# Patient Record
Sex: FEMALE | Race: WHITE | NOT HISPANIC OR LATINO | Employment: OTHER | URBAN - METROPOLITAN AREA
[De-identification: names, ages, dates, MRNs, and addresses within clinical notes are randomized per-mention and may not be internally consistent; named-entity substitution may affect disease eponyms.]

---

## 2021-02-13 DIAGNOSIS — Z23 ENCOUNTER FOR IMMUNIZATION: ICD-10-CM

## 2021-08-05 RX ORDER — ASCORBIC ACID 500 MG
500 TABLET ORAL
COMMUNITY

## 2021-08-05 RX ORDER — LANOLIN ALCOHOL/MO/W.PET/CERES
2 CREAM (GRAM) TOPICAL
COMMUNITY

## 2021-08-05 RX ORDER — METHYLDOPA/HYDROCHLOROTHIAZIDE 250MG-25MG
TABLET ORAL
COMMUNITY

## 2021-08-05 RX ORDER — OMEGA-3S/DHA/EPA/FISH OIL/D3 300MG-1000
400 CAPSULE ORAL
COMMUNITY

## 2021-08-06 ENCOUNTER — OFFICE VISIT (OUTPATIENT)
Dept: FAMILY MEDICINE CLINIC | Facility: CLINIC | Age: 66
End: 2021-08-06
Payer: COMMERCIAL

## 2021-08-06 VITALS
HEART RATE: 83 BPM | DIASTOLIC BLOOD PRESSURE: 82 MMHG | OXYGEN SATURATION: 98 % | RESPIRATION RATE: 16 BRPM | SYSTOLIC BLOOD PRESSURE: 136 MMHG | HEIGHT: 60 IN | WEIGHT: 148.4 LBS | BODY MASS INDEX: 29.13 KG/M2

## 2021-08-06 DIAGNOSIS — M25.511 CHRONIC RIGHT SHOULDER PAIN: ICD-10-CM

## 2021-08-06 DIAGNOSIS — L30.9 ECZEMA, UNSPECIFIED TYPE: ICD-10-CM

## 2021-08-06 DIAGNOSIS — M15.9 PRIMARY OSTEOARTHRITIS INVOLVING MULTIPLE JOINTS: ICD-10-CM

## 2021-08-06 DIAGNOSIS — I10 ESSENTIAL HYPERTENSION: Primary | ICD-10-CM

## 2021-08-06 DIAGNOSIS — E78.2 MIXED HYPERLIPIDEMIA: ICD-10-CM

## 2021-08-06 DIAGNOSIS — G89.29 CHRONIC RIGHT SHOULDER PAIN: ICD-10-CM

## 2021-08-06 DIAGNOSIS — Z79.1 NSAID LONG-TERM USE: ICD-10-CM

## 2021-08-06 DIAGNOSIS — Z12.31 VISIT FOR SCREENING MAMMOGRAM: ICD-10-CM

## 2021-08-06 PROBLEM — M15.0 PRIMARY OSTEOARTHRITIS INVOLVING MULTIPLE JOINTS: Status: ACTIVE | Noted: 2021-08-06

## 2021-08-06 PROCEDURE — 1036F TOBACCO NON-USER: CPT | Performed by: FAMILY MEDICINE

## 2021-08-06 PROCEDURE — 3288F FALL RISK ASSESSMENT DOCD: CPT | Performed by: FAMILY MEDICINE

## 2021-08-06 PROCEDURE — 1160F RVW MEDS BY RX/DR IN RCRD: CPT | Performed by: FAMILY MEDICINE

## 2021-08-06 PROCEDURE — 3008F BODY MASS INDEX DOCD: CPT | Performed by: FAMILY MEDICINE

## 2021-08-06 PROCEDURE — 1101F PT FALLS ASSESS-DOCD LE1/YR: CPT | Performed by: FAMILY MEDICINE

## 2021-08-06 PROCEDURE — 99204 OFFICE O/P NEW MOD 45 MIN: CPT | Performed by: FAMILY MEDICINE

## 2021-08-06 PROCEDURE — 3725F SCREEN DEPRESSION PERFORMED: CPT | Performed by: FAMILY MEDICINE

## 2021-08-06 RX ORDER — VALSARTAN 160 MG/1
160 TABLET ORAL DAILY
COMMUNITY
Start: 2021-07-23 | End: 2021-08-06 | Stop reason: SDUPTHER

## 2021-08-06 RX ORDER — CLOBETASOL PROPIONATE 0.5 MG/G
OINTMENT TOPICAL 2 TIMES DAILY
Qty: 45 G | Refills: 0 | Status: SHIPPED | OUTPATIENT
Start: 2021-08-06

## 2021-08-06 RX ORDER — MELOXICAM 15 MG/1
15 TABLET ORAL DAILY
COMMUNITY
Start: 2021-07-01 | End: 2022-01-14 | Stop reason: SDUPTHER

## 2021-08-06 RX ORDER — VALSARTAN 160 MG/1
160 TABLET ORAL DAILY
Qty: 90 TABLET | Refills: 0 | Status: SHIPPED | OUTPATIENT
Start: 2021-08-06 | End: 2021-10-19 | Stop reason: SDUPTHER

## 2021-08-06 RX ORDER — ATORVASTATIN CALCIUM 10 MG/1
10 TABLET, FILM COATED ORAL DAILY
Qty: 90 TABLET | Refills: 0 | Status: SHIPPED | OUTPATIENT
Start: 2021-08-06 | End: 2021-10-19 | Stop reason: SDUPTHER

## 2021-08-06 RX ORDER — HYDROCHLOROTHIAZIDE 25 MG/1
25 TABLET ORAL DAILY
COMMUNITY
Start: 2021-07-01 | End: 2021-10-19 | Stop reason: SDUPTHER

## 2021-08-06 RX ORDER — ATORVASTATIN CALCIUM 10 MG/1
10 TABLET, FILM COATED ORAL DAILY
COMMUNITY
Start: 2021-07-14 | End: 2021-08-06 | Stop reason: SDUPTHER

## 2021-08-06 NOTE — ASSESSMENT & PLAN NOTE
Patient's blood pressure is at goal   Continue hydrochlorothiazide 25 milligram, valsartan 160 milligram

## 2021-08-06 NOTE — PROGRESS NOTES
Subjective:      Patient ID: Maurice Mckeon is a 74 y o  female  HPI  Patient is here to establish care , moved to area  Patient has history of hypertension  Blood pressure is at goal   She is currently on hydrochlorothiazide 25, valsartan 160 milligram   Takes atorvastatin 10 mg for hyperlipidemia  Due for metabolic labs  Patient is also reporting joint pain in multiple joints  Recently has worsening right shoulder pain  Has been on meloxicam for few years now  Patient takes the medication once daily, usually you it helps with her joint pain  Is reporting skin changes on tips of left thumb, index finger  She has been using OTC hydrocortisone, which helps mildly  Denies exposure to any new allergens  Past Medical History:   Diagnosis Date    Allergic     Hive reaction to Macrodantin,  - reaction to shrimp    Arthritis ? take Meloxicam, Glucosamine & Chondroitin    Hypertension ? take hydrochlorothiazide, Valsartan & Atorvastatin    Urinary tract infection 1975    Occasional bladder infections throughout adulthood       Family History   Problem Relation Age of Onset    Depression Mother         Bi-polar    Cancer Mother         Non-small cell lung cancer (non-smoker)    Bipolar disorder Mother     Dementia Maternal Grandmother     Dementia Maternal Uncle     Hypertension Father     Heart disease Father     Hearing loss Father     Hypertension Brother     Heart disease Maternal Grandfather     Diabetes Maternal Grandfather     Completed Suicide  Maternal Uncle        Past Surgical History:   Procedure Laterality Date     SECTION  1980    EYE SURGERY  2020    cataract surgery R eye    HYSTERECTOMY  2000        reports that she has never smoked  She has never used smokeless tobacco  She reports current alcohol use of about 1 0 standard drinks of alcohol per week  She reports that she does not use drugs        Current Outpatient Medications:    ascorbic acid (VITAMIN C) 500 MG tablet, Take 500 mg by mouth, Disp: , Rfl:     atorvastatin (LIPITOR) 10 mg tablet, Take 1 tablet (10 mg total) by mouth daily, Disp: 90 tablet, Rfl: 0    cholecalciferol (VITAMIN D3) 400 units tablet, Take 400 Units by mouth, Disp: , Rfl:     Echinacea 125 MG CAPS, Take by mouth, Disp: , Rfl:     glucosamine-chondroitin 500-400 MG tablet, Take 2 tablets by mouth , Disp: , Rfl:     hydrochlorothiazide (HYDRODIURIL) 25 mg tablet, Take 25 mg by mouth daily , Disp: , Rfl:     meloxicam (MOBIC) 15 mg tablet, Take 15 mg by mouth daily , Disp: , Rfl:     Multiple Vitamins-Minerals (MULTIVITAMIN ADULT EXTRA C PO), Take 1 tablet by mouth, Disp: , Rfl:     valsartan (DIOVAN) 160 mg tablet, Take 1 tablet (160 mg total) by mouth daily, Disp: 90 tablet, Rfl: 0    The following portions of the patient's history were reviewed and updated as appropriate: allergies, current medications, past family history, past medical history, past social history, past surgical history and problem list     Review of Systems   Constitutional: Negative  Eyes: Negative  Respiratory: Negative  Negative for shortness of breath  Cardiovascular: Negative  Negative for chest pain and palpitations  Gastrointestinal: Negative  Endocrine: Negative  Genitourinary: Negative  Musculoskeletal: Negative  Negative for myalgias  Neurological: Negative  Negative for headaches  Psychiatric/Behavioral: Negative  Objective:    /82   Pulse 83   Resp 16   Ht 4' 11 65" (1 515 m)   Wt 67 3 kg (148 lb 6 4 oz)   SpO2 98%   BMI 29 33 kg/m²      Physical Exam  Constitutional:       Appearance: She is well-developed  Eyes:      Pupils: Pupils are equal, round, and reactive to light  Cardiovascular:      Rate and Rhythm: Normal rate and regular rhythm  Heart sounds: Normal heart sounds     Pulmonary:      Effort: Pulmonary effort is normal       Breath sounds: Normal breath sounds  Abdominal:      General: Bowel sounds are normal       Palpations: Abdomen is soft  Musculoskeletal:         General: Normal range of motion  Cervical back: Normal range of motion  Neurological:      Mental Status: She is alert and oriented to person, place, and time  Psychiatric:         Behavior: Behavior normal          Judgment: Judgment normal            No results found for this or any previous visit (from the past 1008 hour(s))  Assessment/Plan:    No problem-specific Assessment & Plan notes found for this encounter  Problem List Items Addressed This Visit        Cardiovascular and Mediastinum    Essential hypertension - Primary     Patient's blood pressure is at goal   Continue hydrochlorothiazide 25 milligram, valsartan 160 milligram         Relevant Medications    hydrochlorothiazide (HYDRODIURIL) 25 mg tablet    valsartan (DIOVAN) 160 mg tablet    Other Relevant Orders    Comprehensive metabolic panel    Microalbumin / creatinine urine ratio       Musculoskeletal and Integument    Primary osteoarthritis involving multiple joints    Eczema     Trial of topical steroid  Fup if symptoms persist or worsen         Relevant Medications    clobetasol (TEMOVATE) 0 05 % ointment       Other    Mixed hyperlipidemia     Continue atorvastatin 10 milligram   Suggested metabolic labs/ fup   Relevant Medications    atorvastatin (LIPITOR) 10 mg tablet    Other Relevant Orders    Comprehensive metabolic panel    Lipid panel    Visit for screening mammogram    Relevant Orders    Mammo screening bilateral w 3d & cad    Chronic right shoulder pain     Currently on meloxicam  Will fup with Orthopedics  BMI Counseling: Body mass index is 29 33 kg/m²  The BMI is above normal  Nutrition recommendations include reducing portion sizes, decreasing overall calorie intake, 3-5 servings of fruits/vegetables daily, reducing fast food intake and consuming healthier snacks  Exercise recommendations include strength training exercises  I have spent 45 minutes with Patient  today in which greater than 50% of this time was spent in counseling/coordination of care regarding Diagnostic results, Prognosis, Risks and benefits of tx options, Intructions for management, Patient and family education and Importance of tx compliance

## 2021-08-09 ENCOUNTER — TELEPHONE (OUTPATIENT)
Dept: OBGYN CLINIC | Facility: OTHER | Age: 66
End: 2021-08-09

## 2021-08-09 NOTE — TELEPHONE ENCOUNTER
Patient is being referred to Dr Amanda Waldrop for right shoulder pain  LMOM to call back and schedule appointment

## 2021-08-17 ENCOUNTER — APPOINTMENT (OUTPATIENT)
Dept: LAB | Facility: CLINIC | Age: 66
End: 2021-08-17
Payer: COMMERCIAL

## 2021-08-17 DIAGNOSIS — I10 ESSENTIAL HYPERTENSION: ICD-10-CM

## 2021-08-17 DIAGNOSIS — E78.2 MIXED HYPERLIPIDEMIA: ICD-10-CM

## 2021-08-17 LAB
ALBUMIN SERPL BCP-MCNC: 3.6 G/DL (ref 3.5–5)
ALP SERPL-CCNC: 70 U/L (ref 46–116)
ALT SERPL W P-5'-P-CCNC: 27 U/L (ref 12–78)
ANION GAP SERPL CALCULATED.3IONS-SCNC: 4 MMOL/L (ref 4–13)
AST SERPL W P-5'-P-CCNC: 20 U/L (ref 5–45)
BILIRUB SERPL-MCNC: 0.54 MG/DL (ref 0.2–1)
BUN SERPL-MCNC: 23 MG/DL (ref 5–25)
CALCIUM SERPL-MCNC: 9.2 MG/DL (ref 8.3–10.1)
CHLORIDE SERPL-SCNC: 102 MMOL/L (ref 100–108)
CHOLEST SERPL-MCNC: 213 MG/DL (ref 50–200)
CO2 SERPL-SCNC: 28 MMOL/L (ref 21–32)
CREAT SERPL-MCNC: 0.64 MG/DL (ref 0.6–1.3)
CREAT UR-MCNC: <13 MG/DL
GFR SERPL CREATININE-BSD FRML MDRD: 92 ML/MIN/1.73SQ M
GLUCOSE P FAST SERPL-MCNC: 87 MG/DL (ref 65–99)
HDLC SERPL-MCNC: 64 MG/DL
LDLC SERPL CALC-MCNC: 126 MG/DL (ref 0–100)
MICROALBUMIN UR-MCNC: <5 MG/L (ref 0–20)
NONHDLC SERPL-MCNC: 149 MG/DL
POTASSIUM SERPL-SCNC: 3.9 MMOL/L (ref 3.5–5.3)
PROT SERPL-MCNC: 7.5 G/DL (ref 6.4–8.2)
SODIUM SERPL-SCNC: 134 MMOL/L (ref 136–145)
TRIGL SERPL-MCNC: 117 MG/DL

## 2021-08-17 PROCEDURE — 82570 ASSAY OF URINE CREATININE: CPT | Performed by: FAMILY MEDICINE

## 2021-08-17 PROCEDURE — 82043 UR ALBUMIN QUANTITATIVE: CPT | Performed by: FAMILY MEDICINE

## 2021-08-17 PROCEDURE — 80053 COMPREHEN METABOLIC PANEL: CPT

## 2021-08-17 PROCEDURE — 80061 LIPID PANEL: CPT

## 2021-08-17 PROCEDURE — 36415 COLL VENOUS BLD VENIPUNCTURE: CPT

## 2021-09-02 ENCOUNTER — HOSPITAL ENCOUNTER (OUTPATIENT)
Dept: RADIOLOGY | Age: 66
Discharge: HOME | End: 2021-09-02
Payer: COMMERCIAL

## 2021-09-02 VITALS — BODY MASS INDEX: 29.84 KG/M2 | HEIGHT: 59 IN | WEIGHT: 148 LBS

## 2021-09-02 DIAGNOSIS — Z12.31 VISIT FOR SCREENING MAMMOGRAM: ICD-10-CM

## 2021-09-02 PROCEDURE — 77067 SCR MAMMO BI INCL CAD: CPT

## 2021-09-02 PROCEDURE — 77063 BREAST TOMOSYNTHESIS BI: CPT

## 2021-09-09 ENCOUNTER — APPOINTMENT (OUTPATIENT)
Dept: RADIOLOGY | Facility: AMBULARY SURGERY CENTER | Age: 66
End: 2021-09-09
Attending: ORTHOPAEDIC SURGERY
Payer: COMMERCIAL

## 2021-09-09 ENCOUNTER — OFFICE VISIT (OUTPATIENT)
Dept: OBGYN CLINIC | Facility: CLINIC | Age: 66
End: 2021-09-09
Payer: COMMERCIAL

## 2021-09-09 VITALS
SYSTOLIC BLOOD PRESSURE: 163 MMHG | WEIGHT: 148.6 LBS | HEIGHT: 60 IN | HEART RATE: 69 BPM | DIASTOLIC BLOOD PRESSURE: 92 MMHG | BODY MASS INDEX: 29.17 KG/M2

## 2021-09-09 DIAGNOSIS — M25.511 RIGHT SHOULDER PAIN, UNSPECIFIED CHRONICITY: Primary | ICD-10-CM

## 2021-09-09 DIAGNOSIS — M75.21 BICEPS TENDINITIS OF RIGHT SHOULDER: ICD-10-CM

## 2021-09-09 DIAGNOSIS — M25.511 RIGHT SHOULDER PAIN, UNSPECIFIED CHRONICITY: ICD-10-CM

## 2021-09-09 PROCEDURE — 73030 X-RAY EXAM OF SHOULDER: CPT

## 2021-09-09 PROCEDURE — 3008F BODY MASS INDEX DOCD: CPT | Performed by: ORTHOPAEDIC SURGERY

## 2021-09-09 PROCEDURE — 1036F TOBACCO NON-USER: CPT | Performed by: ORTHOPAEDIC SURGERY

## 2021-09-09 PROCEDURE — 1160F RVW MEDS BY RX/DR IN RCRD: CPT | Performed by: ORTHOPAEDIC SURGERY

## 2021-09-09 PROCEDURE — 99203 OFFICE O/P NEW LOW 30 MIN: CPT | Performed by: ORTHOPAEDIC SURGERY

## 2021-09-09 RX ORDER — LIDOCAINE 50 MG/G
OINTMENT TOPICAL AS NEEDED
Qty: 35.44 G | Refills: 0 | Status: SHIPPED | OUTPATIENT
Start: 2021-09-09 | End: 2021-10-19

## 2021-09-09 NOTE — PROGRESS NOTES
Assessment:       1  Right shoulder pain, unspecified chronicity    2  Biceps tendinitis of right shoulder          Plan:         I explained my current clinical findings and reviewed radiological findings with Karthik Christianson today  At this time I will refer her for a course of physical therapy to help improve the right shoulder range of motion  She may have some intermittent anterior subluxation but her current symptoms are more consistent with glenohumeral arthritis and long head of the biceps tendinitis  I will also prescribe her topical lidocaine ointment for symptomatic relief as needed  Will see her back in about 2 months time for clinical re-evaluation  If symptoms do persist we could consider doing a right glenohumeral cortisone and/or long head of the biceps tendon sheath cortisone injection  Subjective:     Patient ID: Alfonso Cruz is a 74 y o  female  Chief Complaint:  Right shoulder pain    HPI  Karthik Christianson   Is a 55-year-old  Right-hand-dominant lady presents today for evaluation of right shoulder pain  patient reports sustaining a possible right shoulder dislocation when she was [age]years of age for which she did not undergo any surgical intervention  Subsequently she has noticed infrequent and intermittent popping sensation of the right shoulder for the last several decades  She had not had these symptoms for several years until more recently about 1 5 months ago when she felt a popping type sensation of the right shoulder while reaching out from the right upper extremity  Subsequently, she had repeat sensation of similar nature about 2 days subsequent to the initial episode  Describes this as a sharp " twinge" associated with a popping sensation  She denies any pain at rest   Does have difficulty and some sense of apprehension when she tries to do overhead activity or right upper extremity extension  Pain intensity is only mild and intermittent  Denies any associated neck pain  Denies any history of recent trauma to the right shoulder  Social History     Occupational History    Not on file   Tobacco Use    Smoking status: Never Smoker    Smokeless tobacco: Never Used   Substance and Sexual Activity    Alcohol use: Yes     Alcohol/week: 1 0 standard drinks     Types: 1 Glasses of wine per week     Comment: Very limited  Less than 1 per week   Drug use: Never    Sexual activity: Not Currently     Partners: Male     Birth control/protection: Post-menopausal, None      Review of Systems   Constitutional: Negative  HENT: Negative  Eyes: Negative  Respiratory: Negative  Cardiovascular: Negative  Gastrointestinal: Negative  Endocrine: Negative  Genitourinary: Negative  Skin: Negative  Allergic/Immunologic: Negative  Neurological: Negative  Hematological: Negative  Psychiatric/Behavioral: Negative  Objective:     Left Shoulder Exam     Tenderness   Left shoulder tenderness location: Tender to palpation over the long head of biceps tendo  Range of Motion   Active abduction: 100   Passive abduction: 110   Left shoulder external rotation: External rotation in adduction is 70° and in abduction is 70°   Forward flexion: 150   Internal rotation 0 degrees: Lumbar   Internal rotation 90 degrees:  40 abnormal     Muscle Strength   Abduction: 5/5   Internal rotation: 5/5   External rotation: 5/5   Supraspinatus: 5/5   Subscapularis: 5/5   Biceps: 4/5     Tests   Villasenor test: negative  Cross arm: negative  Impingement: negative  Drop arm: negative    Other   Erythema: absent  Sensation: normal  Pulse: present     Comments:    Positive Union's  Positive speed's  Negative Yergason's  Has some mild sense of apprehension anteriorly  Negative acromioclavicular Jobes compression test         Physical Exam  Vitals and nursing note reviewed  Constitutional:       Appearance: She is well-developed  HENT:      Head: Normocephalic and atraumatic  Eyes:      Conjunctiva/sclera: Conjunctivae normal       Pupils: Pupils are equal, round, and reactive to light  Cardiovascular:      Rate and Rhythm: Normal rate and regular rhythm  Pulmonary:      Effort: Pulmonary effort is normal  No respiratory distress  Skin:     General: Skin is warm and dry  Findings: No erythema  Neurological:      Mental Status: She is alert and oriented to person, place, and time  Cranial Nerves: No cranial nerve deficit  Psychiatric:         Behavior: Behavior normal          Thought Content: Thought content normal          Judgment: Judgment normal            I have personally reviewed pertinent films in PACS and my interpretation is Plain radiograph of the right shoulder done today does not reveal any acute osseous injury  Mild glenohumeral arthritic changes noted  There is also some flattening of the superolateral humeral head indicative of potential old trauma  Marga Sinks

## 2021-09-16 ENCOUNTER — EVALUATION (OUTPATIENT)
Dept: PHYSICAL THERAPY | Facility: CLINIC | Age: 66
End: 2021-09-16
Payer: COMMERCIAL

## 2021-09-16 DIAGNOSIS — M75.21 BICEPS TENDINITIS OF RIGHT SHOULDER: ICD-10-CM

## 2021-09-16 DIAGNOSIS — M25.511 RIGHT SHOULDER PAIN, UNSPECIFIED CHRONICITY: ICD-10-CM

## 2021-09-16 PROCEDURE — 97530 THERAPEUTIC ACTIVITIES: CPT | Performed by: PHYSICAL THERAPIST

## 2021-09-16 PROCEDURE — 97162 PT EVAL MOD COMPLEX 30 MIN: CPT | Performed by: PHYSICAL THERAPIST

## 2021-09-16 NOTE — PROGRESS NOTES
PT Evaluation     Today's date: 2021  Patient name: Fritz Morin  : 1955  MRN: [id number]  Referring provider: Monica Waggoner MD  Dx:   Encounter Diagnosis     ICD-10-CM    1  Right shoulder pain, unspecified chronicity  M25 511 Ambulatory referral to Physical Therapy   2  Biceps tendinitis of right shoulder  M75 21 Ambulatory referral to Physical Therapy                  Assessment  Assessment details: Fritz Morin is a 74 y o  female presents with signs and symptoms consistent with:   Right shoulder pain, unspecified chronicity  Biceps tendinitis of right shoulder    Haresh Pastrana has the above listed impairments and will benefit from skilled PT to address deficits to return to prior level of function  Etiologic factors include Long history of shoulder dislocations  Prognosis is good given HEP compliance and attendance to physical therapy 2x a week  Positive prognostic indicators include motivation to return to PLOF, and strengthen shoulder  Negative prognostic indicators include history of dislocations/subluxations  Please contact me if you have any questions or recommendations  Thank you for the referral and the opportunity to share in [facility] care  Patient verbalized understanding of POC, HEP, and return demonstrated HEP  Impairments: abnormal coordination, abnormal muscle firing, abnormal muscle tone, abnormal or restricted ROM, abnormal movement, impaired physical strength, lacks appropriate home exercise program, pain with function, scapular dyskinesis and weight-bearing intolerance  Barriers to therapy: [id number] Societal implications    Understanding of Dx/Px/POC: good   Prognosis: good    Goals  Impairment Goals 4-6 weeks  - Decrease pain to 3/10  - Improve shoulder AROM to equal to the unaffected upper extremity  - Increase shoulder strength to 4+/5 throughout  - Increase scapular strength to 4/5 throughout    Functional Goals 6-8 weeks  - Return to Prior Level of Function  - Increase Functional Status Measure (FOTO) to: 61  - Patient will be independent with HEP  - Patient will be able to reach overhead without increased pain/compensation/difficulty  - Patient will be able to reach behind back without increased pain/compensation/difficulty   - Patient will be able to walk with minimal discomfort          Plan  Patient would benefit from: skilled PT  Referral necessary: No  Planned modality interventions: cryotherapy, electrical stimulation/Russian stimulation, H-Wave, TENS, thermotherapy: hydrocollator packs and unattended electrical stimulation  Planned therapy interventions: abdominal trunk stabilization, activity modification, ADL retraining, balance/weight bearing training, breathing training, body mechanics training, coordination, flexibility, functional ROM exercises, graded exercise, home exercise program, work reintegration, therapeutic training, therapeutic exercise, therapeutic activities, stretching, strengthening, self care, postural training, patient education, neuromuscular re-education, muscle pump exercises, motor coordination training, Gutiérrez taping, manual therapy, joint mobilization and IADL retraining  Frequency: 2x week  Duration in visits: 16  Duration in weeks: 8  Treatment plan discussed with: patient, PTA and referring physician        Subjective Evaluation    Pain  Current pain ratin  At best pain ratin  At worst pain ratin  Location: right shoulder pain    Patient Goals  Patient goal: she wants to strengthen the shoulder to help keep it in place  WORK: Patient is a retired   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  HOME LIFE: Patient reports that she lives with her   They help care for her MIL and DREW who live a few mins down the road  Patient reports that she does all the cooking, lighter part of the cleaning- does the heavy cleaning  She does the laundry, making the bed  Loading the   Just moved into a new home- unpacking a lot of boxes  HOBBIES/EXERCISE: She used to do the silver sneakers at the [facility], when that stopped, she started walking  She reports that she is looking to add some strengthening for her upper extremities  PAIN LOCATION/DESCRIPTORS:  She notes that the right shoulder will feel like its starting to slip, but sometimes will feel like it is "coming out"  Painful when she moves  Pain is intermittent, varies, deep  Feels like she needs to put it back into position  AGGRAVATING FACTORS:  Walking for a long time with arms down to her side  Wiping counter in a circular motion  Putting something on a higher shelf  She reports that she is fearful and scared to mess it up again  Sometimes something as simple as pulling pants up, is painful  Reaching out to the side to grasp something  After she has an incident where it feels like she is going to slip out, it will be sore for a while  EASES: Putting it back into place  Keeping the arm close to her body  Resting it  Hasn't really tried anything else  LATENCY:  Almost immediately after she "puts it back in"  Will be sore, but the pain is abolished  DAY PATTERN:  No day pattern  IMAGING:  X-ray: some arthritis  PLOF:  Patient reports that this shoulder has bothered her for about 50 years  HISTORY OF CURRENT INJURY:  Patient reports that 50 years ago, she was water skiing, and the shoulder dislocated  She reports over the years, she shoulder seemed to "slip out"  She could get it back in by raising it into flexion  She reports that when it slipped out in the past, it would go away within a few days  She reports that a few months ago, in June, she was walking outside at night, a bug got on her neck, and she shook her body, and her shoulder popped out  She reports that it hadn't happened in about 10 years, so she was a little taken aback    She reports that two nights after she reached across her body to turn the light off, and it came out again  She reports that the second incident took longer to get her shoulder back in       Objective     Postural Observations  Seated posture: fair  Standing posture: fair    Additional Postural Observation Details  Patient has forward flexed posture  Rounded scapula  Tenderness     Right Shoulder  Tenderness in the biceps tendon (proximal) and bicipital groove  Neurological Testing     Additional Neurological Details  Not necessary to test       Active Range of Motion   Left Shoulder   Flexion: 140 degrees   Extension: 65 degrees   Abduction: 115 degrees   External rotation BTH: T4   Internal rotation BTB: T7     Right Shoulder   Flexion: 132 degrees with pain  Extension: 58 degrees   Abduction: 110 degrees with pain  External rotation BTH: T4 with pain  Internal rotation BTB: T8 with pain    Strength/Myotome Testing     Right Shoulder     Planes of Motion   Flexion: 4- (pain)   Abduction: 4- (pain)   External rotation at 0°: 4- (pain)   Internal rotation at 0°: 4     Tests     Right Shoulder   Positive anterior load and shift, anterior slide, apprehension, empty can, full can, Hawkin's, posterior load and shift, sulcus sign, relocation, anterior slide and bicep load   Negative drop arm, external rotation lag sign, lift-off and painful arc                 Diagnosis: Right shoulder instability   Precautions: history of shoulder dislocations/subluxations   Goals: improve shoulder/scapular strength   Manual Therapy 9/16/21       RS flexion                                        Exercise Diary         Therapeutic Exercise        Wall slides Flexion: 10x +abd: 10x      Sleeper stretch        Cane ER stretch        Supine progressed shoulder flexion                                Neuromuscular Re-education        scap squeeze        No monies        Prone scap squeeze        TB rows/ext        TB IR/ER Therapeutic Activities        Pt education HEP, POC, all questions/concerns addressed                  Modalities

## 2021-09-23 ENCOUNTER — OFFICE VISIT (OUTPATIENT)
Dept: PHYSICAL THERAPY | Facility: CLINIC | Age: 66
End: 2021-09-23
Payer: COMMERCIAL

## 2021-09-23 DIAGNOSIS — M25.511 RIGHT SHOULDER PAIN, UNSPECIFIED CHRONICITY: Primary | ICD-10-CM

## 2021-09-23 DIAGNOSIS — M75.21 BICEPS TENDINITIS OF RIGHT SHOULDER: ICD-10-CM

## 2021-09-23 PROCEDURE — 97112 NEUROMUSCULAR REEDUCATION: CPT

## 2021-09-23 PROCEDURE — 97530 THERAPEUTIC ACTIVITIES: CPT

## 2021-09-23 PROCEDURE — 97110 THERAPEUTIC EXERCISES: CPT

## 2021-09-23 NOTE — PROGRESS NOTES
Daily Note     Today's date: 2021  Patient name: Meseret Reyna  : 1955  MRN: [id number]  Referring provider: Glenn Ybarra MD  Dx:   Encounter Diagnosis     ICD-10-CM    1  Right shoulder pain, unspecified chronicity  M25 511    2  Biceps tendinitis of right shoulder  M75 21                   Subjective: Pt states that today her shoulder feels ok, but with certain movements she feels it more  Objective: See treatment diary below      Assessment: Tolerated treatment well  Pt tentative with stretching but able to perform gentle stretch in small range with no issues  Muscle fatigue and weakness noted  Pt reports slight "pulling feeling" in bicep with ER but no pain or lingering sx's after  Discussed HEP with printout given  Pt reports understanding  She will benefit from continued therapy  Plan: Continue per plan of care     Diagnosis: Right shoulder instability   Precautions: history of shoulder dislocations/subluxations   Goals: improve shoulder/scapular strength   Manual Therapy 21      RS flexion  TJH, PTA                                      Exercise Diary         Therapeutic Exercise        Wall slides Flexion: 10x +abd: 10x ea      Sleeper stretch  3x30 sec      Cane ER stretch  10x10 sec      Supine progressed shoulder flexion  10x                              Neuromuscular Re-education        scap squeeze  15x      No monies  YTB: 15x      Prone scap squeeze  NV      TB rows/ext  RTB: 15x ea      TB IR/ER  RTB: 10x ea                              Therapeutic Activities        Pt education HEP, POC, all questions/concerns addressed    HEP reviewed with printout given              Modalities        ice  10 mins

## 2021-09-28 ENCOUNTER — OFFICE VISIT (OUTPATIENT)
Dept: PHYSICAL THERAPY | Facility: CLINIC | Age: 66
End: 2021-09-28
Payer: COMMERCIAL

## 2021-09-28 DIAGNOSIS — M75.21 BICEPS TENDINITIS OF RIGHT SHOULDER: ICD-10-CM

## 2021-09-28 DIAGNOSIS — M25.511 RIGHT SHOULDER PAIN, UNSPECIFIED CHRONICITY: Primary | ICD-10-CM

## 2021-09-28 PROCEDURE — 97110 THERAPEUTIC EXERCISES: CPT

## 2021-09-28 PROCEDURE — 97112 NEUROMUSCULAR REEDUCATION: CPT

## 2021-09-28 PROCEDURE — 97530 THERAPEUTIC ACTIVITIES: CPT

## 2021-09-28 NOTE — PROGRESS NOTES
Daily Note     Today's date: 2021  Patient name: Donna Reid  : 1955  MRN: [id number]  Referring provider: Soni Diggs MD  Dx:   Encounter Diagnosis     ICD-10-CM    1  Right shoulder pain, unspecified chronicity  M25 511    2  Biceps tendinitis of right shoulder  M75 21                   Subjective: Pt reports that her shoulder has felt ok, but she did just reach over to the passenger side carseat and felt her shoulder almost start to slip out  Objective: See treatment diary below      Assessment: Tolerated treatment well  Progressed pt with shoulder and scapular strengthening as able with no c/o increased pain  Slight discomfort is noted with tband ER  HEP reviewed and new exercises given with printout  Pt reports understanding  Will continue to progress as able  Plan: Continue per plan of care     Diagnosis: Right shoulder instability   Precautions: history of shoulder dislocations/subluxations   Goals: improve shoulder/scapular strength   Manual Therapy 21     RS flexion  TJH, PTA TJH, PTA                                     Exercise Diary         Therapeutic Exercise        Wall slides Flexion: 10x +abd: 10x ea 15x ea     Sleeper stretch  3x30 sec      Cane ER stretch  10x10 sec 10x10 sec     Supine progressed shoulder flexion  10x 15x                             Neuromuscular Re-education        scap squeeze  15x 20x     No monies  YTB: 15x YTB: 20x     Prone scap squeeze  NV 15x with extensive cues to avoid UT compensation     TB rows/ext  RTB: 15x ea RTB: 20x ea     TB IR/ER  RTB: 10x ea RTB: 15x     Alt flex/scap/abd   10x     Serratus punch   1# 15x             Therapeutic Activities        Pt education HEP, POC, all questions/concerns addressed    HEP reviewed with printout given HEP reviewed with printout given             Modalities        ice  10 mins

## 2021-09-30 ENCOUNTER — OFFICE VISIT (OUTPATIENT)
Dept: PHYSICAL THERAPY | Facility: CLINIC | Age: 66
End: 2021-09-30
Payer: COMMERCIAL

## 2021-09-30 DIAGNOSIS — M75.21 BICEPS TENDINITIS OF RIGHT SHOULDER: ICD-10-CM

## 2021-09-30 DIAGNOSIS — M25.511 RIGHT SHOULDER PAIN, UNSPECIFIED CHRONICITY: Primary | ICD-10-CM

## 2021-09-30 PROCEDURE — 97530 THERAPEUTIC ACTIVITIES: CPT

## 2021-09-30 PROCEDURE — 97110 THERAPEUTIC EXERCISES: CPT

## 2021-09-30 PROCEDURE — 97112 NEUROMUSCULAR REEDUCATION: CPT

## 2021-10-05 ENCOUNTER — OFFICE VISIT (OUTPATIENT)
Dept: PHYSICAL THERAPY | Facility: CLINIC | Age: 66
End: 2021-10-05
Payer: COMMERCIAL

## 2021-10-05 DIAGNOSIS — M75.21 BICEPS TENDINITIS OF RIGHT SHOULDER: ICD-10-CM

## 2021-10-05 DIAGNOSIS — M25.511 RIGHT SHOULDER PAIN, UNSPECIFIED CHRONICITY: Primary | ICD-10-CM

## 2021-10-05 PROCEDURE — 97112 NEUROMUSCULAR REEDUCATION: CPT | Performed by: PHYSICAL THERAPIST

## 2021-10-05 PROCEDURE — 97110 THERAPEUTIC EXERCISES: CPT | Performed by: PHYSICAL THERAPIST

## 2021-10-07 ENCOUNTER — OFFICE VISIT (OUTPATIENT)
Dept: PHYSICAL THERAPY | Facility: CLINIC | Age: 66
End: 2021-10-07
Payer: COMMERCIAL

## 2021-10-07 DIAGNOSIS — M75.21 BICEPS TENDINITIS OF RIGHT SHOULDER: ICD-10-CM

## 2021-10-07 DIAGNOSIS — M25.511 RIGHT SHOULDER PAIN, UNSPECIFIED CHRONICITY: Primary | ICD-10-CM

## 2021-10-07 PROCEDURE — 97112 NEUROMUSCULAR REEDUCATION: CPT

## 2021-10-07 PROCEDURE — 97530 THERAPEUTIC ACTIVITIES: CPT

## 2021-10-07 PROCEDURE — 97110 THERAPEUTIC EXERCISES: CPT

## 2021-10-11 ENCOUNTER — RA CDI HCC (OUTPATIENT)
Dept: OTHER | Facility: HOSPITAL | Age: 66
End: 2021-10-11

## 2021-10-12 ENCOUNTER — OFFICE VISIT (OUTPATIENT)
Dept: PHYSICAL THERAPY | Facility: CLINIC | Age: 66
End: 2021-10-12
Payer: COMMERCIAL

## 2021-10-12 DIAGNOSIS — M75.21 BICEPS TENDINITIS OF RIGHT SHOULDER: ICD-10-CM

## 2021-10-12 DIAGNOSIS — M25.511 RIGHT SHOULDER PAIN, UNSPECIFIED CHRONICITY: Primary | ICD-10-CM

## 2021-10-12 PROCEDURE — 97110 THERAPEUTIC EXERCISES: CPT

## 2021-10-12 PROCEDURE — 97112 NEUROMUSCULAR REEDUCATION: CPT

## 2021-10-14 ENCOUNTER — EVALUATION (OUTPATIENT)
Dept: PHYSICAL THERAPY | Facility: CLINIC | Age: 66
End: 2021-10-14
Payer: COMMERCIAL

## 2021-10-14 DIAGNOSIS — M25.511 RIGHT SHOULDER PAIN, UNSPECIFIED CHRONICITY: Primary | ICD-10-CM

## 2021-10-14 DIAGNOSIS — M75.21 BICEPS TENDINITIS OF RIGHT SHOULDER: ICD-10-CM

## 2021-10-14 PROCEDURE — 97140 MANUAL THERAPY 1/> REGIONS: CPT | Performed by: PHYSICAL THERAPIST

## 2021-10-14 PROCEDURE — 97530 THERAPEUTIC ACTIVITIES: CPT | Performed by: PHYSICAL THERAPIST

## 2021-10-14 PROCEDURE — 97110 THERAPEUTIC EXERCISES: CPT | Performed by: PHYSICAL THERAPIST

## 2021-10-15 ENCOUNTER — OFFICE VISIT (OUTPATIENT)
Dept: OBGYN CLINIC | Facility: CLINIC | Age: 66
End: 2021-10-15
Payer: COMMERCIAL

## 2021-10-15 VITALS
BODY MASS INDEX: 29.06 KG/M2 | WEIGHT: 148 LBS | SYSTOLIC BLOOD PRESSURE: 147 MMHG | HEIGHT: 60 IN | DIASTOLIC BLOOD PRESSURE: 85 MMHG | HEART RATE: 87 BPM

## 2021-10-15 DIAGNOSIS — M19.072 ARTHRITIS OF LEFT SUBTALAR JOINT: ICD-10-CM

## 2021-10-15 DIAGNOSIS — M76.62 ACHILLES TENDINITIS OF LEFT LOWER EXTREMITY: Primary | ICD-10-CM

## 2021-10-15 PROCEDURE — 3079F DIAST BP 80-89 MM HG: CPT | Performed by: ORTHOPAEDIC SURGERY

## 2021-10-15 PROCEDURE — 3077F SYST BP >= 140 MM HG: CPT | Performed by: ORTHOPAEDIC SURGERY

## 2021-10-15 PROCEDURE — 99213 OFFICE O/P EST LOW 20 MIN: CPT | Performed by: ORTHOPAEDIC SURGERY

## 2021-10-15 RX ORDER — METHYLPREDNISOLONE 4 MG/1
TABLET ORAL
Qty: 1 EACH | Refills: 0 | Status: SHIPPED | OUTPATIENT
Start: 2021-10-15 | End: 2022-01-14

## 2021-10-18 ENCOUNTER — TELEPHONE (OUTPATIENT)
Dept: OBGYN CLINIC | Facility: HOSPITAL | Age: 66
End: 2021-10-18

## 2021-10-19 ENCOUNTER — OFFICE VISIT (OUTPATIENT)
Dept: FAMILY MEDICINE CLINIC | Facility: CLINIC | Age: 66
End: 2021-10-19
Payer: COMMERCIAL

## 2021-10-19 ENCOUNTER — OFFICE VISIT (OUTPATIENT)
Dept: PHYSICAL THERAPY | Facility: CLINIC | Age: 66
End: 2021-10-19
Payer: COMMERCIAL

## 2021-10-19 VITALS
HEART RATE: 96 BPM | OXYGEN SATURATION: 97 % | WEIGHT: 150.6 LBS | DIASTOLIC BLOOD PRESSURE: 80 MMHG | HEIGHT: 60 IN | BODY MASS INDEX: 29.57 KG/M2 | RESPIRATION RATE: 18 BRPM | SYSTOLIC BLOOD PRESSURE: 120 MMHG

## 2021-10-19 DIAGNOSIS — M81.0 OSTEOPOROSIS, UNSPECIFIED OSTEOPOROSIS TYPE, UNSPECIFIED PATHOLOGICAL FRACTURE PRESENCE: ICD-10-CM

## 2021-10-19 DIAGNOSIS — E78.2 MIXED HYPERLIPIDEMIA: ICD-10-CM

## 2021-10-19 DIAGNOSIS — Z23 NEED FOR VACCINATION: ICD-10-CM

## 2021-10-19 DIAGNOSIS — I10 ESSENTIAL HYPERTENSION: Primary | ICD-10-CM

## 2021-10-19 DIAGNOSIS — M25.511 RIGHT SHOULDER PAIN, UNSPECIFIED CHRONICITY: Primary | ICD-10-CM

## 2021-10-19 DIAGNOSIS — M75.21 BICEPS TENDINITIS OF RIGHT SHOULDER: ICD-10-CM

## 2021-10-19 PROCEDURE — 3725F SCREEN DEPRESSION PERFORMED: CPT | Performed by: FAMILY MEDICINE

## 2021-10-19 PROCEDURE — 97530 THERAPEUTIC ACTIVITIES: CPT

## 2021-10-19 PROCEDURE — 99214 OFFICE O/P EST MOD 30 MIN: CPT | Performed by: FAMILY MEDICINE

## 2021-10-19 PROCEDURE — 1125F AMNT PAIN NOTED PAIN PRSNT: CPT | Performed by: FAMILY MEDICINE

## 2021-10-19 PROCEDURE — G0439 PPPS, SUBSEQ VISIT: HCPCS | Performed by: FAMILY MEDICINE

## 2021-10-19 PROCEDURE — 97112 NEUROMUSCULAR REEDUCATION: CPT

## 2021-10-19 PROCEDURE — 97110 THERAPEUTIC EXERCISES: CPT

## 2021-10-19 PROCEDURE — 1160F RVW MEDS BY RX/DR IN RCRD: CPT | Performed by: FAMILY MEDICINE

## 2021-10-19 PROCEDURE — 90715 TDAP VACCINE 7 YRS/> IM: CPT | Performed by: FAMILY MEDICINE

## 2021-10-19 PROCEDURE — G0008 ADMIN INFLUENZA VIRUS VAC: HCPCS | Performed by: FAMILY MEDICINE

## 2021-10-19 PROCEDURE — 1170F FXNL STATUS ASSESSED: CPT | Performed by: FAMILY MEDICINE

## 2021-10-19 PROCEDURE — 90662 IIV NO PRSV INCREASED AG IM: CPT | Performed by: FAMILY MEDICINE

## 2021-10-19 PROCEDURE — 90471 IMMUNIZATION ADMIN: CPT | Performed by: FAMILY MEDICINE

## 2021-10-19 PROCEDURE — 1036F TOBACCO NON-USER: CPT | Performed by: FAMILY MEDICINE

## 2021-10-19 PROCEDURE — 3008F BODY MASS INDEX DOCD: CPT | Performed by: FAMILY MEDICINE

## 2021-10-19 RX ORDER — VALSARTAN 160 MG/1
160 TABLET ORAL DAILY
Qty: 90 TABLET | Refills: 1 | Status: SHIPPED | OUTPATIENT
Start: 2021-10-19 | End: 2022-04-05 | Stop reason: SDUPTHER

## 2021-10-19 RX ORDER — HYDROCHLOROTHIAZIDE 25 MG/1
25 TABLET ORAL DAILY
Qty: 90 TABLET | Refills: 1 | Status: SHIPPED | OUTPATIENT
Start: 2021-10-19 | End: 2022-04-05 | Stop reason: SDUPTHER

## 2021-10-19 RX ORDER — ATORVASTATIN CALCIUM 10 MG/1
10 TABLET, FILM COATED ORAL DAILY
Qty: 90 TABLET | Refills: 1 | Status: SHIPPED | OUTPATIENT
Start: 2021-10-19 | End: 2022-04-05 | Stop reason: SDUPTHER

## 2021-10-21 ENCOUNTER — OFFICE VISIT (OUTPATIENT)
Dept: PHYSICAL THERAPY | Facility: CLINIC | Age: 66
End: 2021-10-21
Payer: COMMERCIAL

## 2021-10-21 DIAGNOSIS — M75.21 BICEPS TENDINITIS OF RIGHT SHOULDER: ICD-10-CM

## 2021-10-21 DIAGNOSIS — M25.511 RIGHT SHOULDER PAIN, UNSPECIFIED CHRONICITY: Primary | ICD-10-CM

## 2021-10-21 PROCEDURE — 97110 THERAPEUTIC EXERCISES: CPT

## 2021-10-21 PROCEDURE — 97112 NEUROMUSCULAR REEDUCATION: CPT

## 2021-10-21 PROCEDURE — 97140 MANUAL THERAPY 1/> REGIONS: CPT

## 2021-10-26 ENCOUNTER — EVALUATION (OUTPATIENT)
Dept: PHYSICAL THERAPY | Facility: CLINIC | Age: 66
End: 2021-10-26
Payer: COMMERCIAL

## 2021-10-26 DIAGNOSIS — M76.62 ACHILLES TENDINITIS OF LEFT LOWER EXTREMITY: ICD-10-CM

## 2021-10-26 PROCEDURE — 97112 NEUROMUSCULAR REEDUCATION: CPT | Performed by: PHYSICAL THERAPIST

## 2021-10-26 PROCEDURE — 97162 PT EVAL MOD COMPLEX 30 MIN: CPT | Performed by: PHYSICAL THERAPIST

## 2021-10-28 ENCOUNTER — OFFICE VISIT (OUTPATIENT)
Dept: PHYSICAL THERAPY | Facility: CLINIC | Age: 66
End: 2021-10-28
Payer: COMMERCIAL

## 2021-10-28 DIAGNOSIS — M25.511 RIGHT SHOULDER PAIN, UNSPECIFIED CHRONICITY: ICD-10-CM

## 2021-10-28 DIAGNOSIS — M76.62 ACHILLES TENDINITIS OF LEFT LOWER EXTREMITY: Primary | ICD-10-CM

## 2021-10-28 DIAGNOSIS — M75.21 BICEPS TENDINITIS OF RIGHT SHOULDER: ICD-10-CM

## 2021-10-28 PROCEDURE — 97110 THERAPEUTIC EXERCISES: CPT

## 2021-10-28 PROCEDURE — 97140 MANUAL THERAPY 1/> REGIONS: CPT

## 2021-10-28 PROCEDURE — 97112 NEUROMUSCULAR REEDUCATION: CPT

## 2021-10-28 PROCEDURE — 97530 THERAPEUTIC ACTIVITIES: CPT | Performed by: PHYSICAL THERAPIST

## 2021-11-02 ENCOUNTER — OFFICE VISIT (OUTPATIENT)
Dept: PHYSICAL THERAPY | Facility: CLINIC | Age: 66
End: 2021-11-02
Payer: COMMERCIAL

## 2021-11-02 ENCOUNTER — IMMUNIZATIONS (OUTPATIENT)
Dept: FAMILY MEDICINE CLINIC | Facility: HOSPITAL | Age: 66
End: 2021-11-02

## 2021-11-02 DIAGNOSIS — M76.62 ACHILLES TENDINITIS OF LEFT LOWER EXTREMITY: Primary | ICD-10-CM

## 2021-11-02 DIAGNOSIS — Z23 ENCOUNTER FOR IMMUNIZATION: Primary | ICD-10-CM

## 2021-11-02 DIAGNOSIS — M25.511 RIGHT SHOULDER PAIN, UNSPECIFIED CHRONICITY: ICD-10-CM

## 2021-11-02 DIAGNOSIS — M75.21 BICEPS TENDINITIS OF RIGHT SHOULDER: ICD-10-CM

## 2021-11-02 PROCEDURE — 97530 THERAPEUTIC ACTIVITIES: CPT

## 2021-11-02 PROCEDURE — 97110 THERAPEUTIC EXERCISES: CPT

## 2021-11-02 PROCEDURE — 97112 NEUROMUSCULAR REEDUCATION: CPT

## 2021-11-04 ENCOUNTER — OFFICE VISIT (OUTPATIENT)
Dept: OBGYN CLINIC | Facility: CLINIC | Age: 66
End: 2021-11-04
Payer: COMMERCIAL

## 2021-11-04 VITALS
BODY MASS INDEX: 29.64 KG/M2 | WEIGHT: 151 LBS | DIASTOLIC BLOOD PRESSURE: 86 MMHG | HEART RATE: 101 BPM | HEIGHT: 60 IN | SYSTOLIC BLOOD PRESSURE: 128 MMHG

## 2021-11-04 DIAGNOSIS — M19.011 ARTHRITIS OF RIGHT GLENOHUMERAL JOINT: ICD-10-CM

## 2021-11-04 DIAGNOSIS — M75.21 BICEPS TENDINITIS OF RIGHT SHOULDER: Primary | ICD-10-CM

## 2021-11-04 PROCEDURE — 1036F TOBACCO NON-USER: CPT | Performed by: ORTHOPAEDIC SURGERY

## 2021-11-04 PROCEDURE — 3074F SYST BP LT 130 MM HG: CPT | Performed by: ORTHOPAEDIC SURGERY

## 2021-11-04 PROCEDURE — 99213 OFFICE O/P EST LOW 20 MIN: CPT | Performed by: ORTHOPAEDIC SURGERY

## 2021-11-04 PROCEDURE — 3008F BODY MASS INDEX DOCD: CPT | Performed by: ORTHOPAEDIC SURGERY

## 2021-11-04 PROCEDURE — 1160F RVW MEDS BY RX/DR IN RCRD: CPT | Performed by: ORTHOPAEDIC SURGERY

## 2021-11-04 PROCEDURE — 3079F DIAST BP 80-89 MM HG: CPT | Performed by: ORTHOPAEDIC SURGERY

## 2021-11-08 ENCOUNTER — APPOINTMENT (OUTPATIENT)
Dept: PHYSICAL THERAPY | Facility: CLINIC | Age: 66
End: 2021-11-08
Payer: COMMERCIAL

## 2021-11-09 ENCOUNTER — TELEMEDICINE (OUTPATIENT)
Dept: PHYSICAL THERAPY | Facility: CLINIC | Age: 66
End: 2021-11-09
Payer: COMMERCIAL

## 2021-11-09 DIAGNOSIS — M75.21 BICEPS TENDINITIS OF RIGHT SHOULDER: ICD-10-CM

## 2021-11-09 DIAGNOSIS — M25.511 RIGHT SHOULDER PAIN, UNSPECIFIED CHRONICITY: ICD-10-CM

## 2021-11-09 DIAGNOSIS — M76.62 ACHILLES TENDINITIS OF LEFT LOWER EXTREMITY: Primary | ICD-10-CM

## 2021-11-09 PROCEDURE — 97530 THERAPEUTIC ACTIVITIES: CPT | Performed by: PHYSICAL THERAPIST

## 2021-11-09 PROCEDURE — 97110 THERAPEUTIC EXERCISES: CPT | Performed by: PHYSICAL THERAPIST

## 2021-11-16 ENCOUNTER — TELEMEDICINE (OUTPATIENT)
Dept: PHYSICAL THERAPY | Facility: CLINIC | Age: 66
End: 2021-11-16
Payer: COMMERCIAL

## 2021-11-16 DIAGNOSIS — M76.62 ACHILLES TENDINITIS OF LEFT LOWER EXTREMITY: Primary | ICD-10-CM

## 2021-11-16 DIAGNOSIS — M75.21 BICEPS TENDINITIS OF RIGHT SHOULDER: ICD-10-CM

## 2021-11-16 DIAGNOSIS — M25.511 RIGHT SHOULDER PAIN, UNSPECIFIED CHRONICITY: ICD-10-CM

## 2021-11-16 PROCEDURE — 97110 THERAPEUTIC EXERCISES: CPT | Performed by: PHYSICAL THERAPIST

## 2021-11-16 PROCEDURE — 97530 THERAPEUTIC ACTIVITIES: CPT | Performed by: PHYSICAL THERAPIST

## 2021-11-16 PROCEDURE — 97112 NEUROMUSCULAR REEDUCATION: CPT | Performed by: PHYSICAL THERAPIST

## 2021-11-18 ENCOUNTER — APPOINTMENT (OUTPATIENT)
Dept: PHYSICAL THERAPY | Facility: CLINIC | Age: 66
End: 2021-11-18
Payer: COMMERCIAL

## 2021-11-23 ENCOUNTER — EVALUATION (OUTPATIENT)
Dept: PHYSICAL THERAPY | Facility: CLINIC | Age: 66
End: 2021-11-23
Payer: COMMERCIAL

## 2021-11-23 DIAGNOSIS — M75.21 BICEPS TENDINITIS OF RIGHT SHOULDER: ICD-10-CM

## 2021-11-23 DIAGNOSIS — M25.511 RIGHT SHOULDER PAIN, UNSPECIFIED CHRONICITY: ICD-10-CM

## 2021-11-23 DIAGNOSIS — M76.62 ACHILLES TENDINITIS OF LEFT LOWER EXTREMITY: Primary | ICD-10-CM

## 2021-11-23 PROCEDURE — 97530 THERAPEUTIC ACTIVITIES: CPT | Performed by: PHYSICAL THERAPIST

## 2021-11-23 PROCEDURE — 97140 MANUAL THERAPY 1/> REGIONS: CPT | Performed by: PHYSICAL THERAPIST

## 2021-11-30 ENCOUNTER — OFFICE VISIT (OUTPATIENT)
Dept: PHYSICAL THERAPY | Facility: CLINIC | Age: 66
End: 2021-11-30
Payer: COMMERCIAL

## 2021-11-30 ENCOUNTER — TELEPHONE (OUTPATIENT)
Dept: ADMINISTRATIVE | Facility: OTHER | Age: 66
End: 2021-11-30

## 2021-11-30 DIAGNOSIS — M76.62 ACHILLES TENDINITIS OF LEFT LOWER EXTREMITY: Primary | ICD-10-CM

## 2021-11-30 PROCEDURE — 97112 NEUROMUSCULAR REEDUCATION: CPT

## 2021-11-30 PROCEDURE — 97110 THERAPEUTIC EXERCISES: CPT

## 2021-12-06 ENCOUNTER — OFFICE VISIT (OUTPATIENT)
Dept: PHYSICAL THERAPY | Facility: CLINIC | Age: 66
End: 2021-12-06
Payer: COMMERCIAL

## 2021-12-06 DIAGNOSIS — M76.62 ACHILLES TENDINITIS OF LEFT LOWER EXTREMITY: Primary | ICD-10-CM

## 2021-12-06 PROCEDURE — 97110 THERAPEUTIC EXERCISES: CPT

## 2021-12-06 PROCEDURE — 97112 NEUROMUSCULAR REEDUCATION: CPT

## 2021-12-09 ENCOUNTER — APPOINTMENT (OUTPATIENT)
Dept: PHYSICAL THERAPY | Facility: CLINIC | Age: 66
End: 2021-12-09
Payer: COMMERCIAL

## 2021-12-14 ENCOUNTER — OFFICE VISIT (OUTPATIENT)
Dept: PHYSICAL THERAPY | Facility: CLINIC | Age: 66
End: 2021-12-14
Payer: COMMERCIAL

## 2021-12-14 DIAGNOSIS — M76.62 ACHILLES TENDINITIS OF LEFT LOWER EXTREMITY: Primary | ICD-10-CM

## 2021-12-14 PROCEDURE — 97112 NEUROMUSCULAR REEDUCATION: CPT

## 2021-12-14 PROCEDURE — 97530 THERAPEUTIC ACTIVITIES: CPT

## 2021-12-14 PROCEDURE — 97110 THERAPEUTIC EXERCISES: CPT

## 2021-12-16 ENCOUNTER — APPOINTMENT (OUTPATIENT)
Dept: PHYSICAL THERAPY | Facility: CLINIC | Age: 66
End: 2021-12-16
Payer: COMMERCIAL

## 2021-12-20 ENCOUNTER — OFFICE VISIT (OUTPATIENT)
Dept: PHYSICAL THERAPY | Facility: CLINIC | Age: 66
End: 2021-12-20
Payer: COMMERCIAL

## 2021-12-20 DIAGNOSIS — M76.62 ACHILLES TENDINITIS OF LEFT LOWER EXTREMITY: Primary | ICD-10-CM

## 2021-12-20 PROCEDURE — 97140 MANUAL THERAPY 1/> REGIONS: CPT

## 2021-12-20 PROCEDURE — 97110 THERAPEUTIC EXERCISES: CPT

## 2021-12-20 PROCEDURE — 97112 NEUROMUSCULAR REEDUCATION: CPT

## 2021-12-21 ENCOUNTER — APPOINTMENT (OUTPATIENT)
Dept: PHYSICAL THERAPY | Facility: CLINIC | Age: 66
End: 2021-12-21
Payer: COMMERCIAL

## 2021-12-23 ENCOUNTER — APPOINTMENT (OUTPATIENT)
Dept: PHYSICAL THERAPY | Facility: CLINIC | Age: 66
End: 2021-12-23
Payer: COMMERCIAL

## 2021-12-27 ENCOUNTER — EVALUATION (OUTPATIENT)
Dept: PHYSICAL THERAPY | Facility: CLINIC | Age: 66
End: 2021-12-27
Payer: COMMERCIAL

## 2021-12-27 DIAGNOSIS — M76.62 ACHILLES TENDINITIS OF LEFT LOWER EXTREMITY: Primary | ICD-10-CM

## 2021-12-27 PROCEDURE — 97530 THERAPEUTIC ACTIVITIES: CPT | Performed by: PHYSICAL THERAPIST

## 2021-12-27 PROCEDURE — 97140 MANUAL THERAPY 1/> REGIONS: CPT | Performed by: PHYSICAL THERAPIST

## 2021-12-30 ENCOUNTER — APPOINTMENT (OUTPATIENT)
Dept: PHYSICAL THERAPY | Facility: CLINIC | Age: 66
End: 2021-12-30
Payer: COMMERCIAL

## 2022-01-14 DIAGNOSIS — M25.511 CHRONIC RIGHT SHOULDER PAIN: Primary | ICD-10-CM

## 2022-01-14 DIAGNOSIS — G89.29 CHRONIC RIGHT SHOULDER PAIN: Primary | ICD-10-CM

## 2022-01-14 RX ORDER — MELOXICAM 15 MG/1
15 TABLET ORAL DAILY
Qty: 30 TABLET | Refills: 0 | Status: SHIPPED | OUTPATIENT
Start: 2022-01-14 | End: 2022-04-05

## 2022-01-18 ENCOUNTER — OFFICE VISIT (OUTPATIENT)
Dept: OBGYN CLINIC | Facility: CLINIC | Age: 67
End: 2022-01-18
Payer: COMMERCIAL

## 2022-01-18 VITALS
HEIGHT: 60 IN | WEIGHT: 150 LBS | BODY MASS INDEX: 29.45 KG/M2 | DIASTOLIC BLOOD PRESSURE: 90 MMHG | HEART RATE: 102 BPM | SYSTOLIC BLOOD PRESSURE: 142 MMHG

## 2022-01-18 DIAGNOSIS — M19.072 ARTHRITIS OF LEFT SUBTALAR JOINT: ICD-10-CM

## 2022-01-18 DIAGNOSIS — M76.62 ACHILLES TENDINITIS OF LEFT LOWER EXTREMITY: Primary | ICD-10-CM

## 2022-01-18 PROCEDURE — 1160F RVW MEDS BY RX/DR IN RCRD: CPT | Performed by: ORTHOPAEDIC SURGERY

## 2022-01-18 PROCEDURE — 1036F TOBACCO NON-USER: CPT | Performed by: ORTHOPAEDIC SURGERY

## 2022-01-18 PROCEDURE — 3077F SYST BP >= 140 MM HG: CPT | Performed by: ORTHOPAEDIC SURGERY

## 2022-01-18 PROCEDURE — 3080F DIAST BP >= 90 MM HG: CPT | Performed by: ORTHOPAEDIC SURGERY

## 2022-01-18 PROCEDURE — 99213 OFFICE O/P EST LOW 20 MIN: CPT | Performed by: ORTHOPAEDIC SURGERY

## 2022-01-18 PROCEDURE — 3008F BODY MASS INDEX DOCD: CPT | Performed by: ORTHOPAEDIC SURGERY

## 2022-01-18 NOTE — PROGRESS NOTES
GUY Jerome  Attending, Orthopaedic Surgery  Foot and [address] [facility] [address] [facility]      ORTHOPAEDIC FOOT AND ANKLE CLINIC VISIT     Assessment:     Encounter Diagnoses   Name Primary?  Achilles tendinitis of left lower extremity Yes    Arthritis of left subtalar joint             Plan:   · The patient verbalized understanding of exam findings and treatment plan  We engaged in the shared decision-making process and treatment options were discussed at length with the patient  Surgical and conservative management discussed today along with risks and benefits  · Left insertional achilles tendonitis and asymptomatic subtalar arthritis   · Successful conservative management of pain with PT, P F  night splints, and heel wedges  · Continue with HEP and heel wedges, P F  night splints as needed   Return if symptoms worsen or fail to improve  History of Present Illness:   Chief Complaint:   Chief Complaint   Patient presents with    Left Ankle - Follow-up     Mery Hammond is a 74 y o  female who is being seen in follow-up for left insertional Achilles tendinitis and subtalar arthritis  When we last saw she we recommended physical therapy, P F  night splint, and heel wedges  Pain has improved  Residual pain is localized at anteriolateral ankle with minimal radiating and described as dull, achy and mild in severity        Pain/symptom timing:  Worse during the day when active  Pain/symptom context:  Worse with activites and work  Pain/symptom modifying factors:  Rest makes better, activities make worse  Pain/symptom associated signs/symptoms: none    Prior treatment   · NSAIDsYes   · Injections Yes and No   · Bracing/Orthotics Yes    · Physical Therapy Yes     Orthopedic Surgical History:   None    Past Medical, Surgical and Social History:  Past Medical History:  has a past medical history of Allergic (2010), Arthritis (2012?), Hypertension (2000?), and Urinary tract infection ()  Problem List: does not have any pertinent problems on file  Past Surgical History:  has a past surgical history that includes  section (1980); Eye surgery (2020); Hysterectomy (2000); Oophorectomy; and Parotidectomy ()  Family History: family history includes Bipolar disorder in her mother; Cancer in her mother; Completed Suicide  in her maternal uncle; Dementia in her maternal grandmother and maternal uncle; Depression in her mother; Diabetes in her maternal grandfather; Hearing loss in her father; Heart disease in her father and maternal grandfather; Hypertension in her brother and father; No Known Problems in her daughter, daughter, and paternal aunt  Social History:  reports that she has never smoked  She has never used smokeless tobacco  She reports current alcohol use of about 1 0 standard drink of alcohol per week  She reports that she does not use drugs  Current Medications: has a current medication list which includes the following prescription(s): ascorbic acid, atorvastatin, cholecalciferol, clobetasol, echinacea, glucosamine-chondroitin, hydrochlorothiazide, meloxicam, multiple vitamins-minerals, and valsartan  Allergies: is allergic to nitrofurantoin, shrimp extract allergy skin test - food allergy, and pneumococcal vaccine       Review of Systems:  General- denies fever/chills  HEENT- denies hearing loss or sore throat  Eyes- denies eye pain or visual disturbances, denies red eyes  Respiratory- denies cough or SOB  Cardio- denies chest pain or palpitations  GI- denies abdominal pain  Endocrine- denies urinary frequency  Urinary- denies pain with urination  Musculoskeletal- Negative except noted above  Skin- denies rashes or wounds  Neurological- denies dizziness or headache  Psychiatric- denies anxiety or difficulty concentrating    Physical Exam:   /90 (BP Location: Right arm, Patient Position: Sitting, Cuff Size: Adult)   Pulse 102   Ht 5' (1 524 m)   Wt 68 kg (150 lb)   BMI 29 29 kg/m²   General/Constitutional: No apparent distress: well-nourished and well developed  Eyes: normal ocular motion  Lymphatic: No appreciable lymphadenopathy  Respiratory: Non-labored breathing  Vascular: No edema, swelling or tenderness, except as noted in detailed exam   Integumentary: No impressive skin lesions present, except as noted in detailed exam   Neuro: No ataxia or tremors noted  Psych: Normal mood and affect, oriented to person, place and time  Appropriate affect  Musculoskeletal: Normal, except as noted in detailed exam and in HPI  Examination    Left    Gait Normal   Musculoskeletal No Tenderness to palpation    Skin Normal      Nails Normal    Range of Motion  15 degrees dorsiflexion, 40 degrees plantarflexion  Subtalar motion: normal    Stability Stable    Muscle Strength 5/5 tibialis anterior  5/5 gastrocnemius-soleus  5/5 posterior tibialis  5/5 peroneal/eversion strength  5/5 EHL  5/5 FHL    Neurologic Normal    Sensation  Intact to light touch throughout sural, saphenous, superficial peroneal, deep peroneal and medial/lateral plantar nerve distributions  Elrama-Kristina 5 07 filament (10g) testing  deferred  Cardiovascular Brisk capillary refill < 2 seconds,intact DP and PT pulses    Special Tests None      Imaging Studies:   No new imaging    James R Lachman, MD  Foot & Ankle Surgery   Department of [address] [place] [address]      I personally performed the service  Larose Fothergill Lachman, MD

## 2022-01-18 NOTE — PATIENT INSTRUCTIONS
Achilles Tendonitis (Tendinitis)  Tendonitis a swelling and soreness of the tendon  The pain in the tendon (cord like structure which attaches muscle to bone) is produced by tiny tears and the inflammation present in that tendon  It commonly occurs at the shoulders, heels, and elbows  It is usually caused by overusing the tendon and joint involved  Achilles tendonitis involves the Achilles tendon  This is the large tendon in the back of the leg just above the foot  It attaches the large muscles of the lower leg to the heel bone (called calcaneus)  This diagnosis (learning what is wrong) is made by examination  X-rays will be generally be normal if only tendonitis is present  However, occasionally with insertional achilles tendinitis, there can be a calcification (enthesopathy/bone spur) noted  HOME CARE INSTRUCTIONS  · Apply ice to the injury for 10 to 20 minutes 3 or 4 times per day  Put the ice in a plastic bag and place a towel between the bag of ice and your skin  · Use Heel lifts as instructed  · Try to avoid use other than gentle range of motion while the tendon is painful  Do not resume use until instructed by your caregiver  Then begin use gradually  Do not increase use to the point of pain  If pain does develop, decrease use and continue the above measures  Gradually increase activities that do not cause discomfort until you gradually achieve normal use  · Only take over-the-counter or prescription medicines for pain, discomfort, or fever as directed by your caregiver  The Medrol dose pack, if prescribed, will only last a few days  It is important to follow the other instructions in order to see improvements that last   · Specific achilles stretches and rehab are usually required for tendinitis that does not improve with acute treatment   Typically these are done under the care of a physical therapist     [address] Maximilian Wagner IF:  · Your pain and swelling increase or pain is uncontrolled with medications  · You develop new, unexplained problems (symptoms) or an increase of the symptoms that brought you to your caregiver  · You develop an inability to move your toes or foot, develop warmth and swelling in your foot, or begin running an unexplained temperature  MAKE SURE YOU:   · Understand these instructions  · Will watch your condition  · Will get help right away if you are not doing well or get worse  Rehab  Achilles tendonitis is disorder of the Achilles tendon  The Achiles tendon connects the large calf muscles (Gastrocnemius and Soleus) to the heel bone (calcaneus)  This tendon is sometimes called the heel cord  It is important for pushing-off and standing on your toes and is important for walking, running, or jumping  Tendonitis often caused by overuse and repetitive microtrauma  SYMPTOMS  · Pain, tenderness, swelling, warmth, and redness may occur over the Achilles tendon even at rest    · Pain with pushing off, or flexing or extending the ankle  · Pain that is worsened after or during activity  CAUSES  · Over use sometimes seen with rapid increase in exercise programs or in sports requiring running and jumping  · Poor physical conditioning (strength and flexibility/endurance)  · Running sports, especially training running down hills  · Inadequate warm-up before practice or play or failure to stretch before participation  · Injury to the tendon  PREVENTION   · Warm up and stretch before practice or competition  · Allow time for adequate rest and recovery between practices and competition  · Keep up conditioning  · Keep up ankle and leg flexibility  · Improve or keep muscle strength and endurance  · Improve cardiovascular fitness  · Use proper technique  · Use of proper equipment (shoes, skates, etc)   · To help prevent recurrence, taping, protective strapping, or an adhesive bandage may be recommended for several weeks after healing is complete  PROGNOSIS  · Recovery may take weeks to several months to heal    · Longer recovery is expected if symptoms have been prolonged   · Recovery is usually quicker if the inflammation is due to a direct blow as compared with overuse or sudden strain  COMPLICATIONS   · Healing time will be prolonged if the condition is not correctly treated  The injury must be given plenty of time to heal    · Symptoms can reoccur if activity is resumed too soon  · Untreated, tendinitis may increase the risk of tendon rupture requiring additional time for recovery and possibly surgery  TREATMENT   · The first treatment consists of, rest, anti-inflammatory medication and ice to relieve the pain  · Stretching and strengthening exercises after resolution of pain, will likely help reduce the risk of recurrence  Referral to a physical therapist or  for further evaluation and treatment may be helpful  · A walking boot or cast may be recommended to rest the Achilles tendon  This can help break the cycle of inflammation and microtrauma  · Arch supports (orthotics ) may be prescribed or recommended by your caregiver as an adjunct to therapy and rest    · Surgery to remove the inflamed tendon lining or degenerated tendon tissue is rarely necessary and has shown less than predictable results  MEDICATION   · Nonsteroidal anti-inflammatory medications, such as aspirin and ibuprofen, may be used for pain and inflammation relief  Do not take within 7 days before surgery  Take these as directed by your caregiver  Contact your caregiver immediately if any bleeding, stomach upset, or signs of allergic reaction occur  Other minor pain relievers, such as acetaminophen, may also be used  · Pain relievers may be prescribed as necessary by your caregiver  Do not take prescription pain medication for longer than 4 to 7 days  Use only as directed and only as much as you need     · Cortisone injections are rarely if ever indicated  Cortisone injections may weaken tendons and predispose to rupture  It is better to give the condition more time to heal than to use them  HEAT AND COLD:   · Cold is used to relieve pain and reduce inflammation for acute and chronic Achilles tendinitis  Cold should be applied for 10 to 15 minutes every 2 to 3 hours for inflammation and pain and immediately after any activity that aggravates your symptoms  Use ice packs or an ice massage  · Heat may be used before performing stretching and strengthening activities prescribed by your caregiver  Use a heat pack or a warm soak  SEEK MEDICAL CARE IF:   · Symptoms get worse or do not improve in 2 weeks despite treatment  · New, unexplained symptoms develop  Drugs used in treatment may produce side effects  EXERCISES  RANGE OF MOTION AND STRETCHING EXERCISES - Achilles Tendinitis   These exercises may help you when beginning to rehabilitate your injury  Your symptoms may resolve with or without further involvement from your physician, physical therapist or   While completing these exercises, remember:   · Restoring tissue flexibility helps normal motion to return to the joints  This allows healthier, less painful movement and activity  · An effective stretch should be held for at least 30 seconds  · A stretch should never be painful  You should only feel a gentle lengthening or release in the stretched tissue  STRETCH - Gastroc, Standing   · Place hands on wall  · Extend leg, keeping the front knee somewhat bent  · Slightly point your toes inward on your back foot  · Keeping your heel on the floor and your knee straight, shift your weight toward the wall, not allowing your back to arch  · You should feel a gentle stretch in the calf  Hold this position for 10 seconds  STRETCH - Soleus, Standing   · Place hands on wall  · Extend leg, keeping the other knee somewhat bent     · Slightly point your toes inward on your back foot  · Keep your heel on the floor, bend your back knee, and slightly shift your weight over the back leg so that you feel a gentle stretch deep in your back calf  · Hold this position for 10 seconds     STRETCH - Gastrocsoleus, Standing   Note: This exercise can place a lot of stress on your foot and ankle  Please complete this exercise only if specifically instructed by your caregiver  · Place the ball of your foot on a step, keeping your other foot firmly on the same step  · Hold on to the wall or a rail for balance  · Slowly lift your other foot, allowing your body weight to press your heel down over the edge of the step  · You should feel a stretch in your calf  · Hold this position for 10 seconds  · Repeat this exercise with a slight bend in your knee  STRENGTHENING EXERCISES - Achilles Tendinitis  These exercises may help you when beginning to rehabilitate your injury  They may resolve your symptoms with or without further involvement from your physician, physical therapist or   While completing these exercises, remember:   · Muscles can gain both the endurance and the strength needed for everyday activities through controlled exercises  · Complete these exercises as instructed by your physician, physical therapist or   Progress the resistance and repetitions only as guided  · You may experience muscle soreness or fatigue, but the pain or discomfort you are trying to eliminate should never worsen during these exercises  If this pain does worsen, stop and make certain you are following the directions exactly  If the pain is still present after adjustments, discontinue the exercise until you can discuss the trouble with your clinician  STRENGTH - Plantar-flexors   · Sit with your affected leg extended  Holding onto both ends of a rubber exercise band/tubing, loop it around the ball of your foot  Keep a slight tension in the band  · Slowly push your toes away from you, pointing them downward  · Hold this position for 10 seconds  Return slowly, controlling the tension in the band/tubing  STRENGTH - Plantar-flexors   · Stand with your feet shoulder width apart  Steady yourself with a wall or table using as little support as needed  · Keeping your weight evenly spread over the width of your feet, rise up on your toes  STRENGTH - Plantar-flexors, Eccentric   Note: This exercise can place a lot of stress on your foot and ankle  Please complete this exercise only if specifically instructed by your caregiver  · Place the balls of your feet on a step  With your hands, use only enough support from a wall or rail to keep your balance  · Keep your knees straight and rise up on your toes  · Slowly shift your weight entirely to your toes and  your opposite foot  Gently and with controlled movement, lower your weight through your foot so that your heel drops below the level of the step  You will feel a slight stretch in the back of your calf at the end position  · Use the healthy leg to help rise up onto the balls of both feet, then lower weight only on the leg again  Build up to 15 repetitions  Then progress to 3 consecutive sets of 15 repetitions  *   · After completing the above exercise, complete the same exercise with a slight knee bend (about 30 degrees)  Again, build up to 15 repetitions  Then progress to 3 consecutive sets of 15 repetitions  *   *When you easily complete 3 sets of 15, your physician, physical therapist or  may advise you to add resistance by wearing a backpack filled with additional weight  STRENGTH - Plantar Flexors, Seated   · Sit on a chair that allows your feet to rest flat on the ground  If necessary, sit at the edge of the chair  · Keeping your toes firmly on the ground, lift your heel as far as you can without increasing any discomfort in your ankle         WEAR SUPPORTIVE SNEAKERS UNTIL THIS IMPROVES  Javed Cruz, [employer] are good brands but I recommend going to a dedicate shoe store (not [employer] or [employer]) will provide you access to shoe experts that will appropriately fit your shoes  At these types of stores, they have experts that can fit you for shoes appropriate for your foot problem  [facility]  [address] [facility], [address] [facility], [address] [facility]  [address] [facility], [address] [place] [address]     [facility]  [address] [facility] [address], [facility], [address] [facility], [address] [place] [address]

## 2022-03-22 ENCOUNTER — RA CDI HCC (OUTPATIENT)
Dept: OTHER | Facility: HOSPITAL | Age: 67
End: 2022-03-22

## 2022-03-22 NOTE — PROGRESS NOTES
Jennie [facility]  coding opportunities       Chart reviewed, no opportunity found:   [address]        Patients Insurance     Medicare Insurance: [employer]

## 2022-03-25 ENCOUNTER — APPOINTMENT (OUTPATIENT)
Dept: LAB | Facility: CLINIC | Age: 67
End: 2022-03-25
Payer: COMMERCIAL

## 2022-03-25 DIAGNOSIS — I10 ESSENTIAL HYPERTENSION: ICD-10-CM

## 2022-03-25 DIAGNOSIS — E78.2 MIXED HYPERLIPIDEMIA: ICD-10-CM

## 2022-03-25 LAB
ALBUMIN SERPL BCP-MCNC: 4.2 G/DL (ref 3.5–5)
ALP SERPL-CCNC: 71 U/L (ref 46–116)
ALT SERPL W P-5'-P-CCNC: 33 U/L (ref 12–78)
ANION GAP SERPL CALCULATED.3IONS-SCNC: 5 MMOL/L (ref 4–13)
AST SERPL W P-5'-P-CCNC: 17 U/L (ref 5–45)
BILIRUB SERPL-MCNC: 0.79 MG/DL (ref 0.2–1)
BUN SERPL-MCNC: 24 MG/DL (ref 5–25)
CALCIUM SERPL-MCNC: 9.8 MG/DL (ref 8.3–10.1)
CHLORIDE SERPL-SCNC: 105 MMOL/L (ref 100–108)
CHOLEST SERPL-MCNC: 191 MG/DL
CO2 SERPL-SCNC: 28 MMOL/L (ref 21–32)
CREAT SERPL-MCNC: 0.84 MG/DL (ref 0.6–1.3)
GFR SERPL CREATININE-BSD FRML MDRD: 71 ML/MIN/1.73SQ M
GLUCOSE P FAST SERPL-MCNC: 106 MG/DL (ref 65–99)
HDLC SERPL-MCNC: 56 MG/DL
LDLC SERPL CALC-MCNC: 106 MG/DL (ref 0–100)
NONHDLC SERPL-MCNC: 135 MG/DL
POTASSIUM SERPL-SCNC: 3.9 MMOL/L (ref 3.5–5.3)
PROT SERPL-MCNC: 7.7 G/DL (ref 6.4–8.2)
SODIUM SERPL-SCNC: 138 MMOL/L (ref 136–145)
TRIGL SERPL-MCNC: 144 MG/DL

## 2022-03-25 PROCEDURE — 80061 LIPID PANEL: CPT

## 2022-03-25 PROCEDURE — 36415 COLL VENOUS BLD VENIPUNCTURE: CPT

## 2022-03-25 PROCEDURE — 80053 COMPREHEN METABOLIC PANEL: CPT

## 2022-04-05 ENCOUNTER — OFFICE VISIT (OUTPATIENT)
Dept: FAMILY MEDICINE CLINIC | Facility: CLINIC | Age: 67
End: 2022-04-05
Payer: COMMERCIAL

## 2022-04-05 VITALS
RESPIRATION RATE: 16 BRPM | OXYGEN SATURATION: 98 % | HEIGHT: 60 IN | HEART RATE: 71 BPM | WEIGHT: 154 LBS | BODY MASS INDEX: 30.23 KG/M2 | SYSTOLIC BLOOD PRESSURE: 120 MMHG | DIASTOLIC BLOOD PRESSURE: 84 MMHG

## 2022-04-05 DIAGNOSIS — G89.29 CHRONIC RIGHT SHOULDER PAIN: ICD-10-CM

## 2022-04-05 DIAGNOSIS — E78.2 MIXED HYPERLIPIDEMIA: Primary | ICD-10-CM

## 2022-04-05 DIAGNOSIS — M25.511 CHRONIC RIGHT SHOULDER PAIN: ICD-10-CM

## 2022-04-05 DIAGNOSIS — R73.01 ELEVATED FASTING BLOOD SUGAR: ICD-10-CM

## 2022-04-05 DIAGNOSIS — M15.9 PRIMARY OSTEOARTHRITIS INVOLVING MULTIPLE JOINTS: ICD-10-CM

## 2022-04-05 DIAGNOSIS — I10 ESSENTIAL HYPERTENSION: ICD-10-CM

## 2022-04-05 PROCEDURE — 1160F RVW MEDS BY RX/DR IN RCRD: CPT | Performed by: FAMILY MEDICINE

## 2022-04-05 PROCEDURE — 3074F SYST BP LT 130 MM HG: CPT | Performed by: FAMILY MEDICINE

## 2022-04-05 PROCEDURE — 3079F DIAST BP 80-89 MM HG: CPT | Performed by: FAMILY MEDICINE

## 2022-04-05 PROCEDURE — 3008F BODY MASS INDEX DOCD: CPT | Performed by: FAMILY MEDICINE

## 2022-04-05 PROCEDURE — 1036F TOBACCO NON-USER: CPT | Performed by: FAMILY MEDICINE

## 2022-04-05 PROCEDURE — 99214 OFFICE O/P EST MOD 30 MIN: CPT | Performed by: FAMILY MEDICINE

## 2022-04-05 RX ORDER — ATORVASTATIN CALCIUM 10 MG/1
10 TABLET, FILM COATED ORAL DAILY
Qty: 90 TABLET | Refills: 1 | Status: SHIPPED | OUTPATIENT
Start: 2022-04-05

## 2022-04-05 RX ORDER — HYDROCHLOROTHIAZIDE 25 MG/1
25 TABLET ORAL DAILY
Qty: 90 TABLET | Refills: 1 | Status: SHIPPED | OUTPATIENT
Start: 2022-04-05

## 2022-04-05 RX ORDER — VALSARTAN 160 MG/1
160 TABLET ORAL DAILY
Qty: 90 TABLET | Refills: 1 | Status: SHIPPED | OUTPATIENT
Start: 2022-04-05

## 2022-04-05 RX ORDER — MELOXICAM 7.5 MG/1
7.5 TABLET ORAL DAILY
Qty: 90 TABLET | Refills: 0 | Status: SHIPPED | OUTPATIENT
Start: 2022-04-05 | End: 2022-07-07

## 2022-04-05 NOTE — ASSESSMENT & PLAN NOTE
SYMPTOMS WELL CONTROLLED ON MELOXICAM   PATIENT REQUESTING TO REDUCE THE DOSE TO 7 5 MILLIGRAM, INTERMITTENTLY AS NEEDED

## 2022-04-05 NOTE — PROGRESS NOTES
Subjective:      Patient ID: Audrey Glass is a 74 y o  female  Hypertension  This is a chronic problem  The current episode started more than 1 year ago  The problem is controlled  Pertinent negatives include no chest pain, headaches, palpitations or shortness of breath  Risk factors for coronary artery disease include dyslipidemia and post-menopausal state  Past treatments include angiotensin blockers and diuretics  The current treatment provides significant improvement  There are no compliance problems  Hyperlipidemia  This is a chronic problem  The current episode started more than 1 year ago  The problem is controlled  Recent lipid tests were reviewed and are normal  Pertinent negatives include no chest pain, myalgias or shortness of breath  Current antihyperlipidemic treatment includes statins  The current treatment provides significant improvement of lipids  Patient noted to have borderline fasting blood sugar of 103  No symptoms of polyuria or polydipsia  Also has generalized osteoarthritis, currently symptoms well controlled on NSAIDs which was started by her orthopedic specialist   Patient is currently on 15 milligrams dose of meloxicam, which she takes infrequently to help with any flare ups  requesting to reduce the dose to 7 5 milligram       Past Medical History:   Diagnosis Date    Allergic 2010    Hive reaction to Macrodantin, 2014 - reaction to shrimp    Arthritis 2012? take Meloxicam, Glucosamine & Chondroitin    Hypertension 2000?     take hydrochlorothiazide, Valsartan & Atorvastatin    Urinary tract infection 1975    Occasional bladder infections throughout adulthood       Family History   Problem Relation Age of Onset    Depression Mother         Bi-polar    Cancer Mother         Non-small cell lung cancer (non-smoker)    Bipolar disorder Mother     Dementia Maternal Grandmother     Dementia Maternal Uncle     Hypertension Father     Heart disease Father     Hearing loss Father     Hypertension Brother     Heart disease Maternal Grandfather     Diabetes Maternal Grandfather     Completed Suicide  Maternal Uncle     No Known Problems Daughter     No Known Problems Daughter     No Known Problems Paternal Aunt        Past Surgical History:   Procedure Laterality Date     SECTION  1980    EYE SURGERY  2020    cataract surgery R eye    HYSTERECTOMY  2000    OOPHORECTOMY      PAROTIDECTOMY          reports that she has never smoked  She has never used smokeless tobacco  She reports current alcohol use of about 1 0 standard drink of alcohol per week  She reports that she does not use drugs  Current Outpatient Medications:     ascorbic acid (VITAMIN C) 500 MG tablet, Take 500 mg by mouth, Disp: , Rfl:     atorvastatin (LIPITOR) 10 mg tablet, Take 1 tablet (10 mg total) by mouth daily, Disp: 90 tablet, Rfl: 1    cholecalciferol (VITAMIN D3) 400 units tablet, Take 400 Units by mouth, Disp: , Rfl:     clobetasol (TEMOVATE) 0 05 % ointment, Apply topically 2 (two) times a day, Disp: 45 g, Rfl: 0    Echinacea 125 MG CAPS, Take by mouth, Disp: , Rfl:     glucosamine-chondroitin 500-400 MG tablet, Take 2 tablets by mouth , Disp: , Rfl:     hydrochlorothiazide (HYDRODIURIL) 25 mg tablet, Take 1 tablet (25 mg total) by mouth daily, Disp: 90 tablet, Rfl: 1    Multiple Vitamins-Minerals (MULTIVITAMIN ADULT EXTRA C PO), Take 1 tablet by mouth, Disp: , Rfl:     valsartan (DIOVAN) 160 mg tablet, Take 1 tablet (160 mg total) by mouth daily, Disp: 90 tablet, Rfl: 1    meloxicam (Mobic) 7 5 mg tablet, Take 1 tablet (7 5 mg total) by mouth daily, Disp: 90 tablet, Rfl: 0    The following portions of the patient's history were reviewed and updated as appropriate: allergies, current medications, past family history, past medical history, past social history, past surgical history and problem list     Review of Systems   Constitutional: Negative      Respiratory: Negative  Negative for shortness of breath  Cardiovascular: Negative  Negative for chest pain and palpitations  Musculoskeletal: Negative for myalgias  Neurological: Negative  Negative for headaches  Psychiatric/Behavioral: Negative  Objective:    /84 (BP Location: Left arm, Patient Position: Sitting, Cuff Size: Standard)   Pulse 71   Resp 16   Ht 5' (1 524 m)   Wt 69 9 kg (154 lb)   SpO2 98%   BMI 30 08 kg/m²      Physical Exam  Constitutional:       Appearance: She is well-developed  Cardiovascular:      Rate and Rhythm: Normal rate and regular rhythm  Heart sounds: Normal heart sounds  Pulmonary:      Effort: Pulmonary effort is normal       Breath sounds: Normal breath sounds  Abdominal:      General: Bowel sounds are normal       Palpations: Abdomen is soft  Neurological:      Mental Status: She is alert and oriented to person, place, and time     Psychiatric:         Behavior: Behavior normal          Judgment: Judgment normal            Recent Results (from the past 1008 hour(s))   Comprehensive metabolic panel    Collection Time: 03/25/22  9:01 AM   Result Value Ref Range    Sodium 138 136 - 145 mmol/L    Potassium 3 9 3 5 - 5 3 mmol/L    Chloride 105 100 - 108 mmol/L    CO2 28 21 - 32 mmol/L    ANION GAP 5 4 - 13 mmol/L    BUN 24 5 - 25 mg/dL    Creatinine 0 84 0 60 - 1 30 mg/dL    Glucose, Fasting 106 (H) 65 - 99 mg/dL    Calcium 9 8 8 3 - 10 1 mg/dL    AST 17 5 - 45 U/L    ALT 33 12 - 78 U/L    Alkaline Phosphatase 71 46 - 116 U/L    Total Protein 7 7 6 4 - 8 2 g/dL    Albumin 4 2 3 5 - 5 0 g/dL    Total Bilirubin 0 79 0 20 - 1 00 mg/dL    eGFR 71 ml/min/1 73sq m   Lipid panel    Collection Time: 03/25/22  9:01 AM   Result Value Ref Range    Cholesterol 191 See Comment mg/dL    Triglycerides 144 See Comment mg/dL    HDL, Direct 56 >=50 mg/dL    LDL Calculated 106 (H) 0 - 100 mg/dL    Non-HDL-Chol (CHOL-HDL) 135 mg/dl       Assessment/Plan:         Problem List Items Addressed This Visit        Cardiovascular and Mediastinum    Essential hypertension     Patient's blood pressure is at goal   Continue valsartan 160, hydrochlorothiazide 25 mg  Relevant Medications    hydrochlorothiazide (HYDRODIURIL) 25 mg tablet    valsartan (DIOVAN) 160 mg tablet       Musculoskeletal and Integument    Primary osteoarthritis involving multiple joints     SYMPTOMS WELL CONTROLLED ON MELOXICAM   PATIENT REQUESTING TO REDUCE THE DOSE TO 7 5 MILLIGRAM, INTERMITTENTLY AS NEEDED  Relevant Medications    meloxicam (Mobic) 7 5 mg tablet       Other    Mixed hyperlipidemia - Primary     LDL is at goal   Continue atorvastatin 10 milligram   Metabolic labs/follow-up at 6 month  Relevant Medications    atorvastatin (LIPITOR) 10 mg tablet    Other Relevant Orders    Lipid panel    Lipid panel    Right shoulder pain    Relevant Medications    meloxicam (Mobic) 7 5 mg tablet    Elevated fasting blood sugar     Patient advised low carb diet  Check A1c to rule out diabetes  Relevant Orders    Comprehensive metabolic panel    Hemoglobin A1C    Comprehensive metabolic panel    Hemoglobin A1C        BMI Counseling: Body mass index is 30 08 kg/m²  The BMI is above normal  Nutrition recommendations include decreasing overall calorie intake

## 2022-07-07 DIAGNOSIS — M25.511 CHRONIC RIGHT SHOULDER PAIN: ICD-10-CM

## 2022-07-07 DIAGNOSIS — M15.9 PRIMARY OSTEOARTHRITIS INVOLVING MULTIPLE JOINTS: ICD-10-CM

## 2022-07-07 DIAGNOSIS — G89.29 CHRONIC RIGHT SHOULDER PAIN: ICD-10-CM

## 2022-07-07 RX ORDER — MELOXICAM 7.5 MG/1
TABLET ORAL
Qty: 90 TABLET | Refills: 0 | Status: SHIPPED | OUTPATIENT
Start: 2022-07-07 | End: 2022-10-10 | Stop reason: SDUPTHER

## 2022-07-18 ENCOUNTER — HOSPITAL ENCOUNTER (OUTPATIENT)
Dept: RADIOLOGY | Age: 67
Discharge: HOME | End: 2022-07-18
Payer: COMMERCIAL

## 2022-07-18 DIAGNOSIS — M81.0 OSTEOPOROSIS, UNSPECIFIED OSTEOPOROSIS TYPE, UNSPECIFIED PATHOLOGICAL FRACTURE PRESENCE: ICD-10-CM

## 2022-07-18 PROCEDURE — 77080 DXA BONE DENSITY AXIAL: CPT

## 2022-07-26 ENCOUNTER — TELEPHONE (OUTPATIENT)
Dept: FAMILY MEDICINE CLINIC | Facility: CLINIC | Age: 67
End: 2022-07-26

## 2022-07-26 NOTE — TELEPHONE ENCOUNTER
----- Message from Maico Thomas MD sent at 7/25/2022  6:30 PM EDT -----  Please call the patient regarding her abnormal result   Has osteopenia, should start Calcium, vit d 1000 iu daily

## 2022-09-19 ENCOUNTER — VBI (OUTPATIENT)
Dept: ADMINISTRATIVE | Facility: OTHER | Age: 67
End: 2022-09-19

## 2022-10-02 ENCOUNTER — RA CDI HCC (OUTPATIENT)
Dept: OTHER | Facility: HOSPITAL | Age: 67
End: 2022-10-02

## 2022-10-02 NOTE — PROGRESS NOTES
Jennie [facility] 75  coding opportunities       Chart reviewed, no opportunity found:   [address]        Patients Insurance     Medicare Insurance: [employer]

## 2022-10-05 ENCOUNTER — APPOINTMENT (OUTPATIENT)
Dept: LAB | Facility: CLINIC | Age: 67
End: 2022-10-05
Payer: COMMERCIAL

## 2022-10-05 DIAGNOSIS — R73.01 ELEVATED FASTING BLOOD SUGAR: ICD-10-CM

## 2022-10-05 DIAGNOSIS — E78.2 MIXED HYPERLIPIDEMIA: ICD-10-CM

## 2022-10-05 LAB
ALBUMIN SERPL BCP-MCNC: 3.6 G/DL (ref 3.5–5)
ALP SERPL-CCNC: 70 U/L (ref 46–116)
ALT SERPL W P-5'-P-CCNC: 33 U/L (ref 12–78)
ANION GAP SERPL CALCULATED.3IONS-SCNC: 6 MMOL/L (ref 4–13)
AST SERPL W P-5'-P-CCNC: 22 U/L (ref 5–45)
BILIRUB SERPL-MCNC: 0.67 MG/DL (ref 0.2–1)
BUN SERPL-MCNC: 14 MG/DL (ref 5–25)
CALCIUM SERPL-MCNC: 9.7 MG/DL (ref 8.3–10.1)
CHLORIDE SERPL-SCNC: 106 MMOL/L (ref 96–108)
CHOLEST SERPL-MCNC: 179 MG/DL
CO2 SERPL-SCNC: 27 MMOL/L (ref 21–32)
CREAT SERPL-MCNC: 0.82 MG/DL (ref 0.6–1.3)
EST. AVERAGE GLUCOSE BLD GHB EST-MCNC: 126 MG/DL
GFR SERPL CREATININE-BSD FRML MDRD: 73 ML/MIN/1.73SQ M
GLUCOSE P FAST SERPL-MCNC: 94 MG/DL (ref 65–99)
HBA1C MFR BLD: 6 %
HDLC SERPL-MCNC: 51 MG/DL
LDLC SERPL CALC-MCNC: 98 MG/DL (ref 0–100)
NONHDLC SERPL-MCNC: 128 MG/DL
POTASSIUM SERPL-SCNC: 4.2 MMOL/L (ref 3.5–5.3)
PROT SERPL-MCNC: 7.7 G/DL (ref 6.4–8.4)
SODIUM SERPL-SCNC: 139 MMOL/L (ref 135–147)
TRIGL SERPL-MCNC: 149 MG/DL

## 2022-10-10 ENCOUNTER — OFFICE VISIT (OUTPATIENT)
Dept: FAMILY MEDICINE CLINIC | Facility: CLINIC | Age: 67
End: 2022-10-10
Payer: COMMERCIAL

## 2022-10-10 VITALS
SYSTOLIC BLOOD PRESSURE: 130 MMHG | DIASTOLIC BLOOD PRESSURE: 80 MMHG | BODY MASS INDEX: 29.07 KG/M2 | HEART RATE: 88 BPM | OXYGEN SATURATION: 99 % | HEIGHT: 61 IN | WEIGHT: 154 LBS

## 2022-10-10 DIAGNOSIS — Z12.31 VISIT FOR SCREENING MAMMOGRAM: ICD-10-CM

## 2022-10-10 DIAGNOSIS — M81.0 OSTEOPOROSIS, UNSPECIFIED OSTEOPOROSIS TYPE, UNSPECIFIED PATHOLOGICAL FRACTURE PRESENCE: ICD-10-CM

## 2022-10-10 DIAGNOSIS — M25.511 CHRONIC RIGHT SHOULDER PAIN: ICD-10-CM

## 2022-10-10 DIAGNOSIS — R73.03 PREDIABETES: ICD-10-CM

## 2022-10-10 DIAGNOSIS — M15.9 PRIMARY OSTEOARTHRITIS INVOLVING MULTIPLE JOINTS: ICD-10-CM

## 2022-10-10 DIAGNOSIS — Z23 NEED FOR VACCINATION: ICD-10-CM

## 2022-10-10 DIAGNOSIS — E78.2 MIXED HYPERLIPIDEMIA: ICD-10-CM

## 2022-10-10 DIAGNOSIS — I10 ESSENTIAL HYPERTENSION: Primary | ICD-10-CM

## 2022-10-10 DIAGNOSIS — G89.29 CHRONIC RIGHT SHOULDER PAIN: ICD-10-CM

## 2022-10-10 DIAGNOSIS — M79.672 LEFT FOOT PAIN: ICD-10-CM

## 2022-10-10 DIAGNOSIS — Z00.00 MEDICARE ANNUAL WELLNESS VISIT, SUBSEQUENT: ICD-10-CM

## 2022-10-10 PROCEDURE — G0008 ADMIN INFLUENZA VIRUS VAC: HCPCS

## 2022-10-10 PROCEDURE — 99214 OFFICE O/P EST MOD 30 MIN: CPT

## 2022-10-10 PROCEDURE — 90662 IIV NO PRSV INCREASED AG IM: CPT

## 2022-10-10 PROCEDURE — G0439 PPPS, SUBSEQ VISIT: HCPCS

## 2022-10-10 RX ORDER — VALSARTAN 160 MG/1
160 TABLET ORAL DAILY
Qty: 90 TABLET | Refills: 1 | Status: SHIPPED | OUTPATIENT
Start: 2022-10-10

## 2022-10-10 RX ORDER — MELOXICAM 7.5 MG/1
7.5 TABLET ORAL DAILY
Qty: 90 TABLET | Refills: 0 | Status: SHIPPED | OUTPATIENT
Start: 2022-10-10

## 2022-10-10 RX ORDER — MELOXICAM 7.5 MG/1
TABLET ORAL
Qty: 90 TABLET | Refills: 0 | OUTPATIENT
Start: 2022-10-10

## 2022-10-10 RX ORDER — HYDROCHLOROTHIAZIDE 25 MG/1
25 TABLET ORAL DAILY
Qty: 90 TABLET | Refills: 1 | Status: SHIPPED | OUTPATIENT
Start: 2022-10-10

## 2022-10-10 RX ORDER — ATORVASTATIN CALCIUM 10 MG/1
10 TABLET, FILM COATED ORAL DAILY
Qty: 90 TABLET | Refills: 1 | Status: SHIPPED | OUTPATIENT
Start: 2022-10-10

## 2022-10-10 NOTE — ASSESSMENT & PLAN NOTE
Related to callus on the lateral aspect of the he  Patient has issue with insert and a lateral wedge  Recommended cold compresses  Her a refer to podiatry for treatment of the callus buildup

## 2022-10-10 NOTE — ASSESSMENT & PLAN NOTE
Patient noted to have A1c of 6 0  Fasting sugar is normal   Continue with low carb diet/exercise  Continue monitoring

## 2022-10-10 NOTE — ASSESSMENT & PLAN NOTE
Patient took Fosamax for at least 5 years  Discontinued thereafter  Last DEXA scan 2022- osteopenia     Continue with OTC Ca/ Vit D

## 2022-10-10 NOTE — PROGRESS NOTES
Assessment and Plan:     Problem List Items Addressed This Visit    None        1  Essential hypertension  Assessment & Plan:  Patient's blood pressure is at goal   Continue valsartan 160, hydrochlorothiazide 25 mg  Orders:  -     hydrochlorothiazide (HYDRODIURIL) 25 mg tablet; Take 1 tablet (25 mg total) by mouth daily  -     valsartan (DIOVAN) 160 mg tablet; Take 1 tablet (160 mg total) by mouth daily    2  Mixed hyperlipidemia  Assessment & Plan:  LDL is at goal   Continue atorvastatin 10 milligram   Metabolic labs/follow-up at 6 month  Orders:  -     atorvastatin (LIPITOR) 10 mg tablet; Take 1 tablet (10 mg total) by mouth daily  -     Lipid panel; Future; Expected date: 03/09/2023    3  Prediabetes  Assessment & Plan:  Patient noted to have A1c of 6 0  Fasting sugar is normal   Continue with low carb diet/exercise  Continue monitoring  Orders:  -     Comprehensive metabolic panel; Future; Expected date: 03/09/2023  -     Hemoglobin A1C; Future; Expected date: 03/09/2023    4  Medicare annual wellness visit, subsequent  Comments:  UTD, received Influenza vaccine  5  Visit for screening mammogram  -     Mammo screening bilateral w 3d & cad; Future; Expected date: 10/10/2022    6  Need for vaccination  -     influenza vaccine, high-dose, PF 0 7 mL (FLUZONE HIGH-DOSE)    7  Left foot pain  Assessment & Plan:  Related to callus on the lateral aspect of the he  Patient has issue with insert and a lateral wedge  Recommended cold compresses  Her a refer to podiatry for treatment of the callus buildup  Orders:  -     Ambulatory Referral to Podiatry; Future    8  Osteoporosis, unspecified osteoporosis type, unspecified pathological fracture presence  Assessment & Plan:  Patient took Fosamax for at least 5 years  Discontinued thereafter  Last DEXA scan 2022- osteopenia  Continue with OTC Ca/ Vit D               Depression Screening and Follow-up Plan: Patient was screened for depression during today's encounter  They screened negative with a PHQ-2 score of 0  Urinary Incontinence Plan of Care: counseling topics discussed: bladder retraining  Preventive health issues were discussed with patient, and age appropriate screening tests were ordered as noted in patient's After Visit Summary  Personalized health advice and appropriate referrals for health education or preventive services given if needed, as noted in patient's After Visit Summary  History of Present Illness:     Patient presents for a Medicare Wellness Visit    Hypertension  This is a chronic problem  The current episode started more than 1 year ago  The problem is controlled  Pertinent negatives include no chest pain, palpitations or shortness of breath  Risk factors for coronary artery disease include dyslipidemia and post-menopausal state  Past treatments include angiotensin blockers and diuretics  The current treatment provides significant improvement  There are no compliance problems  Hyperlipidemia  This is a chronic problem  The current episode started more than 1 year ago  The problem is controlled  Recent lipid tests were reviewed and are normal  Pertinent negatives include no chest pain, myalgias or shortness of breath  Current antihyperlipidemic treatment includes statins  The current treatment provides significant improvement of lipids  There are no compliance problems  Risk factors for coronary artery disease include hypertension, dyslipidemia and post-menopausal       Patient has history of borderline fasting blood sugar, this time fasting sugar is normal however A1c was noted to be 6  Patient has been modifying her diet, improving physical activity  Reports pain on the lateral aspect of the left foot    Patient uses shoe insert along with a lateral wedge support, for arch support as advised by her previous podiatrist       Patient Care Team:  Sav Peace MD as PCP - General (Family Medicine)     Review of Systems:     Review of Systems   Constitutional: Negative  Respiratory: Negative  Negative for shortness of breath  Cardiovascular: Negative  Negative for chest pain and palpitations  Musculoskeletal: Negative for myalgias  Neurological: Negative  Psychiatric/Behavioral: Negative  Problem List:     Patient Active Problem List   Diagnosis   • Essential hypertension   • Mixed hyperlipidemia   • Visit for screening mammogram   • Primary osteoarthritis involving multiple joints   • Eczema   • Right shoulder pain   • Biceps tendinitis of right shoulder   • Need for vaccination   • Osteoporosis   • Arthritis of right glenohumeral joint   • Elevated fasting blood sugar      Past Medical and Surgical History:     Past Medical History:   Diagnosis Date   • Allergic     Hive reaction to Macrodantin,  - reaction to shrimp   • Arthritis ? take Meloxicam, Glucosamine & Chondroitin   • Hypertension ?     take hydrochlorothiazide, Valsartan & Atorvastatin   • Urinary tract infection 1975    Occasional bladder infections throughout adulthood     Past Surgical History:   Procedure Laterality Date   •  SECTION  1980   • EYE SURGERY  2020    cataract surgery R eye   • HYSTERECTOMY  2000   • OOPHORECTOMY     • PAROTIDECTOMY        Family History:     Family History   Problem Relation Age of Onset   • Depression Mother         Bi-polar   • Cancer Mother         Non-small cell lung cancer (non-smoker)   • Bipolar disorder Mother    • Dementia Maternal Grandmother    • Dementia Maternal Uncle    • Hypertension Father    • Heart disease Father    • Hearing loss Father    • Hypertension Brother    • Heart disease Maternal Grandfather    • Diabetes Maternal Grandfather    • Completed Suicide  Maternal Uncle    • No Known Problems Daughter    • No Known Problems Daughter    • No Known Problems Paternal Aunt       Social History:     Social History     Socioeconomic History   • Marital status: /Civil Union     Spouse name: Not on file   • Number of children: Not on file   • Years of education: Not on file   • Highest education level: Not on file   Occupational History   • Not on file   Tobacco Use   • Smoking status: Never Smoker   • Smokeless tobacco: Never Used   Vaping Use   • Vaping Use: Never used   Substance and Sexual Activity   • Alcohol use: Yes     Alcohol/week: 1 0 standard drink     Types: 1 Glasses of wine per week     Comment: Very limited  Less than 1 per week     • Drug use: Never   • Sexual activity: Not Currently     Partners: Male     Birth control/protection: Post-menopausal, None   Other Topics Concern   • Not on file   Social History Narrative   • Not on file     Social Determinants of Health     Financial Resource Strain: Not on file   Food Insecurity: Not on file   Transportation Needs: Not on file   Physical Activity: Not on file   Stress: Not on file   Social Connections: Not on file   Intimate Partner Violence: Not on file   Housing Stability: Not on file      Medications and Allergies:     Current Outpatient Medications   Medication Sig Dispense Refill   • ascorbic acid (VITAMIN C) 500 MG tablet Take 500 mg by mouth     • atorvastatin (LIPITOR) 10 mg tablet Take 1 tablet (10 mg total) by mouth daily 90 tablet 1   • cholecalciferol (VITAMIN D3) 400 units tablet Take 400 Units by mouth     • clobetasol (TEMOVATE) 0 05 % ointment Apply topically 2 (two) times a day 45 g 0   • Echinacea 125 MG CAPS Take by mouth     • glucosamine-chondroitin 500-400 MG tablet Take 2 tablets by mouth      • hydrochlorothiazide (HYDRODIURIL) 25 mg tablet Take 1 tablet (25 mg total) by mouth daily 90 tablet 1   • meloxicam (MOBIC) 7 5 mg tablet TAKE 1 TABLET BY MOUTH EVERY DAY 90 tablet 0   • Multiple Vitamins-Minerals (MULTIVITAMIN ADULT EXTRA C PO) Take 1 tablet by mouth     • valsartan (DIOVAN) 160 mg tablet Take 1 tablet (160 mg total) by mouth daily 90 tablet 1     No current facility-administered medications for this visit  Allergies   Allergen Reactions   • Nitrofurantoin Hives   • Shrimp Extract Allergy Skin Test - Food Allergy Hives     Scratchy throat    • Pneumococcal Vaccine Other (See Comments)   • Shellfish Allergy - Food Allergy Hives      Immunizations:     Immunization History   Administered Date(s) Administered   • COVID-19 MODERNA VACC 0 25 ML IM BOOSTER 11/02/2021   • COVID-19 MODERNA VACC 0 5 ML IM 02/10/2021, 03/10/2021   • Influenza, high dose seasonal 0 7 mL 10/19/2021   • Tdap 10/19/2021      Health Maintenance:         Topic Date Due   • Hepatitis C Screening  Never done   • Breast Cancer Screening: Mammogram  09/02/2022   • Colorectal Cancer Screening  11/12/2023         Topic Date Due   • Pneumococcal Vaccine: 65+ Years (1 - PCV) Never done   • COVID-19 Vaccine (4 - Booster for Moderna series) 03/02/2022   • Influenza Vaccine (1) 09/01/2022      Medicare Screening Tests and Risk Assessments: Zohreh Upton is here for her Subsequent Wellness visit  Last Medicare Wellness visit information reviewed, patient interviewed, no change since last AWV  Health Risk Assessment:   Patient rates overall health as good  Patient feels that their physical health rating is slightly worse  Patient is very satisfied with their life  Eyesight was rated as slightly worse  Hearing was rated as same  Patient feels that their emotional and mental health rating is slightly better  Patients states they are sometimes angry  Patient states they are never, rarely unusually tired/fatigued  Pain experienced in the last 7 days has been some  Patient's pain rating has been 5/10  Patient states that she has experienced weight loss or gain in last 6 months  Fall Risk Screening: In the past year, patient has experienced: no history of falling in past year      Urinary Incontinence Screening:   Patient has leaked urine accidently in the last six months       Home Safety:  Patient does not have trouble with stairs inside or outside of their home  Patient has working smoke alarms and has no working carbon monoxide detector  Home safety hazards include: loose rugs on the floor  Nutrition:   Current diet is Regular  Medications:   Patient is currently taking over-the-counter supplements  OTC medications include: Multi vitamin, glucosamine and chondroitin, D3, echinacea  Patient is able to manage medications  Activities of Daily Living (ADLs)/Instrumental Activities of Daily Living (IADLs):   Walk and transfer into and out of bed and chair?: Yes  Dress and groom yourself?: Yes    Bathe or shower yourself?: Yes    Feed yourself? Yes  Do your laundry/housekeeping?: Yes  Manage your money, pay your bills and track your expenses?: Yes  Make your own meals?: Yes    Do your own shopping?: Yes    Previous Hospitalizations:   Any hospitalizations or ED visits within the last 12 months?: Yes    How many hospitalizations have you had in the last year?: 1-2    Hospitalization Comments: I visited an orthopedic urgent care facility due to left foot pain      Advance Care Planning:   Living will: No    Durable POA for healthcare: No    Advanced directive: No      Cognitive Screening:   Provider or family/friend/caregiver concerned regarding cognition?: No    PREVENTIVE SCREENINGS      Cardiovascular Screening:    General: Screening Not Indicated and History Lipid Disorder      Diabetes Screening:     General: Screening Current      Colorectal Cancer Screening:     General: Screening Current      Breast Cancer Screening:     General: Screening Current      Cervical Cancer Screening:    General: Screening Not Indicated      Osteoporosis Screening:    General: Screening Not Indicated and History Osteoporosis      Abdominal Aortic Aneurysm (AAA) Screening:        General: Risks and Benefits Discussed and Screening Not Indicated      Lung Cancer Screening:     General: Screening Not Indicated Hepatitis C Screening:    General: Screening Current    Screening, Brief Intervention, and Referral to Treatment (SBIRT)    Screening  Typical number of drinks in a day: 0  Typical number of drinks in a week: 0  Interpretation: Low risk drinking behavior  Single Item Drug Screening:  How often have you used an illegal drug (including marijuana) or a prescription medication for non-medical reasons in the past year? never    Single Item Drug Screen Score: 0  Interpretation: Negative screen for possible drug use disorder    Other Counseling Topics:   Car/seat belt/driving safety, skin self-exam, sunscreen and regular weightbearing exercise and calcium and vitamin D intake  No exam data present     Physical Exam:     /80   Pulse 88   Ht 5' 1" (1 549 m)   Wt 69 9 kg (154 lb)   SpO2 99%   BMI 29 10 kg/m²     Physical Exam  Vitals and nursing note reviewed  HENT:      Right Ear: External ear normal       Left Ear: External ear normal    Eyes:      Conjunctiva/sclera: Conjunctivae normal    Cardiovascular:      Rate and Rhythm: Normal rate and regular rhythm  Heart sounds: Normal heart sounds  Pulmonary:      Effort: Pulmonary effort is normal       Breath sounds: Normal breath sounds  Abdominal:      General: Bowel sounds are normal       Palpations: Abdomen is soft  Musculoskeletal:         General: Tenderness (lateral aspect of left sole, with callus buildup  ) present  Normal range of motion  Cervical back: Normal range of motion and neck supple  Feet:    Skin:     General: Skin is warm  Neurological:      Mental Status: She is alert and oriented to person, place, and time  Psychiatric:         Behavior: Behavior normal          Thought Content:  Thought content normal          Judgment: Judgment normal           Cassie Peres MD

## 2022-10-10 NOTE — PATIENT INSTRUCTIONS
Medicare Preventive Visit Patient Instructions  Thank you for completing your Welcome to Medicare Visit or Medicare Annual Wellness Visit today  Your next wellness visit will be due in one year (10/11/2023)  The screening/preventive services that you may require over the next 5-10 years are detailed below  Some tests may not apply to you based off risk factors and/or age  Screening tests ordered at today's visit but not completed yet may show as past due  Also, please note that scanned in results may not display below  Preventive Screenings:  Service Recommendations Previous Testing/Comments   Colorectal Cancer Screening  * Colonoscopy    * Fecal Occult Blood Test (FOBT)/Fecal Immunochemical Test (FIT)  * Fecal DNA/Cologuard Test  * Flexible Sigmoidoscopy Age: 39-70 years old   Colonoscopy: every 10 years (may be performed more frequently if at higher risk)  OR  FOBT/FIT: every 1 year  OR  Cologuard: every 3 years  OR  Sigmoidoscopy: every 5 years  Screening may be recommended earlier than age 39 if at higher risk for colorectal cancer  Also, an individualized decision between you and your healthcare provider will decide whether screening between the ages of 74-80 would be appropriate  Colonoscopy: 11/12/2018  FOBT/FIT: Not on file  Cologuard: Not on file  Sigmoidoscopy: Not on file    Screening Current     Breast Cancer Screening Age: 36 years old  Frequency: every 1-2 years  Not required if history of left and right mastectomy Mammogram: 09/02/2021    Screening Current   Cervical Cancer Screening Between the ages of 21-29, pap smear recommended once every 3 years  Between the ages of 33-67, can perform pap smear with HPV co-testing every 5 years     Recommendations may differ for women with a history of total hysterectomy, cervical cancer, or abnormal pap smears in past  Pap Smear: Not on file    Screening Not Indicated   Hepatitis C Screening Once for adults born between 1945 and 1965  More frequently in patients at high risk for Hepatitis C Hep C Antibody: Not on file    Screening Current   Diabetes Screening 1-2 times per year if you're at risk for diabetes or have pre-diabetes Fasting glucose: 94 mg/dL (10/5/2022)  A1C: 6 0 % (10/5/2022)  Screening Current   Cholesterol Screening Once every 5 years if you don't have a lipid disorder  May order more often based on risk factors  Lipid panel: 10/05/2022    Screening Not Indicated  History Lipid Disorder     Other Preventive Screenings Covered by Medicare:  1  Abdominal Aortic Aneurysm (AAA) Screening: covered once if your at risk  You're considered to be at risk if you have a family history of AAA  2  Lung Cancer Screening: covers low dose CT scan once per year if you meet all of the following conditions: (1) Age 50-69; (2) No signs or symptoms of lung cancer; (3) Current smoker or have quit smoking within the last 15 years; (4) You have a tobacco smoking history of at least 20 pack years (packs per day multiplied by number of years you smoked); (5) You get a written order from a healthcare provider  3  Glaucoma Screening: covered annually if you're considered high risk: (1) You have diabetes OR (2) Family history of glaucoma OR (3)  aged 48 and older OR (3)  American aged 72 and older  3  Osteoporosis Screening: covered every 2 years if you meet one of the following conditions: (1) You're estrogen deficient and at risk for osteoporosis based off medical history and other findings; (2) Have a vertebral abnormality; (3) On glucocorticoid therapy for more than 3 months; (4) Have primary hyperparathyroidism; (5) On osteoporosis medications and need to assess response to drug therapy  · Last bone density test (DXA Scan): 07/18/2022   5  HIV Screening: covered annually if you're between the age of 15-65  Also covered annually if you are younger than 13 and older than 72 with risk factors for HIV infection   For pregnant patients, it is covered up to 3 times per pregnancy  Immunizations:  Immunization Recommendations   Influenza Vaccine Annual influenza vaccination during flu season is recommended for all persons aged >= 6 months who do not have contraindications   Pneumococcal Vaccine   * Pneumococcal conjugate vaccine = PCV13 (Prevnar 13), PCV15 (Vaxneuvance), PCV20 (Prevnar 20)  * Pneumococcal polysaccharide vaccine = PPSV23 (Pneumovax) Adults 25-60 years old: 1-3 doses may be recommended based on certain risk factors  Adults 72 years old: 1-2 doses may be recommended based off what pneumonia vaccine you previously received   Hepatitis B Vaccine 3 dose series if at intermediate or high risk (ex: diabetes, end stage renal disease, liver disease)   Tetanus (Td) Vaccine - COST NOT COVERED BY MEDICARE PART B Following completion of primary series, a booster dose should be given every 10 years to maintain immunity against tetanus  Td may also be given as tetanus wound prophylaxis  Tdap Vaccine - COST NOT COVERED BY MEDICARE PART B Recommended at least once for all adults  For pregnant patients, recommended with each pregnancy  Shingles Vaccine (Shingrix) - COST NOT COVERED BY MEDICARE PART B  2 shot series recommended in those aged 48 and above     Health Maintenance Due:      Topic Date Due   • Hepatitis C Screening  Never done   • Breast Cancer Screening: Mammogram  09/02/2022   • Colorectal Cancer Screening  11/12/2023     Immunizations Due:      Topic Date Due   • Pneumococcal Vaccine: 65+ Years (1 - PCV) Never done   • COVID-19 Vaccine (4 - Booster for Moderna series) 03/02/2022   • Influenza Vaccine (1) 09/01/2022     Advance Directives   What are advance directives? Advance directives are legal documents that state your wishes and plans for medical care  These plans are made ahead of time in case you lose your ability to make decisions for yourself   Advance directives can apply to any medical decision, such as the treatments you want, and if you want to donate organs  What are the types of advance directives? There are many types of advance directives, and each state has rules about how to use them  You may choose a combination of any of the following:  · Living will: This is a written record of the treatment you want  You can also choose which treatments you do not want, which to limit, and which to stop at a certain time  This includes surgery, medicine, IV fluid, and tube feedings  · Durable power of  for healthcare [facility]): This is a written record that states who you want to make healthcare choices for you when you are unable to make them for yourself  This person, called a proxy, is usually a family member or a friend  You may choose more than 1 proxy  · Do not resuscitate (DNR) order:  A DNR order is used in case your heart stops beating or you stop breathing  It is a request not to have certain forms of treatment, such as CPR  A DNR order may be included in other types of advance directives  · Medical directive: This covers the care that you want if you are in a coma, near death, or unable to make decisions for yourself  You can list the treatments you want for each condition  Treatment may include pain medicine, surgery, blood transfusions, dialysis, IV or tube feedings, and a ventilator (breathing machine)  · Values history: This document has questions about your views, beliefs, and how you feel and think about life  This information can help others choose the care that you would choose  Why are advance directives important? An advance directive helps you control your care  Although spoken wishes may be used, it is better to have your wishes written down  Spoken wishes can be misunderstood, or not followed  Treatments may be given even if you do not want them  An advance directive may make it easier for your family to make difficult choices about your care     Urinary Incontinence   Urinary incontinence (UI)  is when you lose control of your bladder  UI develops because your bladder cannot store or empty urine properly  The 3 most common types of UI are stress incontinence, urge incontinence, or both  Medicines:   · May be given to help strengthen your bladder control  Report any side effects of medication to your healthcare provider  Do pelvic muscle exercises often:  Your pelvic muscles help you stop urinating  Squeeze these muscles tight for 5 seconds, then relax for 5 seconds  Gradually work up to squeezing for 10 seconds  Do 3 sets of 15 repetitions a day, or as directed  This will help strengthen your pelvic muscles and improve bladder control  Train your bladder:  Go to the bathroom at set times, such as every 2 hours, even if you do not feel the urge to go  You can also try to hold your urine when you feel the urge to go  For example, hold your urine for 5 minutes when you feel the urge to go  As that becomes easier, hold your urine for 10 minutes  Self-care:   · Keep a UI record  Write down how often you leak urine and how much you leak  Make a note of what you were doing when you leaked urine  · Drink liquids as directed  You may need to limit the amount of liquid you drink to help control your urine leakage  Do not drink any liquid right before you go to bed  Limit or do not have drinks that contain caffeine or alcohol  · Prevent constipation  Eat a variety of high-fiber foods  Good examples are high-fiber cereals, beans, vegetables, and whole-grain breads  Walking is the best way to trigger your intestines to have a bowel movement  · Exercise regularly and maintain a healthy weight  Weight loss and exercise will decrease pressure on your bladder and help you control your leakage  · Use a catheter as directed  to help empty your bladder  A catheter is a tiny, plastic tube that is put into your bladder to drain your urine  · Go to behavior therapy as directed    Behavior therapy may be used to help you learn to control your urge to urinate  Weight Management   Why it is important to manage your weight:  Being overweight increases your risk of health conditions such as heart disease, high blood pressure, type 2 diabetes, and certain types of cancer  It can also increase your risk for osteoarthritis, sleep apnea, and other respiratory problems  Aim for a slow, steady weight loss  Even a small amount of weight loss can lower your risk of health problems  How to lose weight safely:  A safe and healthy way to lose weight is to eat fewer calories and get regular exercise  You can lose up about 1 pound a week by decreasing the number of calories you eat by 500 calories each day  Healthy meal plan for weight management:  A healthy meal plan includes a variety of foods, contains fewer calories, and helps you stay healthy  A healthy meal plan includes the following:  · Eat whole-grain foods more often  A healthy meal plan should contain fiber  Fiber is the part of grains, fruits, and vegetables that is not broken down by your body  Whole-grain foods are healthy and provide extra fiber in your diet  Some examples of whole-grain foods are whole-wheat breads and pastas, oatmeal, brown rice, and bulgur  · Eat a variety of vegetables every day  Include dark, leafy greens such as spinach, kale, lucia greens, and mustard greens  Eat yellow and orange vegetables such as carrots, sweet potatoes, and winter squash  · Eat a variety of fruits every day  Choose fresh or canned fruit (canned in its own juice or light syrup) instead of juice  Fruit juice has very little or no fiber  · Eat low-fat dairy foods  Drink fat-free (skim) milk or 1% milk  Eat fat-free yogurt and low-fat cottage cheese  Try low-fat cheeses such as mozzarella and other reduced-fat cheeses  · Choose meat and other protein foods that are low in fat  Choose beans or other legumes such as split peas or lentils   Choose fish, skinless poultry (chicken or turkey), or lean cuts of red meat (beef or pork)  Before you cook meat or poultry, cut off any visible fat  · Use less fat and oil  Try baking foods instead of frying them  Add less fat, such as margarine, sour cream, regular salad dressing and mayonnaise to foods  Eat fewer high-fat foods  Some examples of high-fat foods include french fries, doughnuts, ice cream, and cakes  · Eat fewer sweets  Limit foods and drinks that are high in sugar  This includes candy, cookies, regular soda, and sweetened drinks  Exercise:  Exercise at least 30 minutes per day on most days of the week  Some examples of exercise include walking, biking, dancing, and swimming  You can also fit in more physical activity by taking the stairs instead of the elevator or parking farther away from stores  Ask your healthcare provider about the best exercise plan for you  © Copyright [employer] 2018 Information is for End User's use only and may not be sold, redistributed or otherwise used for commercial purposes   All illustrations and images included in CareNotes® are the copyrighted property of [employer]  or [address] [place] [address]

## 2022-11-02 ENCOUNTER — OFFICE VISIT (OUTPATIENT)
Dept: PODIATRY | Facility: CLINIC | Age: 67
End: 2022-11-02

## 2022-11-02 VITALS
HEART RATE: 83 BPM | DIASTOLIC BLOOD PRESSURE: 76 MMHG | WEIGHT: 150 LBS | OXYGEN SATURATION: 100 % | SYSTOLIC BLOOD PRESSURE: 120 MMHG | HEIGHT: 61 IN | BODY MASS INDEX: 28.32 KG/M2

## 2022-11-02 DIAGNOSIS — M79.672 LEFT FOOT PAIN: ICD-10-CM

## 2022-11-02 DIAGNOSIS — B35.1 ONYCHOMYCOSIS: ICD-10-CM

## 2022-11-02 DIAGNOSIS — B07.0 VERRUCA PLANTARIS: Primary | ICD-10-CM

## 2022-11-02 RX ORDER — KETOCONAZOLE 20 MG/G
CREAM TOPICAL DAILY
Qty: 30 G | Refills: 3 | Status: SHIPPED | OUTPATIENT
Start: 2022-11-02

## 2022-11-02 NOTE — PROGRESS NOTES
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  She is gone through several courses of terbinafine orally the last being about 8 years ago  She is not had any resolution with oral antifungal therapy thus far  A specimen was collected from the right hallucal nail plate for pathologic testing  Consider other oral antifungals depending on culture results  In the interim, I will start her on topical ketoconazole cream to be applied to the affected toenails at bedtime  Recommend follow-up in 3-4 weeks for the left heel of verruca  Nail pathology results will likely not be ready at the time of her next visit  Diagnoses and all orders for this visit:    Verruca plantaris  -     Lesion Destruction    Left foot pain  -     Ambulatory Referral to Podiatry    Onychomycosis  -     ketoconazole (NIZORAL) 2 % cream; Apply topically daily          Subjective:      Patient ID: Payal Lr is a 69 y o  female  The patient presents today with a chief complaint of a tender callus lesion to the plantar aspect of her lateral left heel  She states that she is noticed it for several months but is becoming increasingly painful  She can not recall any injury or trauma to the left heel  She feels that her orthotics may be contributing to irritation and rubbing that area of her heel  She also notes chronic mycotic changes to her pedal nail plates that has failed multiple rounds of oral terbinafine therapy in the past   She is inquiring as to what else can be done regarding her pedal nails        The following portions of the patient's history were reviewed and updated as appropriate: allergies, current medications, past family history, past medical history, past social history, past surgical history and problem list       PAST MEDICAL HISTORY:  Past Medical History:   Diagnosis Date   • Allergic 2010    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 take Meloxicam, Glucosamine & Chondroitin   • Hypertension ?     take hydrochlorothiazide, Valsartan & Atorvastatin   • Urinary tract infection 1975    Occasional bladder infections throughout adulthood       PAST SURGICAL HISTORY:  Past Surgical History:   Procedure Laterality Date   •  SECTION  1980   • EYE SURGERY  2020    cataract surgery R eye   • HYSTERECTOMY  2000   • OOPHORECTOMY     • PAROTIDECTOMY          ALLERGIES:  Nitrofurantoin, Shrimp extract allergy skin test - food allergy, Pneumococcal vaccine, Shellfish allergy - food allergy, and Sulfa antibiotics    MEDICATIONS:  Current Outpatient Medications   Medication Sig Dispense Refill   • ascorbic acid (VITAMIN C) 500 MG tablet Take 500 mg by mouth     • atorvastatin (LIPITOR) 10 mg tablet Take 1 tablet (10 mg total) by mouth daily 90 tablet 1   • cholecalciferol (VITAMIN D3) 400 units tablet Take 400 Units by mouth     • clobetasol (TEMOVATE) 0 05 % ointment Apply topically 2 (two) times a day 45 g 0   • Echinacea 125 MG CAPS Take by mouth     • glucosamine-chondroitin 500-400 MG tablet Take 2 tablets by mouth      • hydrochlorothiazide (HYDRODIURIL) 25 mg tablet Take 1 tablet (25 mg total) by mouth daily 90 tablet 1   • ketoconazole (NIZORAL) 2 % cream Apply topically daily 30 g 3   • meloxicam (MOBIC) 7 5 mg tablet Take 1 tablet (7 5 mg total) by mouth daily 90 tablet 0   • Multiple Vitamins-Minerals (MULTIVITAMIN ADULT EXTRA C PO) Take 1 tablet by mouth     • valsartan (DIOVAN) 160 mg tablet Take 1 tablet (160 mg total) by mouth daily 90 tablet 1     No current facility-administered medications for this visit  SOCIAL HISTORY:  Social History     Socioeconomic History   • Marital status: /Civil Union     Spouse name: None   • Number of children: None   • Years of education: None   • Highest education level: None   Occupational History   • None   Tobacco Use   • Smoking status: Never Smoker   • Smokeless tobacco: Never Used   Vaping Use   • Vaping Use: Never used   Substance and Sexual Activity   • Alcohol use: Yes     Alcohol/week: 1 0 standard drink     Types: 1 Glasses of wine per week     Comment: Very limited  Less than 1 per week  • Drug use: Never   • Sexual activity: Not Currently     Partners: Male     Birth control/protection: Post-menopausal, None   Other Topics Concern   • None   Social History Narrative   • None     Social Determinants of Health     Financial Resource Strain: Unknown   • Difficulty of Paying Living Expenses: Patient refused   Food Insecurity: Not on file   Transportation Needs: Unknown   • Lack of Transportation (Medical): Patient refused   • Lack of Transportation (Non-Medical): Patient refused   Physical Activity: Not on file   Stress: Not on file   Social Connections: Not on file   Intimate Partner Violence: Not on file   Housing Stability: Not on file        Review of Systems   Constitutional: Negative for chills and fever  HENT: Negative for ear pain and sore throat  Eyes: Negative for pain and visual disturbance  Respiratory: Negative for cough and shortness of breath  Cardiovascular: Negative for chest pain and palpitations  Gastrointestinal: Negative for abdominal pain and vomiting  Genitourinary: Negative for dysuria and hematuria  Musculoskeletal: Negative for arthralgias and back pain  Skin: Negative for color change and rash  Neurological: Negative for seizures and syncope  Psychiatric/Behavioral: Negative  All other systems reviewed and are negative          Objective:      /76   Pulse 83    5' 1" (1 549 m)   Wt 68 kg (150 lb)   SpO2 100%   BMI 28 34 kg/m²          Physical Exam  Constitutional:       Appearance: Normal appearance  HENT:      Head: Normocephalic and atraumatic  Nose: Nose normal    Cardiovascular:      Pulses:           Dorsalis pedis pulses are 2+ on the right side and 2+ on the left side  Posterior tibial pulses are 2+ on the right side and 2+ on the left side  Pulmonary:      Effort: Pulmonary effort is normal    Musculoskeletal:        Feet:    Feet:      Right foot:      Toenail Condition: Right toenails are abnormally thick  Fungal disease present  Left foot:      Toenail Condition: Left toenails are abnormally thick  Fungal disease present  Comments: There is a well demarcated callused lesion to the plantar lateral aspect of the patient's left heel loss of skin lines and tenderness with palpation and lateral squeeze, consistent with plantar verruca    The pedal nail plates on the right foot are clinically mycotic as well as the left hallucal nail plate, the left 2nd through 5th toenails appear to be spared      Skin:     General: Skin is warm  Capillary Refill: Capillary refill takes less than 2 seconds  Neurological:      General: No focal deficit present  Mental Status: She is alert and oriented to person, place, and time  Psychiatric:         Mood and Affect: Mood normal          Behavior: Behavior normal          Thought Content: Thought content normal            Lesion Destruction    Date/Time: 11/2/2022 11:20 AM  Performed by: Silviano Rodriguez DPM  Authorized by: Silviano Rodriguez DPM   Universal Protocol:  Consent: Verbal consent obtained  Risks and benefits: risks, benefits and alternatives were discussed  Consent given by: patient  Time out: Immediately prior to procedure a "time out" was called to verify the correct patient, procedure, equipment, support staff and site/side marked as required    Timeout called at: 11/2/2022 11:20 AM   Patient understanding: patient states understanding of the procedure being performed  Patient consent: the patient's understanding of the procedure matches consent given  Patient identity confirmed: verbally with patient and provided demographic data      Procedure Details - Lesion Destruction:     Number of Lesions:  1  Lesion 1:     Body area:  Lower extremity    Lower extremity location:  L foot    Initial size (mm):  8    Final defect size (mm):  8    Malignancy: benign lesion      Destruction method: chemical removal       The plantar left foot verruca was sharply debrided to patient tolerance with a sterile #15 blade  Cantherone was applied and covered with a DSD  Maintain for 48 hours  F/U in 3-4 weeks

## 2022-11-10 DIAGNOSIS — B35.1 ONYCHOMYCOSIS: Primary | ICD-10-CM

## 2022-11-10 DIAGNOSIS — E78.2 MIXED HYPERLIPIDEMIA: ICD-10-CM

## 2022-11-11 LAB — KOH PREP SPEC: NORMAL

## 2022-11-14 LAB — FUNGUS SPEC CULT: NORMAL

## 2022-11-21 LAB — FUNGUS SPEC CULT: NORMAL

## 2022-11-28 LAB — FUNGUS SPEC CULT: NORMAL

## 2022-11-30 ENCOUNTER — OFFICE VISIT (OUTPATIENT)
Dept: PODIATRY | Facility: CLINIC | Age: 67
End: 2022-11-30

## 2022-11-30 VITALS
WEIGHT: 150 LBS | HEART RATE: 77 BPM | HEIGHT: 61 IN | BODY MASS INDEX: 28.32 KG/M2 | SYSTOLIC BLOOD PRESSURE: 153 MMHG | OXYGEN SATURATION: 99 % | DIASTOLIC BLOOD PRESSURE: 98 MMHG

## 2022-11-30 DIAGNOSIS — B07.0 PLANTAR WARTS: Primary | ICD-10-CM

## 2022-11-30 DIAGNOSIS — L30.9 ECZEMA, UNSPECIFIED TYPE: ICD-10-CM

## 2022-11-30 RX ORDER — CLOBETASOL PROPIONATE 0.5 MG/G
OINTMENT TOPICAL 2 TIMES DAILY
Qty: 45 G | Refills: 3 | Status: SHIPPED | OUTPATIENT
Start: 2022-11-30

## 2022-11-30 NOTE — PROGRESS NOTES
Assessment/Plan:     The patient's clinical examination today is consistent with persistent plantar verruca to the plantar lateral aspect of the left heel  It did seem to respond well to topical Cantharone at the last visit and this treatment was repeated today  A dry sterile dressing was reapplied and to be maintained for 48 hours  Fungal cultures were reviewed with the patient and they are ultimately still pending; KOH prep was negative for fungal elements  The patient does have several areas of dry patchy skin noted to the foot bilaterally as well as several of her fingers  May need to consider psoriatic cause for the nail changes  I will start her on a topical steroid which she had previously used for her skin rashes  Consider referral to Dermatology for further evaluation to rule in or out psoriasis  Recommend follow-up in 3-4 weeks  Diagnoses and all orders for this visit:    Eczema, unspecified type  -     clobetasol (TEMOVATE) 0 05 % ointment; Apply topically 2 (two) times a day          Subjective:     Patient ID: Paul Saldana is a 69 y o  female  The patient presents today for follow-up of a left foot plantar wart  The wart is doing fairly well with minimal discomfort and pain  She does note areas of dry scaling skin to the lateral aspect of her left foot as well as the top of her left great toe joint  She has similar patch of dry skin on several of her fingers as well  She states that in the past this was treated well with clobetasol ointment  Review of Systems   Constitutional: Negative for chills and fever  HENT: Negative for ear pain and sore throat  Eyes: Negative for pain and visual disturbance  Respiratory: Negative for cough and shortness of breath  Cardiovascular: Negative for chest pain and palpitations  Gastrointestinal: Negative for abdominal pain and vomiting  Genitourinary: Negative for dysuria and hematuria     Musculoskeletal: Negative for arthralgias and back pain  Skin: Negative for color change and rash  Neurological: Negative for seizures and syncope  Psychiatric/Behavioral: Negative  All other systems reviewed and are negative  Objective:     Physical Exam  Constitutional:       Appearance: Normal appearance  HENT:      Head: Normocephalic and atraumatic  Nose: Nose normal    Cardiovascular:      Pulses:           Dorsalis pedis pulses are 2+ on the left side  Posterior tibial pulses are 2+ on the left side  Pulmonary:      Effort: Pulmonary effort is normal    Musculoskeletal:        Feet:    Feet:      Comments: There is a plantar verruca to the plantar lateral aspect of the left heel with minimal tenderness to palpation    There are dry areas of patchy skin with minimal pruritus to the lateral aspect of the left midfoot as well as the dorsal aspect of the left great toe joint  Skin:     General: Skin is warm  Capillary Refill: Capillary refill takes less than 2 seconds  Neurological:      General: No focal deficit present  Mental Status: She is alert and oriented to person, place, and time  Psychiatric:         Mood and Affect: Mood normal          Behavior: Behavior normal          Thought Content: Thought content normal            Lesion Destruction    Date/Time: 11/30/2022 11:25 AM  Performed by: Estefany Levi DPM  Authorized by: Estefany Levi DPM   Universal Protocol:  Consent: Verbal consent obtained  Risks and benefits: risks, benefits and alternatives were discussed  Consent given by: patient  Time out: Immediately prior to procedure a "time out" was called to verify the correct patient, procedure, equipment, support staff and site/side marked as required    Timeout called at: 11/30/2022 11:25 AM   Patient understanding: patient states understanding of the procedure being performed  Patient consent: the patient's understanding of the procedure matches consent given  Patient identity confirmed: verbally with patient and provided demographic data      Procedure Details - Lesion Destruction:     Number of Lesions:  1  Lesion 1:     Body area:  Lower extremity    Lower extremity location:  L foot    Initial size (mm):  7    Final defect size (mm):  7    Malignancy: benign lesion      Destruction method: chemical removal       The plantar left heel verruca was sharply debrided to pinpoint bleeding and patient tolerance  Cantherone was applied and covered with a DSD  Maintain for 48 hours  F/U in 3 weeks

## 2022-12-05 LAB — FUNGUS SPEC CULT: NORMAL

## 2022-12-12 LAB — FUNGUS SPEC CULT: NORMAL

## 2022-12-20 ENCOUNTER — HOSPITAL ENCOUNTER (OUTPATIENT)
Dept: RADIOLOGY | Age: 67
Discharge: HOME | End: 2022-12-20

## 2022-12-20 VITALS — BODY MASS INDEX: 28.32 KG/M2 | HEIGHT: 61 IN | WEIGHT: 150 LBS

## 2022-12-20 DIAGNOSIS — Z12.31 VISIT FOR SCREENING MAMMOGRAM: ICD-10-CM

## 2022-12-27 ENCOUNTER — TELEPHONE (OUTPATIENT)
Dept: FAMILY MEDICINE CLINIC | Facility: CLINIC | Age: 67
End: 2022-12-27

## 2022-12-27 NOTE — TELEPHONE ENCOUNTER
----- Message from Karla Mendes MD sent at 12/27/2022  8:48 AM EST -----  Please call patient with normal results

## 2023-01-08 DIAGNOSIS — M25.511 CHRONIC RIGHT SHOULDER PAIN: ICD-10-CM

## 2023-01-08 DIAGNOSIS — G89.29 CHRONIC RIGHT SHOULDER PAIN: ICD-10-CM

## 2023-01-08 DIAGNOSIS — M15.9 PRIMARY OSTEOARTHRITIS INVOLVING MULTIPLE JOINTS: ICD-10-CM

## 2023-01-09 RX ORDER — MELOXICAM 7.5 MG/1
TABLET ORAL
Qty: 90 TABLET | Refills: 0 | Status: SHIPPED | OUTPATIENT
Start: 2023-01-09

## 2023-01-11 ENCOUNTER — OFFICE VISIT (OUTPATIENT)
Dept: PODIATRY | Facility: CLINIC | Age: 68
End: 2023-01-11

## 2023-01-11 VITALS
HEIGHT: 61 IN | WEIGHT: 150 LBS | DIASTOLIC BLOOD PRESSURE: 107 MMHG | OXYGEN SATURATION: 99 % | SYSTOLIC BLOOD PRESSURE: 168 MMHG | HEART RATE: 89 BPM | BODY MASS INDEX: 28.32 KG/M2

## 2023-01-11 DIAGNOSIS — L40.9 PSORIASIS OF NAIL: ICD-10-CM

## 2023-01-11 DIAGNOSIS — B07.0 PLANTAR WARTS: Primary | ICD-10-CM

## 2023-01-11 NOTE — PROGRESS NOTES
Assessment/Plan: On clinical examination today, there is an improving plantar verruca to the plantar lateral aspect of the patient's left lateral heel  She did have an aggressive response from Cantharone at her last visit  I will hold off on further Cantharone therapy for now  The verrucous lesion was debrided with a sterile 15 blade and the lesion was then treated topically with silver nitrate  Her final fungal cultures and KOH prep were both negative  At this point in time, I believe her nail changes are most likely secondary to her psoriasis  She may consider use of topical urea nail gel as to improve the appearance of the nail but there is no other option for clinical cure for her nail changes  Her skin does seem to be responding well to clobetasol and she may continue use of it on an as-needed basis  Diagnoses and all orders for this visit:    Plantar warts    Psoriasis of nail          Subjective:     Patient ID: Jerome Griffith is a 69 y o  female  The patient presents today for follow-up of a lateral left heel verruca as well as nail changes concerning for onychomycosis  The psoriatic rashes to her feet have improved with topical clobetasol  She did note a severe reaction with Cantharone treatment after her last visit  She stated that she had severe pain for about 3 to 5 days afterwards which made it difficult for her to walk or ambulate  Review of Systems   Constitutional: Negative  HENT: Negative  Eyes: Negative  Respiratory: Negative  Cardiovascular: Negative  Endocrine: Negative  Musculoskeletal: Negative  Neurological: Negative  Hematological: Negative  Psychiatric/Behavioral: Negative  Objective:     Physical Exam  Constitutional:       Appearance: Normal appearance  HENT:      Head: Normocephalic and atraumatic        Nose: Nose normal    Cardiovascular:      Pulses:           Dorsalis pedis pulses are 2+ on the right side and 2+ on the left side  Posterior tibial pulses are 2+ on the right side and 2+ on the left side  Pulmonary:      Effort: Pulmonary effort is normal    Feet:      Comments: The plantar verruca to the lateral aspect of her left heel is much improved and appears smaller in size and depth; the lesion was debrided today with a sterile 15 blade and treated topically with silver nitrate    Nail changes to the pedal nails is likely attributed to psoriasis as her fungal culture and KOH prep were both negative    Psoriatic macules to the feet are much improved with topical clobetasol therapy  Skin:     General: Skin is warm  Capillary Refill: Capillary refill takes less than 2 seconds  Neurological:      General: No focal deficit present  Mental Status: She is alert and oriented to person, place, and time  Psychiatric:         Mood and Affect: Mood normal          Behavior: Behavior normal          Thought Content:  Thought content normal

## 2023-02-08 ENCOUNTER — OFFICE VISIT (OUTPATIENT)
Dept: PODIATRY | Facility: CLINIC | Age: 68
End: 2023-02-08

## 2023-02-08 VITALS
HEIGHT: 61 IN | SYSTOLIC BLOOD PRESSURE: 141 MMHG | OXYGEN SATURATION: 98 % | BODY MASS INDEX: 28.74 KG/M2 | WEIGHT: 152.2 LBS | HEART RATE: 83 BPM | DIASTOLIC BLOOD PRESSURE: 94 MMHG

## 2023-02-08 DIAGNOSIS — B07.0 PLANTAR WARTS: Primary | ICD-10-CM

## 2023-02-08 NOTE — PROGRESS NOTES
Assessment/Plan:     The patient's clinical examination today is consistent with a persistent plantar verruca to the lateral aspect of the left heel  The verrucous lesion was sharply debrided with a 15 blade without incident  The lesion was treated topically with silver nitrate  She does not wish to proceed with Cantharone therapy at this point in time  I did recommend application of medicated discs containing salicylic acid on an outpatient basis  Did recommend routine follow-up in 3 to 4 weeks for continued debridement of the verrucous lesion  The patient was agreeable to this plan  Diagnoses and all orders for this visit:    Plantar warts          Subjective:     Patient ID: Nuzhat Gaitan is a 69 y o  female  Patient presents today for follow-up of a plantar left heel verruca  She notes some discomfort to the plantar lateral aspect of the left heel  Currently, she has been tolerate Cantharone therapy  She denies any other acute pedal issues  Review of Systems   Constitutional: Negative  HENT: Negative  Eyes: Negative  Respiratory: Negative  Cardiovascular: Negative  Endocrine: Negative  Musculoskeletal: Negative  Neurological: Negative  Hematological: Negative  Psychiatric/Behavioral: Negative  Objective:     Physical Exam  Constitutional:       Appearance: Normal appearance  HENT:      Head: Normocephalic and atraumatic  Nose: Nose normal    Cardiovascular:      Pulses:           Dorsalis pedis pulses are 2+ on the left side  Posterior tibial pulses are 2+ on the left side  Pulmonary:      Effort: Pulmonary effort is normal    Musculoskeletal:        Feet:    Feet:      Comments: Is a persistent verrucous lesion to the plantar lateral aspect patient's left heel with mild tenderness palpation lateral squeeze; patient does appear smaller and with less depth since we initiated therapy  Skin:     General: Skin is warm        Capillary Refill: Capillary refill takes less than 2 seconds  Neurological:      General: No focal deficit present  Mental Status: She is alert and oriented to person, place, and time  Psychiatric:         Mood and Affect: Mood normal          Behavior: Behavior normal          Thought Content:  Thought content normal

## 2023-02-28 ENCOUNTER — OFFICE VISIT (OUTPATIENT)
Dept: PODIATRY | Facility: CLINIC | Age: 68
End: 2023-02-28

## 2023-02-28 VITALS
SYSTOLIC BLOOD PRESSURE: 114 MMHG | DIASTOLIC BLOOD PRESSURE: 85 MMHG | BODY MASS INDEX: 27.94 KG/M2 | HEART RATE: 93 BPM | HEIGHT: 61 IN | OXYGEN SATURATION: 100 % | WEIGHT: 148 LBS

## 2023-02-28 DIAGNOSIS — B07.0 PLANTAR WARTS: Primary | ICD-10-CM

## 2023-02-28 NOTE — PROGRESS NOTES
Assessment/Plan:     The patient's clinical examination today is consistent with a resolving plantar verruca to the plantar lateral aspect of the left heel  The verrucous lesion was debrided with a sterile 15 blade to pinpoint bleeding and silver nitrate was applied  The patient will continue OTC therapy with salicylic acid on a daily basis  I recommended that the patient leave the lesion dry for the next 3 days before resuming topical application and to cease use of the product about a week prior to her next visit with the purpose of reducing the amount of macerated tissue  Recommend follow-up in 3 to 4 weeks  There are no diagnoses linked to this encounter  Subjective:     Patient ID: Jenny Felix is a 69 y o  female  The patient presents today for follow-up of a plantar left heel verruca  She has been using salicylic acid on the left heel wart as instructed  She denies any pain or discomfort to the left foot  PAST MEDICAL HISTORY:  Past Medical History:   Diagnosis Date   • Allergic     Hive reaction to Macrodantin,  - reaction to shrimp   • Arthritis ? take Meloxicam, Glucosamine & Chondroitin   • Hypertension ?     take hydrochlorothiazide, Valsartan & Atorvastatin   • Urinary tract infection 1975    Occasional bladder infections throughout adulthood       PAST SURGICAL HISTORY:  Past Surgical History:   Procedure Laterality Date   •  SECTION  1980   • EYE SURGERY  2020    cataract surgery R eye   • HYSTERECTOMY  2000   • OOPHORECTOMY     • PAROTIDECTOMY          ALLERGIES:  Nitrofurantoin, Shrimp extract allergy skin test - food allergy, Pneumococcal vaccine, Shellfish allergy - food allergy, and Sulfa antibiotics    MEDICATIONS:  Current Outpatient Medications   Medication Sig Dispense Refill   • ascorbic acid (VITAMIN C) 500 MG tablet Take 500 mg by mouth     • atorvastatin (LIPITOR) 10 mg tablet Take 1 tablet (10 mg total) by mouth daily 90 tablet 1   • cholecalciferol (VITAMIN D3) 400 units tablet Take 400 Units by mouth     • clobetasol (TEMOVATE) 0 05 % ointment Apply topically 2 (two) times a day 45 g 3   • Echinacea 125 MG CAPS Take by mouth     • glucosamine-chondroitin 500-400 MG tablet Take 2 tablets by mouth      • hydrochlorothiazide (HYDRODIURIL) 25 mg tablet Take 1 tablet (25 mg total) by mouth daily 90 tablet 1   • ketoconazole (NIZORAL) 2 % cream Apply topically daily 30 g 3   • meloxicam (MOBIC) 7 5 mg tablet TAKE 1 TABLET BY MOUTH EVERY DAY 90 tablet 0   • Multiple Vitamins-Minerals (MULTIVITAMIN ADULT EXTRA C PO) Take 1 tablet by mouth     • valsartan (DIOVAN) 160 mg tablet Take 1 tablet (160 mg total) by mouth daily 90 tablet 1     No current facility-administered medications for this visit  SOCIAL HISTORY:  Social History     Socioeconomic History   • Marital status: /Civil Union     Spouse name: None   • Number of children: None   • Years of education: None   • Highest education level: None   Occupational History   • None   Tobacco Use   • Smoking status: Never   • Smokeless tobacco: Never   Vaping Use   • Vaping Use: Never used   Substance and Sexual Activity   • Alcohol use: Yes     Alcohol/week: 1 0 standard drink     Types: 1 Glasses of wine per week     Comment: Very limited  Less than 1 per week     • Drug use: Never   • Sexual activity: Not Currently     Partners: Male     Birth control/protection: Post-menopausal, None   Other Topics Concern   • None   Social History Narrative   • None     Social Determinants of Health     Financial Resource Strain: Unknown   • Difficulty of Paying Living Expenses: Patient refused   Food Insecurity: Not on file   Transportation Needs: Unknown   • Lack of Transportation (Medical): Patient refused   • Lack of Transportation (Non-Medical): Patient refused   Physical Activity: Not on file   Stress: Not on file   Social Connections: Not on file   Intimate Partner Violence: Not on file Housing Stability: Not on file        Review of Systems   Constitutional: Negative  HENT: Negative  Eyes: Negative  Respiratory: Negative  Cardiovascular: Negative  Endocrine: Negative  Musculoskeletal: Negative  Neurological: Negative  Hematological: Negative  Psychiatric/Behavioral: Negative  Objective:     Physical Exam  Constitutional:       Appearance: Normal appearance  HENT:      Head: Normocephalic and atraumatic  Nose: Nose normal    Cardiovascular:      Pulses:           Dorsalis pedis pulses are 2+ on the left side  Posterior tibial pulses are 2+ on the left side  Pulmonary:      Effort: Pulmonary effort is normal    Musculoskeletal:        Feet:    Feet:      Comments: There is a persistent verruca to the plantar lateral aspect of the patient's left heel with minimal tenderness with palpation lateral squeeze; the lesion does appear to be smaller in size and depth; there is callus formation with some mildly macerated tissue from use of the salicylic acid product  Skin:     General: Skin is warm  Capillary Refill: Capillary refill takes less than 2 seconds  Neurological:      General: No focal deficit present  Mental Status: She is alert and oriented to person, place, and time  Psychiatric:         Mood and Affect: Mood normal          Behavior: Behavior normal          Thought Content:  Thought content normal

## 2023-03-16 DIAGNOSIS — I10 ESSENTIAL HYPERTENSION: ICD-10-CM

## 2023-03-16 DIAGNOSIS — E78.2 MIXED HYPERLIPIDEMIA: ICD-10-CM

## 2023-03-16 RX ORDER — HYDROCHLOROTHIAZIDE 25 MG/1
25 TABLET ORAL DAILY
Qty: 90 TABLET | Refills: 1 | Status: SHIPPED | OUTPATIENT
Start: 2023-03-16

## 2023-03-16 RX ORDER — VALSARTAN 160 MG/1
TABLET ORAL
Qty: 90 TABLET | Refills: 1 | Status: SHIPPED | OUTPATIENT
Start: 2023-03-16

## 2023-03-16 RX ORDER — ATORVASTATIN CALCIUM 10 MG/1
TABLET, FILM COATED ORAL
Qty: 90 TABLET | Refills: 1 | Status: SHIPPED | OUTPATIENT
Start: 2023-03-16

## 2023-03-28 ENCOUNTER — OFFICE VISIT (OUTPATIENT)
Dept: PODIATRY | Facility: CLINIC | Age: 68
End: 2023-03-28

## 2023-03-28 VITALS
HEIGHT: 61 IN | BODY MASS INDEX: 27.56 KG/M2 | WEIGHT: 146 LBS | DIASTOLIC BLOOD PRESSURE: 103 MMHG | HEART RATE: 72 BPM | SYSTOLIC BLOOD PRESSURE: 169 MMHG | OXYGEN SATURATION: 100 %

## 2023-03-28 DIAGNOSIS — B07.0 PLANTAR WARTS: Primary | ICD-10-CM

## 2023-03-28 NOTE — PROGRESS NOTES
Assessment/Plan:     The patient's clinical examination today is consistent with a resolved verrucous lesion to the lateral aspect of the left heel  Continue to monitor for recurrence of the lesion  She will follow-up on as-needed basis  Diagnoses and all orders for this visit:    Plantar warts          Subjective:     Patient ID: Mikayla Perez is a 69 y o  female  The patient presents today for follow-up of a plantar left heel verruca  She notes good interval improvement since her last visit and denies any significant pain to the area of the lesion  Review of Systems   Constitutional: Negative  HENT: Negative  Eyes: Negative  Respiratory: Negative  Cardiovascular: Negative  Endocrine: Negative  Musculoskeletal: Negative  Skin: Negative  Neurological: Negative  Hematological: Negative  Psychiatric/Behavioral: Negative  Objective:     Physical Exam  Constitutional:       Appearance: Normal appearance  HENT:      Head: Normocephalic and atraumatic  Nose: Nose normal    Cardiovascular:      Pulses:           Dorsalis pedis pulses are 2+ on the left side  Posterior tibial pulses are 2+ on the left side  Pulmonary:      Effort: Pulmonary effort is normal    Feet:      Comments: The callus lesion over the area of the left heel plantar verruca was debrided and there were no signs of any residual verruca noted; there was return of skin lines with no tenderness palpation or lateral squeeze  Skin:     General: Skin is warm  Capillary Refill: Capillary refill takes less than 2 seconds  Neurological:      General: No focal deficit present  Mental Status: She is alert and oriented to person, place, and time  Psychiatric:         Mood and Affect: Mood normal          Behavior: Behavior normal          Thought Content:  Thought content normal

## 2023-03-31 ENCOUNTER — APPOINTMENT (OUTPATIENT)
Dept: LAB | Facility: CLINIC | Age: 68
End: 2023-03-31

## 2023-03-31 DIAGNOSIS — R73.03 PREDIABETES: ICD-10-CM

## 2023-03-31 LAB
ALBUMIN SERPL BCP-MCNC: 3.9 G/DL (ref 3.5–5)
ALP SERPL-CCNC: 67 U/L (ref 46–116)
ALT SERPL W P-5'-P-CCNC: 29 U/L (ref 12–78)
ANION GAP SERPL CALCULATED.3IONS-SCNC: 4 MMOL/L (ref 4–13)
AST SERPL W P-5'-P-CCNC: 18 U/L (ref 5–45)
BILIRUB SERPL-MCNC: 0.46 MG/DL (ref 0.2–1)
BUN SERPL-MCNC: 23 MG/DL (ref 5–25)
CALCIUM SERPL-MCNC: 9.7 MG/DL (ref 8.3–10.1)
CHLORIDE SERPL-SCNC: 106 MMOL/L (ref 96–108)
CHOLEST SERPL-MCNC: 184 MG/DL
CO2 SERPL-SCNC: 28 MMOL/L (ref 21–32)
CREAT SERPL-MCNC: 0.8 MG/DL (ref 0.6–1.3)
GFR SERPL CREATININE-BSD FRML MDRD: 75 ML/MIN/1.73SQ M
GLUCOSE P FAST SERPL-MCNC: 91 MG/DL (ref 65–99)
HDLC SERPL-MCNC: 58 MG/DL
LDLC SERPL CALC-MCNC: 96 MG/DL (ref 0–100)
NONHDLC SERPL-MCNC: 126 MG/DL
POTASSIUM SERPL-SCNC: 3.8 MMOL/L (ref 3.5–5.3)
PROT SERPL-MCNC: 7.5 G/DL (ref 6.4–8.4)
SODIUM SERPL-SCNC: 138 MMOL/L (ref 135–147)
TRIGL SERPL-MCNC: 150 MG/DL

## 2023-03-31 PROCEDURE — 80061 LIPID PANEL: CPT

## 2023-04-02 LAB
EST. AVERAGE GLUCOSE BLD GHB EST-MCNC: 120 MG/DL
HBA1C MFR BLD: 5.8 %

## 2023-04-20 PROBLEM — R21 RASH: Status: ACTIVE | Noted: 2023-04-20

## 2023-10-03 DIAGNOSIS — I10 ESSENTIAL HYPERTENSION: ICD-10-CM

## 2023-10-03 DIAGNOSIS — E78.2 MIXED HYPERLIPIDEMIA: ICD-10-CM

## 2023-10-03 RX ORDER — HYDROCHLOROTHIAZIDE 25 MG/1
25 TABLET ORAL DAILY
Qty: 90 TABLET | Refills: 1 | Status: SHIPPED | OUTPATIENT
Start: 2023-10-03

## 2023-10-03 RX ORDER — VALSARTAN 160 MG/1
TABLET ORAL
Qty: 90 TABLET | Refills: 1 | Status: SHIPPED | OUTPATIENT
Start: 2023-10-03

## 2023-10-03 RX ORDER — ATORVASTATIN CALCIUM 10 MG/1
TABLET, FILM COATED ORAL
Qty: 90 TABLET | Refills: 1 | Status: SHIPPED | OUTPATIENT
Start: 2023-10-03

## 2023-10-10 ENCOUNTER — OFFICE VISIT (OUTPATIENT)
Dept: FAMILY MEDICINE CLINIC | Facility: CLINIC | Age: 68
End: 2023-10-10
Payer: COMMERCIAL

## 2023-10-10 VITALS
SYSTOLIC BLOOD PRESSURE: 125 MMHG | HEART RATE: 69 BPM | OXYGEN SATURATION: 99 % | HEIGHT: 61 IN | BODY MASS INDEX: 24.92 KG/M2 | DIASTOLIC BLOOD PRESSURE: 80 MMHG | WEIGHT: 132 LBS

## 2023-10-10 DIAGNOSIS — R94.31 ABNORMAL EKG: ICD-10-CM

## 2023-10-10 DIAGNOSIS — R00.2 PALPITATIONS: Primary | ICD-10-CM

## 2023-10-10 DIAGNOSIS — F41.9 ANXIETY: ICD-10-CM

## 2023-10-10 PROCEDURE — 99214 OFFICE O/P EST MOD 30 MIN: CPT | Performed by: FAMILY MEDICINE

## 2023-10-10 PROCEDURE — 93000 ELECTROCARDIOGRAM COMPLETE: CPT | Performed by: FAMILY MEDICINE

## 2023-10-10 RX ORDER — LORAZEPAM 0.5 MG/1
0.5 TABLET ORAL EVERY 8 HOURS PRN
Qty: 30 TABLET | Refills: 0 | Status: SHIPPED | OUTPATIENT
Start: 2023-10-10 | End: 2023-10-19 | Stop reason: SDUPTHER

## 2023-10-10 NOTE — ASSESSMENT & PLAN NOTE
New onset symptoms, started 2 weeks ago when patient was driving. Patient reports mild tingling in her left hand but patient states that she was driving at the time. Patient is currently asymptomatic. EKG in the office reveals bigeminy. No previous EKG to compare. Patient advised metabolic labs including TSH and magnesium. Recommend cardiology evaluation. ER parameters discussed with patient.

## 2023-10-10 NOTE — ASSESSMENT & PLAN NOTE
Patient reports new onset of recent anxiety, related to various stressors. Different treatment options discussed, started on Ativan 0.5 mg nightly as needed. Patient is due for a routine follow-up 7-10 days.

## 2023-10-10 NOTE — PROGRESS NOTES
Subjective:      Patient ID: Jia Chung is a 77 y.o. female. HPI    Patient is here for symptoms of palpitations that she experienced almost 10 days ago when she was driving to [place]. Patient reports experiencing mild tightness in her chest.  Does mention symptoms of tingling of her fingers. Symptoms resolved spontaneously there after. Patient does mention some recent stress in her life. Denies any anxiety or depression. Patient has history of hypertension/dyslipidemia. Blood pressure and LDL are at goal.  Patient is on atorvastatin 10 mg. Past Medical History:   Diagnosis Date   • Allergic     Hive reaction to Macrodantin,  - reaction to shrimp   • Arthritis ? take Meloxicam, Glucosamine & Chondroitin   • Hypertension ? take hydrochlorothiazide, Valsartan & Atorvastatin   • Urinary tract infection 1975    Occasional bladder infections throughout adulthood       Family History   Problem Relation Age of Onset   • Depression Mother         Bi-polar   • Cancer Mother         Non-small cell lung cancer (non-smoker)   • Bipolar disorder Mother    • Dementia Maternal Grandmother    • Dementia Maternal Uncle    • Hypertension Father    • Heart disease Father    • Hearing loss Father    • Hypertension Brother    • Heart disease Maternal Grandfather    • Diabetes Maternal Grandfather    • Completed Suicide  Maternal Uncle    • No Known Problems Daughter    • No Known Problems Daughter    • No Known Problems Paternal Aunt        Past Surgical History:   Procedure Laterality Date   •  SECTION  1980   • EYE SURGERY  2020    cataract surgery R eye   • HYSTERECTOMY  2000   • OOPHORECTOMY     • PAROTIDECTOMY          reports that she has never smoked. She has never used smokeless tobacco. She reports current alcohol use of about 1.0 standard drink of alcohol per week. She reports that she does not use drugs.       Current Outpatient Medications:   •  ascorbic acid (VITAMIN C) 500 MG tablet, Take 500 mg by mouth, Disp: , Rfl:   •  atorvastatin (LIPITOR) 10 mg tablet, TAKE 1 TABLET BY MOUTH EVERY DAY, Disp: 90 tablet, Rfl: 1  •  cholecalciferol (VITAMIN D3) 400 units tablet, Take 400 Units by mouth, Disp: , Rfl:   •  clobetasol (TEMOVATE) 0.05 % ointment, Apply topically 2 (two) times a day, Disp: 45 g, Rfl: 3  •  Echinacea 125 MG CAPS, Take by mouth, Disp: , Rfl:   •  glucosamine-chondroitin 500-400 MG tablet, Take 2 tablets by mouth , Disp: , Rfl:   •  hydrochlorothiazide (HYDRODIURIL) 25 mg tablet, TAKE 1 TABLET (25 MG TOTAL) BY MOUTH DAILY. , Disp: 90 tablet, Rfl: 1  •  ketoconazole (NIZORAL) 2 % cream, Apply topically daily, Disp: 30 g, Rfl: 3  •  Multiple Vitamins-Minerals (MULTIVITAMIN ADULT EXTRA C PO), Take 1 tablet by mouth, Disp: , Rfl:   •  valsartan (DIOVAN) 160 mg tablet, TAKE 1 TABLET BY MOUTH EVERY DAY, Disp: 90 tablet, Rfl: 1    The following portions of the patient's history were reviewed and updated as appropriate: allergies, current medications, past family history, past medical history, past social history, past surgical history and problem list.    Review of Systems   Constitutional: Negative. Respiratory: Negative. Cardiovascular: Positive for palpitations. Objective:    /80   Pulse 69   Ht 5' 1" (1.549 m)   Wt 59.9 kg (132 lb)   SpO2 99%   BMI 24.94 kg/m²      Physical Exam  Constitutional:       Appearance: Normal appearance. Cardiovascular:      Heart sounds: Normal heart sounds. Pulmonary:      Breath sounds: Normal breath sounds. No results found for this or any previous visit (from the past 1008 hour(s)). Assessment/Plan:    No problem-specific Assessment & Plan notes found for this encounter. Problem List Items Addressed This Visit        Other    Palpitations - Primary     New onset symptoms, started 2 weeks ago when patient was driving.   Patient reports mild tingling in her left hand but patient states that she was driving at the time. Patient is currently asymptomatic. EKG in the office reveals bigeminy. No previous EKG to compare. Patient advised metabolic labs including TSH and magnesium. Recommend cardiology evaluation. ER parameters discussed with patient. Relevant Orders    POCT ECG (Completed)    TSH, 3rd generation with Free T4 reflex    Magnesium    Holter monitor    Ambulatory Referral to Cardiology    Anxiety     Patient reports new onset of recent anxiety, related to various stressors. Different treatment options discussed, started on Ativan 0.5 mg nightly as needed. Patient is due for a routine follow-up 7-10 days.           Relevant Medications    LORazepam (Ativan) 0.5 mg tablet   Other Visit Diagnoses     Abnormal EKG        Relevant Orders    Ambulatory Referral to Cardiology

## 2023-10-12 ENCOUNTER — APPOINTMENT (OUTPATIENT)
Dept: LAB | Facility: CLINIC | Age: 68
End: 2023-10-12
Payer: COMMERCIAL

## 2023-10-12 DIAGNOSIS — R00.2 PALPITATIONS: ICD-10-CM

## 2023-10-12 DIAGNOSIS — E78.2 MIXED HYPERLIPIDEMIA: ICD-10-CM

## 2023-10-12 DIAGNOSIS — R73.03 PREDIABETES: ICD-10-CM

## 2023-10-12 LAB
ALBUMIN SERPL BCP-MCNC: 4.6 G/DL (ref 3.5–5)
ALP SERPL-CCNC: 53 U/L (ref 34–104)
ALT SERPL W P-5'-P-CCNC: 16 U/L (ref 7–52)
ANION GAP SERPL CALCULATED.3IONS-SCNC: 11 MMOL/L
AST SERPL W P-5'-P-CCNC: 21 U/L (ref 13–39)
BILIRUB SERPL-MCNC: 0.45 MG/DL (ref 0.2–1)
BUN SERPL-MCNC: 18 MG/DL (ref 5–25)
CALCIUM SERPL-MCNC: 10.4 MG/DL (ref 8.4–10.2)
CHLORIDE SERPL-SCNC: 101 MMOL/L (ref 96–108)
CHOLEST SERPL-MCNC: 198 MG/DL
CO2 SERPL-SCNC: 27 MMOL/L (ref 21–32)
CREAT SERPL-MCNC: 0.78 MG/DL (ref 0.6–1.3)
EST. AVERAGE GLUCOSE BLD GHB EST-MCNC: 123 MG/DL
GFR SERPL CREATININE-BSD FRML MDRD: 77 ML/MIN/1.73SQ M
GLUCOSE P FAST SERPL-MCNC: 81 MG/DL (ref 65–99)
HBA1C MFR BLD: 5.9 %
HDLC SERPL-MCNC: 62 MG/DL
LDLC SERPL CALC-MCNC: 101 MG/DL (ref 0–100)
MAGNESIUM SERPL-MCNC: 2.1 MG/DL (ref 1.9–2.7)
NONHDLC SERPL-MCNC: 136 MG/DL
POTASSIUM SERPL-SCNC: 4.5 MMOL/L (ref 3.5–5.3)
PROT SERPL-MCNC: 7.8 G/DL (ref 6.4–8.4)
SODIUM SERPL-SCNC: 139 MMOL/L (ref 135–147)
TRIGL SERPL-MCNC: 174 MG/DL
TSH SERPL DL<=0.05 MIU/L-ACNC: 0.91 UIU/ML (ref 0.45–4.5)

## 2023-10-12 PROCEDURE — 80053 COMPREHEN METABOLIC PANEL: CPT

## 2023-10-12 PROCEDURE — 36415 COLL VENOUS BLD VENIPUNCTURE: CPT

## 2023-10-12 PROCEDURE — 83735 ASSAY OF MAGNESIUM: CPT

## 2023-10-12 PROCEDURE — 80061 LIPID PANEL: CPT

## 2023-10-12 PROCEDURE — 83036 HEMOGLOBIN GLYCOSYLATED A1C: CPT

## 2023-10-12 PROCEDURE — 84443 ASSAY THYROID STIM HORMONE: CPT

## 2023-10-18 ENCOUNTER — HOSPITAL ENCOUNTER (OUTPATIENT)
Dept: NON INVASIVE DIAGNOSTICS | Facility: HOSPITAL | Age: 68
Discharge: HOME | End: 2023-10-18
Payer: COMMERCIAL

## 2023-10-18 DIAGNOSIS — R00.2 PALPITATIONS: ICD-10-CM

## 2023-10-18 PROCEDURE — 93226 XTRNL ECG REC<48 HR SCAN A/R: CPT

## 2023-10-18 PROCEDURE — 93225 XTRNL ECG REC<48 HRS REC: CPT

## 2023-10-19 ENCOUNTER — OFFICE VISIT (OUTPATIENT)
Dept: FAMILY MEDICINE CLINIC | Facility: CLINIC | Age: 68
End: 2023-10-19

## 2023-10-19 VITALS
OXYGEN SATURATION: 99 % | DIASTOLIC BLOOD PRESSURE: 78 MMHG | WEIGHT: 132 LBS | SYSTOLIC BLOOD PRESSURE: 128 MMHG | BODY MASS INDEX: 24.92 KG/M2 | HEART RATE: 72 BPM | HEIGHT: 61 IN

## 2023-10-19 DIAGNOSIS — F41.9 ANXIETY: ICD-10-CM

## 2023-10-19 DIAGNOSIS — Z23 ENCOUNTER FOR IMMUNIZATION: ICD-10-CM

## 2023-10-19 DIAGNOSIS — E78.2 MIXED HYPERLIPIDEMIA: Primary | ICD-10-CM

## 2023-10-19 DIAGNOSIS — Z12.11 SCREEN FOR COLON CANCER: ICD-10-CM

## 2023-10-19 DIAGNOSIS — Z00.00 MEDICARE ANNUAL WELLNESS VISIT, SUBSEQUENT: ICD-10-CM

## 2023-10-19 DIAGNOSIS — Z12.31 VISIT FOR SCREENING MAMMOGRAM: ICD-10-CM

## 2023-10-19 DIAGNOSIS — R73.03 PREDIABETES: ICD-10-CM

## 2023-10-19 DIAGNOSIS — I10 ESSENTIAL HYPERTENSION: ICD-10-CM

## 2023-10-19 RX ORDER — LORAZEPAM 0.5 MG/1
0.5 TABLET ORAL EVERY 8 HOURS PRN
Qty: 60 TABLET | Refills: 1 | Status: SHIPPED | OUTPATIENT
Start: 2023-10-19

## 2023-10-19 NOTE — ASSESSMENT & PLAN NOTE
Patient reports new onset of recent anxiety, related to various stressors. Started on Ativan 0.5 mg nightly as needed. States that she has been taking half a tablet tid, which has helped her significantly. Results of holter pending. Seeing cardiology in December.

## 2023-10-19 NOTE — ASSESSMENT & PLAN NOTE
A1c improved, 5.9 with improved lifestyle. Continue same. Continue monitoring with metabolic labs at 6 months.

## 2023-10-19 NOTE — PATIENT INSTRUCTIONS
Medicare Preventive Visit Patient Instructions  Thank you for completing your Welcome to Medicare Visit or Medicare Annual Wellness Visit today. Your next wellness visit will be due in one year (10/19/2024). The screening/preventive services that you may require over the next 5-10 years are detailed below. Some tests may not apply to you based off risk factors and/or age. Screening tests ordered at today's visit but not completed yet may show as past due. Also, please note that scanned in results may not display below. Preventive Screenings:  Service Recommendations Previous Testing/Comments   Colorectal Cancer Screening  * Colonoscopy    * Fecal Occult Blood Test (FOBT)/Fecal Immunochemical Test (FIT)  * Fecal DNA/Cologuard Test  * Flexible Sigmoidoscopy Age: 43-73 years old   Colonoscopy: every 10 years (may be performed more frequently if at higher risk)  OR  FOBT/FIT: every 1 year  OR  Cologuard: every 3 years  OR  Sigmoidoscopy: every 5 years  Screening may be recommended earlier than age 39 if at higher risk for colorectal cancer. Also, an individualized decision between you and your healthcare provider will decide whether screening between the ages of 77-80 would be appropriate. Colonoscopy: 11/12/2018  FOBT/FIT: Not on file  Cologuard: Not on file  Sigmoidoscopy: Not on file    Screening Current     Breast Cancer Screening Age: 36 years old  Frequency: every 1-2 years  Not required if history of left and right mastectomy Mammogram: 12/20/2022    Screening Current   Cervical Cancer Screening Between the ages of 21-29, pap smear recommended once every 3 years. Between the ages of 32-69, can perform pap smear with HPV co-testing every 5 years.    Recommendations may differ for women with a history of total hysterectomy, cervical cancer, or abnormal pap smears in past. Pap Smear: Not on file    Screening Not Indicated   Hepatitis C Screening Once for adults born between 1945 and 1965  More frequently in patients at high risk for Hepatitis C Hep C Antibody: Not on file        Diabetes Screening 1-2 times per year if you're at risk for diabetes or have pre-diabetes Fasting glucose: 81 mg/dL (10/12/2023)  A1C: 5.9 % (10/12/2023)  Screening Current   Cholesterol Screening Once every 5 years if you don't have a lipid disorder. May order more often based on risk factors. Lipid panel: 10/12/2023    Screening Not Indicated  History Lipid Disorder     Other Preventive Screenings Covered by Medicare:  Abdominal Aortic Aneurysm (AAA) Screening: covered once if your at risk. You're considered to be at risk if you have a family history of AAA. Lung Cancer Screening: covers low dose CT scan once per year if you meet all of the following conditions: (1) Age 48-67; (2) No signs or symptoms of lung cancer; (3) Current smoker or have quit smoking within the last 15 years; (4) You have a tobacco smoking history of at least 20 pack years (packs per day multiplied by number of years you smoked); (5) You get a written order from a healthcare provider. Glaucoma Screening: covered annually if you're considered high risk: (1) You have diabetes OR (2) Family history of glaucoma OR (3)  aged 48 and older OR (3)  American aged 72 and older  Osteoporosis Screening: covered every 2 years if you meet one of the following conditions: (1) You're estrogen deficient and at risk for osteoporosis based off medical history and other findings; (2) Have a vertebral abnormality; (3) On glucocorticoid therapy for more than 3 months; (4) Have primary hyperparathyroidism; (5) On osteoporosis medications and need to assess response to drug therapy. Last bone density test (DXA Scan): 07/18/2022. HIV Screening: covered annually if you're between the age of 14-79. Also covered annually if you are younger than 13 and older than 72 with risk factors for HIV infection.  For pregnant patients, it is covered up to 3 times per pregnancy. Immunizations:  Immunization Recommendations   Influenza Vaccine Annual influenza vaccination during flu season is recommended for all persons aged >= 6 months who do not have contraindications   Pneumococcal Vaccine   * Pneumococcal conjugate vaccine = PCV13 (Prevnar 13), PCV15 (Vaxneuvance), PCV20 (Prevnar 20)  * Pneumococcal polysaccharide vaccine = PPSV23 (Pneumovax) Adults 51-47 yo with certain risk factors or if 69+ yo  If never received any pneumonia vaccine: recommend Prevnar 20 (PCV20)  Give PCV20 if previously received 1 dose of PCV13 or PPSV23   Hepatitis B Vaccine 3 dose series if at intermediate or high risk (ex: diabetes, end stage renal disease, liver disease)   Respiratory syncytial virus (RSV) Vaccine - COVERED BY MEDICARE PART D  * RSVPreF3 (Arexvy) CDC recommends that adults 61years of age and older may receive a single dose of RSV vaccine using shared clinical decision-making (SCDM)   Tetanus (Td) Vaccine - COST NOT COVERED BY MEDICARE PART B Following completion of primary series, a booster dose should be given every 10 years to maintain immunity against tetanus. Td may also be given as tetanus wound prophylaxis. Tdap Vaccine - COST NOT COVERED BY MEDICARE PART B Recommended at least once for all adults. For pregnant patients, recommended with each pregnancy. Shingles Vaccine (Shingrix) - COST NOT COVERED BY MEDICARE PART B  2 shot series recommended in those 19 years and older who have or will have weakened immune systems or those 50 years and older     Health Maintenance Due:      Topic Date Due   • Hepatitis C Screening  Never done   • Colorectal Cancer Screening  11/12/2023   • Breast Cancer Screening: Mammogram  12/20/2023     Immunizations Due:      Topic Date Due   • Influenza Vaccine (1) 09/01/2023     Advance Directives   What are advance directives? Advance directives are legal documents that state your wishes and plans for medical care.  These plans are made ahead of time in case you lose your ability to make decisions for yourself. Advance directives can apply to any medical decision, such as the treatments you want, and if you want to donate organs. What are the types of advance directives? There are many types of advance directives, and each state has rules about how to use them. You may choose a combination of any of the following:  Living will: This is a written record of the treatment you want. You can also choose which treatments you do not want, which to limit, and which to stop at a certain time. This includes surgery, medicine, IV fluid, and tube feedings. Durable power of  for healthcare [facility]): This is a written record that states who you want to make healthcare choices for you when you are unable to make them for yourself. This person, called a proxy, is usually a family member or a friend. You may choose more than 1 proxy. Do not resuscitate (DNR) order:  A DNR order is used in case your heart stops beating or you stop breathing. It is a request not to have certain forms of treatment, such as CPR. A DNR order may be included in other types of advance directives. Medical directive: This covers the care that you want if you are in a coma, near death, or unable to make decisions for yourself. You can list the treatments you want for each condition. Treatment may include pain medicine, surgery, blood transfusions, dialysis, IV or tube feedings, and a ventilator (breathing machine). Values history: This document has questions about your views, beliefs, and how you feel and think about life. This information can help others choose the care that you would choose. Why are advance directives important? An advance directive helps you control your care. Although spoken wishes may be used, it is better to have your wishes written down. Spoken wishes can be misunderstood, or not followed. Treatments may be given even if you do not want them.  An advance directive may make it easier for your family to make difficult choices about your care. Urinary Incontinence   Urinary incontinence (UI)  is when you lose control of your bladder. UI develops because your bladder cannot store or empty urine properly. The 3 most common types of UI are stress incontinence, urge incontinence, or both. Medicines:   May be given to help strengthen your bladder control. Report any side effects of medication to your healthcare provider. Do pelvic muscle exercises often:  Your pelvic muscles help you stop urinating. Squeeze these muscles tight for 5 seconds, then relax for 5 seconds. Gradually work up to squeezing for 10 seconds. Do 3 sets of 15 repetitions a day, or as directed. This will help strengthen your pelvic muscles and improve bladder control. Train your bladder:  Go to the bathroom at set times, such as every 2 hours, even if you do not feel the urge to go. You can also try to hold your urine when you feel the urge to go. For example, hold your urine for 5 minutes when you feel the urge to go. As that becomes easier, hold your urine for 10 minutes. Self-care:   Keep a UI record. Write down how often you leak urine and how much you leak. Make a note of what you were doing when you leaked urine. Drink liquids as directed. You may need to limit the amount of liquid you drink to help control your urine leakage. Do not drink any liquid right before you go to bed. Limit or do not have drinks that contain caffeine or alcohol. Prevent constipation. Eat a variety of high-fiber foods. Good examples are high-fiber cereals, beans, vegetables, and whole-grain breads. Walking is the best way to trigger your intestines to have a bowel movement. Exercise regularly and maintain a healthy weight. Weight loss and exercise will decrease pressure on your bladder and help you control your leakage. Use a catheter as directed  to help empty your bladder.  A catheter is a tiny, plastic tube that is put into your bladder to drain your urine. Go to behavior therapy as directed. Behavior therapy may be used to help you learn to control your urge to urinate. © Copyright [employer] 2018 Information is for End User's use only and may not be sold, redistributed or otherwise used for commercial purposes.  All illustrations and images included in CareNotes® are the copyrighted property of [employer] Inc[employer] or [address] [place] [address]

## 2023-10-19 NOTE — PROGRESS NOTES
Assessment and Plan:     Problem List Items Addressed This Visit    None  Visit Diagnoses       Encounter for immunization    -  Primary          1. Mixed hyperlipidemia  Assessment & Plan:  LDL is at goal.  Continue atorvastatin 10 milligram.  Metabolic labs/follow-up at 6 month. Orders:  -     Lipid panel; Future; Expected date: 03/17/2024    2. Encounter for immunization  -     influenza vaccine, high-dose, PF 0.7 mL (FLUZONE HIGH-DOSE)    3. Anxiety  Assessment & Plan:  Patient reports new onset of recent anxiety, related to various stressors. Started on Ativan 0.5 mg nightly as needed. States that she has been taking half a tablet tid, which has helped her significantly. Results of holter pending. Seeing cardiology in December. 4. Prediabetes  Assessment & Plan:  A1c improved, 5.9 with improved lifestyle. Continue same. Continue monitoring with metabolic labs at 6 months. Orders:  -     Comprehensive metabolic panel; Future; Expected date: 03/17/2024  -     Hemoglobin A1C; Future; Expected date: 03/17/2024    5. Screen for colon cancer  -     Ambulatory Referral to Gastroenterology; Future    6. Visit for screening mammogram  Assessment & Plan:  Due for breast and colorectal cancer screening. Patient received influenza vaccine today. Orders:  -     Mammo screening bilateral w 3d & cad; Future; Expected date: 10/19/2023    7. Essential hypertension  Assessment & Plan:  Patient's blood pressure is at goal.  Continue valsartan 160, hydrochlorothiazide 25 mg.       8. Medicare annual wellness visit, subsequent  Assessment & Plan:  Due for breast and colorectal cancer screening. Patient received influenza vaccine today. Depression Screening and Follow-up Plan: Patient was screened for depression during today's encounter. They screened negative with a PHQ-2 score of 0. Urinary Incontinence Plan of Care: counseling topics discussed: use restroom every 2 hours. Preventive health issues were discussed with patient, and age appropriate screening tests were ordered as noted in patient's After Visit Summary. Personalized health advice and appropriate referrals for health education or preventive services given if needed, as noted in patient's After Visit Summary. History of Present Illness:     Patient presents for a Medicare Wellness Visit    HPI   Patient is here for routine 6-month follow-up. Also due for Medicare wellness. Patient has history of hypertension, dyslipidemia, prediabetes. Metabolic labs reviewed with patient. Noted to have A1c of 5.9. LDL is at goal.  Renal function stable. Patient has undergone Holter monitor for symptoms of palpitations. The Holter monitor has not been resulted yet. Patient has an appointment with cardiology in December. In the meantime she was started on Ativan for treatment of anxiety. Patient takes half a tablet 3 times a day and reports significant improvement in her symptoms. Patient would like to continue the Ativan for now. Can be started on a controller anxiety medication once the results of Holter are available. Patient Care Team:  Deshaun Pollock MD as PCP - General (Family Medicine)     Review of Systems:     Review of Systems   Constitutional: Negative. Respiratory: Negative. Cardiovascular: Negative.          Problem List:     Patient Active Problem List   Diagnosis    Essential hypertension    Mixed hyperlipidemia    Visit for screening mammogram    Primary osteoarthritis involving multiple joints    Eczema    Biceps tendinitis of right shoulder    Need for vaccination    Osteoporosis    Arthritis of right glenohumeral joint    Elevated fasting blood sugar    Prediabetes    Left foot pain    Rash    Palpitations    Anxiety      Past Medical and Surgical History:     Past Medical History:   Diagnosis Date    Allergic 2010    Hive reaction to Macrodantin, 2014 - reaction to shrimp    Arthritis ?    take Meloxicam, Glucosamine & Chondroitin    Hypertension 2000? take hydrochlorothiazide, Valsartan & Atorvastatin    Urinary tract infection 1975    Occasional bladder infections throughout adulthood     Past Surgical History:   Procedure Laterality Date     SECTION  1980    EYE SURGERY  2020    cataract surgery R eye    HYSTERECTOMY  2000    OOPHORECTOMY      PAROTIDECTOMY        Family History:     Family History   Problem Relation Age of Onset    Depression Mother         Bi-polar    Cancer Mother         Non-small cell lung cancer (non-smoker)    Bipolar disorder Mother     Dementia Maternal Grandmother     Dementia Maternal Uncle     Hypertension Father     Heart disease Father     Hearing loss Father     Hypertension Brother     Heart disease Maternal Grandfather     Diabetes Maternal Grandfather     Completed Suicide  Maternal Uncle     No Known Problems Daughter     No Known Problems Daughter     No Known Problems Paternal Aunt       Social History:     Social History     Socioeconomic History    Marital status: /Civil Union     Spouse name: Not on file    Number of children: Not on file    Years of education: Not on file    Highest education level: Not on file   Occupational History    Not on file   Tobacco Use    Smoking status: Never    Smokeless tobacco: Never   Vaping Use    Vaping Use: Never used   Substance and Sexual Activity    Alcohol use: Yes     Alcohol/week: 1.0 standard drink of alcohol     Types: 1 Glasses of wine per week     Comment: Very limited. Less than 1 per week.     Drug use: Never    Sexual activity: Not Currently     Partners: Male     Birth control/protection: Post-menopausal, None   Other Topics Concern    Not on file   Social History Narrative    Not on file     Social Determinants of Health     Financial Resource Strain: Low Risk  (10/16/2023)    Overall Financial Resource Strain (CARDIA)     Difficulty of Paying Living Expenses: Not hard at all   Food Insecurity: Not on file   Transportation Needs: No Transportation Needs (10/16/2023)    PRAPARE - Transportation     Lack of Transportation (Medical): No     Lack of Transportation (Non-Medical): No   Physical Activity: Not on file   Stress: Not on file   Social Connections: Not on file   Intimate Partner Violence: Not on file   Housing Stability: Not on file      Medications and Allergies:     Current Outpatient Medications   Medication Sig Dispense Refill    ascorbic acid (VITAMIN C) 500 MG tablet Take 500 mg by mouth      atorvastatin (LIPITOR) 10 mg tablet TAKE 1 TABLET BY MOUTH EVERY DAY 90 tablet 1    cholecalciferol (VITAMIN D3) 400 units tablet Take 400 Units by mouth      clobetasol (TEMOVATE) 0.05 % ointment Apply topically 2 (two) times a day 45 g 3    Echinacea 125 MG CAPS Take by mouth      glucosamine-chondroitin 500-400 MG tablet Take 2 tablets by mouth       hydrochlorothiazide (HYDRODIURIL) 25 mg tablet TAKE 1 TABLET (25 MG TOTAL) BY MOUTH DAILY. 90 tablet 1    ketoconazole (NIZORAL) 2 % cream Apply topically daily 30 g 3    LORazepam (Ativan) 0.5 mg tablet Take 1 tablet (0.5 mg total) by mouth every 8 (eight) hours as needed for anxiety 30 tablet 0    Multiple Vitamins-Minerals (MULTIVITAMIN ADULT EXTRA C PO) Take 1 tablet by mouth      valsartan (DIOVAN) 160 mg tablet TAKE 1 TABLET BY MOUTH EVERY DAY 90 tablet 1     No current facility-administered medications for this visit.      Allergies   Allergen Reactions    Nitrofurantoin Hives    Shrimp Extract Allergy Skin Test - Food Allergy Hives     Scratchy throat     Pneumococcal Vaccine Other (See Comments)    Shellfish Allergy - Food Allergy Hives    Sulfa Antibiotics Hives, Itching and Lip Swelling      Immunizations:     Immunization History   Administered Date(s) Administered    COVID-19 MODERNA VACC 0.25 ML IM BOOSTER 11/02/2021    COVID-19 MODERNA VACC 0.5 ML IM 02/10/2021, 03/10/2021    COVID-19 Pfizer Vac BIVALENT Paul-sucrose 12 Yr+ IM 09/20/2023    Influenza, high dose seasonal 0.7 mL 10/19/2021, 10/10/2022    Pneumococcal 01/01/2018, 01/01/2019    Tdap 10/19/2021      Health Maintenance:         Topic Date Due    Hepatitis C Screening  Never done    Colorectal Cancer Screening  11/12/2023    Breast Cancer Screening: Mammogram  12/20/2023         Topic Date Due    Influenza Vaccine (1) 09/01/2023      Medicare Screening Tests and Risk Assessments: Khushboo Patton is here for her Subsequent Wellness visit. Last Medicare Wellness visit information reviewed, patient interviewed, no change since last AWV. Health Risk Assessment:   Patient rates overall health as good. Patient feels that their physical health rating is slightly better. Patient is satisfied with their life. Eyesight was rated as same. Hearing was rated as same. Patient feels that their emotional and mental health rating is same. Patients states they are never, rarely angry. Patient states they are never, rarely unusually tired/fatigued. Pain experienced in the last 7 days has been none. Patient states that she has experienced no weight loss or gain in last 6 months. Depression Screening:   PHQ-2 Score: 0      Fall Risk Screening: In the past year, patient has experienced: no history of falling in past year      Urinary Incontinence Screening:   Patient has leaked urine accidently in the last six months. Home Safety:  Patient does not have trouble with stairs inside or outside of their home. Patient has working smoke alarms and has working carbon monoxide detector. Home safety hazards include: none. Nutrition:   Current diet is Low Saturated Fat and Low Carb. Medications:   Patient is currently taking over-the-counter supplements. OTC medications include: see medication list. Patient is able to manage medications.      Activities of Daily Living (ADLs)/Instrumental Activities of Daily Living (IADLs):   Walk and transfer into and out of bed and chair?: Yes  Dress and groom yourself?: Yes    Bathe or shower yourself?: Yes    Feed yourself? Yes  Do your laundry/housekeeping?: Yes  Manage your money, pay your bills and track your expenses?: Yes  Make your own meals?: Yes    Do your own shopping?: Yes    Previous Hospitalizations:   Any hospitalizations or ED visits within the last 12 months?: No      Advance Care Planning:   Living will: No    Durable POA for healthcare: Yes    Advanced directive: No      Cognitive Screening:   Provider or family/friend/caregiver concerned regarding cognition?: No    PREVENTIVE SCREENINGS      Cardiovascular Screening:    General: Screening Not Indicated and History Lipid Disorder      Diabetes Screening:     General: Screening Current      Colorectal Cancer Screening:     General: Screening Current      Breast Cancer Screening:     General: Screening Current      Cervical Cancer Screening:    General: Screening Not Indicated      Osteoporosis Screening:    General: Screening Not Indicated and History Osteoporosis      Abdominal Aortic Aneurysm (AAA) Screening:        General: Screening Not Indicated      Lung Cancer Screening:     General: Screening Not Indicated      Hepatitis C Screening:    General: Patient Declines    Screening, Brief Intervention, and Referral to Treatment (SBIRT)    Screening  Typical number of drinks in a day: 0  Typical number of drinks in a week: 1  Interpretation: Low risk drinking behavior. AUDIT-C Screenin) How often did you have a drink containing alcohol in the past year? 2 to 4 times a month  2) How many drinks did you have on a typical day when you were drinking in the past year?  1 to 2  3) How often did you have 6 or more drinks on one occasion in the past year? never    AUDIT-C Score: 2  Interpretation: Score 0-2 (female): Negative screen for alcohol misuse    Single Item Drug Screening:  How often have you used an illegal drug (including marijuana) or a prescription medication for non-medical reasons in the past year? never    Single Item Drug Screen Score: 0  Interpretation: Negative screen for possible drug use disorder    Other Counseling Topics:   Car/seat belt/driving safety, skin self-exam, sunscreen and calcium and vitamin D intake and regular weightbearing exercise. No results found. Physical Exam:     There were no vitals taken for this visit. Physical Exam  Constitutional:       Appearance: Normal appearance. Cardiovascular:      Heart sounds: Normal heart sounds. Pulmonary:      Breath sounds: Normal breath sounds. Neurological:      Mental Status: She is oriented to person, place, and time.    Psychiatric:         Mood and Affect: Mood normal.          June Leblanc MD

## 2023-10-27 DIAGNOSIS — R00.2 PALPITATIONS: Primary | ICD-10-CM

## 2023-10-27 DIAGNOSIS — R94.31 ABNORMAL EKG: ICD-10-CM

## 2023-10-27 RX ORDER — METOPROLOL SUCCINATE 25 MG/1
25 TABLET, EXTENDED RELEASE ORAL DAILY
Qty: 90 TABLET | Refills: 0 | Status: SHIPPED | OUTPATIENT
Start: 2023-10-27

## 2023-11-08 ENCOUNTER — OFFICE VISIT (OUTPATIENT)
Dept: DERMATOLOGY | Facility: CLINIC | Age: 68
End: 2023-11-08
Payer: COMMERCIAL

## 2023-11-08 VITALS — HEIGHT: 61 IN | WEIGHT: 132 LBS | TEMPERATURE: 97.9 F | BODY MASS INDEX: 24.92 KG/M2

## 2023-11-08 DIAGNOSIS — B07.9 VERRUCA VULGARIS: ICD-10-CM

## 2023-11-08 DIAGNOSIS — D22.9 MULTIPLE MELANOCYTIC NEVI: Primary | ICD-10-CM

## 2023-11-08 DIAGNOSIS — D23.9 DERMATOFIBROMA: ICD-10-CM

## 2023-11-08 DIAGNOSIS — L85.3 XEROSIS OF SKIN: ICD-10-CM

## 2023-11-08 DIAGNOSIS — D18.01 CHERRY ANGIOMA: ICD-10-CM

## 2023-11-08 DIAGNOSIS — L81.4 LENTIGO: ICD-10-CM

## 2023-11-08 DIAGNOSIS — L60.2 ONYCHOGRYPHOSIS: ICD-10-CM

## 2023-11-08 PROCEDURE — 99203 OFFICE O/P NEW LOW 30 MIN: CPT | Performed by: DERMATOLOGY

## 2023-11-08 NOTE — PATIENT INSTRUCTIONS
MELANOCYTIC NEVI ("Moles")        Assessment and Plan:  Based on a thorough discussion of this condition and the management approach to it (including a comprehensive discussion of the known risks, side effects and potential benefits of treatment), the patient (family) agrees to implement the following specific plan:  When outside we recommend using a wide brim hat, sunglasses, long sleeve and pants, sunscreen with SPF 49+ with reapplication every 2 hours, or SPF specific clothing   Benign, reassured  Annual skin check     Melanocytic Nevi  Melanocytic nevi ("moles") are tan or brown, raised or flat areas of the skin which have an increased number of melanocytes. Melanocytes are the cells in our body which make pigment and account for skin color. Some moles are present at birth (I.e., "congenital nevi"), while others come up later in life (i.e., "acquired nevi"). The sun can stimulate the body to make more moles. Sunburns are not the only thing that triggers more moles. Chronic sun exposure can do it too. Clinically distinguishing a healthy mole from melanoma may be difficult, even for experienced dermatologists. The "ABCDE's" of moles have been suggested as a means of helping to alert a person to a suspicious mole and the possible increased risk of melanoma. The suggestions for raising alert are as follows:    Asymmetry: Healthy moles tend to be symmetric, while melanomas are often asymmetric. Asymmetry means if you draw a line through the mole, the two halves do not match in color, size, shape, or surface texture. Asymmetry can be a result of rapid enlargement of a mole, the development of a raised area on a previously flat lesion, scaling, ulceration, bleeding or scabbing within the mole. Any mole that starts to demonstrate "asymmetry" should be examined promptly by a board certified dermatologist.     Border: Healthy moles tend to have discrete, even borders.   The border of a melanoma often blends into the normal skin and does not sharply delineate the mole from normal skin. Any mole that starts to demonstrate "uneven borders" should be examined promptly by a board certified dermatologist.     Color: Healthy moles tend to be one color throughout. Melanomas tend to be made up of different colors ranging from dark black, blue, white, or red. Any mole that demonstrates a color change should be examined promptly by a board certified dermatologist.     Diameter: Healthy moles tend to be smaller than 0.6 cm in size; an exception are "congenital nevi" that can be larger. Melanomas tend to grow and can often be greater than 0.6 cm (1/4 of an inch, or the size of a pencil eraser). This is only a guideline, and many normal moles may be larger than 0.6 cm without being unhealthy. Any mole that starts to change in size (small to bigger or bigger to smaller) should be examined promptly by a board certified dermatologist.     Evolving: Healthy moles tend to "stay the same."  Melanomas may often show signs of change or evolution such as a change in size, shape, color, or elevation. Any mole that starts to itch, bleed, crust, burn, hurt, or ulcerate or demonstrate a change or evolution should be examined promptly by a board certified dermatologist.        Forrestine Lied and Plan:  Based on a thorough discussion of this condition and the management approach to it (including a comprehensive discussion of the known risks, side effects and potential benefits of treatment), the patient (family) agrees to implement the following specific plan:  When outside we recommend using a wide brim hat, sunglasses, long sleeve and pants, sunscreen with SPF 02+ with reapplication every 2 hours, or SPF specific clothing       What is a lentigo? A lentigo is a pigmented flat or slightly raised lesion with a clearly defined edge. Unlike an ephelis (freckle), it does not fade in the winter months.  There are several kinds of lentigo. The name lentigo originally referred to its appearance resembling a small lentil. The plural of lentigo is lentigines, although “lentigos” is also in common use. Who gets lentigines? Lentigines can affect males and females of all ages and races. Solar lentigines are especially prevalent in fair skinned adults. Lentigines associated with syndromes are present at birth or arise during childhood. What causes lentigines? Common forms of lentigo are due to exposure to ultraviolet radiation:  Sun damage including sunburn   Indoor tanning   Phototherapy, especially photochemotherapy (PUVA)    Ionizing radiation, eg radiation therapy, can also cause lentigines. Several familial syndromes associated with widespread lentigines originate from mutations in Quentin-MAP kinase, mTOR signaling and PTEN pathways. What is the treatment for lentigines? Most lentigines are left alone. Attempts to lighten them may not be successful. The following approaches are used:  SPF 50+ broad-spectrum sunscreen   Hydroquinone bleaching cream   Alpha hydroxy acids   Vitamin C   Retinoids   Azelaic acid   Chemical peels  Individual lesions can be permanently removed using:  Cryotherapy   Intense pulsed light   Pigment lasers    How can lentigines be prevented? Lentigines associated with exposure ultraviolet radiation can be prevented by very careful sun protection. Clothing is more successful at preventing new lentigines than are sunscreens. What is the outlook for lentigines? Lentigines usually persist. They may increase in number with age and sun exposure. Some in sun-protected sites may fade and disappear.     GARCIA ANGIOMAS      Assessment and Plan:  Based on a thorough discussion of this condition and the management approach to it (including a comprehensive discussion of the known risks, side effects and potential benefits of treatment), the patient (family) agrees to implement the following specific plan:  Monitor for changes  Benign, reassured      Assessment and Plan:    Cherry angioma, also known as [employer] spots, are benign vascular skin lesions. A "cherry angioma" is a firm red, blue or purple papule, 0.1-1 cm in diameter. When thrombosed, they can appear black in colour until evaluated with a dermatoscope when the red or purple colour is more easily seen. Cherry angioma may develop on any part of the body but most often appear on the scalp, face, lips and trunk. An angioma is due to proliferating endothelial cells; these are the cells that line the inside of a blood vessel. Angiomas can arise in early life or later in life; the most common type of angioma is a cherry angioma. Cherry angiomas are very common in males and females of any age or race. They are more noticeable in white skin than in skin of colour. They markedly increase in number from about the age of 36. There may be a family history of similar lesions. Eruptive cherry angiomas have been rarely reported to be associated with internal malignancy. The cause of angiomas is unknown. Genetic analysis of cherry angiomas has shown that they frequently carry specific somatic missense mutations in the GNAQ and GNA11 (Q209H) genes, which are involved in other vascular and melanocytic proliferations. XEROSIS ("DRY SKIN")        Assessment and Plan:  Based on a thorough discussion of this condition and the management approach to it (including a comprehensive discussion of the known risks, side effects and potential benefits of treatment), the patient (family) agrees to implement the following specific plan:  Use moisturizer like Eucerin,Cerave or Aveeno Cream 3 times a day for the dry skin            Dry skin refers to skin that feels dry to touch. Dry skin has a dull surface with a rough, scaly quality. The skin is less pliable and cracked. When dryness is severe, the skin may become inflamed and fissured.   Although any body site can be dry, dry skin tends to affect the shins more than any other site. Dry skin is lacking moisture in the outer horny cell layer (stratum corneum) and this results in cracks in the skin surface. Dry skin is also called xerosis, xeroderma or asteatosis (lack of fat). It can affect males and females of all ages. There is some racial variability in water and lipid content of the skin. Dry skin that starts in early childhood may be one of about 20 types of ichthyosis (fish-scale skin). There is often a family history of dry skin. Dry skin is commonly seen in people with atopic dermatitis. Nearly everyone > 60 years has dry skin. Dry skin that begins later may be seen in people with certain diseases and conditions. Postmenopausal women  Hypothyroidism  Chronic renal disease   Malnutrition and weight loss   Subclinical dermatitis   Treatment with certain drugs such as oral retinoids, diuretics and epidermal growth factor receptor inhibitors      What is the treatment for dry skin? The mainstay of treatment of dry skin and ichthyosis is moisturisers/emollients. They should be applied liberally and often enough to:  Reduce itch   Improve the barrier function   Prevent entry of irritants, bacteria   Reduce transepidermal water loss. How can dry skin be prevented? Eliminate aggravating factors:  Reduce the frequency of bathing. A humidifier in winter and air conditioner in summer   Compare having a short shower with a prolonged soak in a bath. Use lukewarm, not hot, water. Replace standard soap with a substitute such as a synthetic detergent cleanser, water-miscible emollient, bath oil, anti-pruritic tar oil, colloidal oatmeal etc.   Apply an emollient liberally and often, particularly shortly after bathing, and when itchy. The drier the skin, the thicker this should be, especially on the hands. What is the outlook for dry skin?   A tendency to dry skin may persist life-long, or it may improve once contributing factors are controlled. VERRUCA VULGARIS ("Common Warts")      Assessment and Plan:  Based on a thorough discussion of this condition and the management approach to it (including a comprehensive discussion of the known risks, side effects and potential benefits of treatment), the patient (family) agrees to implement the following specific plan:   Continue over the counter 77% salicylic acid pads should be applied at bedtime. In the morning remove pad and file each wart with a dedicated nail file or pumice stone. Do not use the same file or stone on any other family member or other unaffected location      Verruca Vulgaris  A verruca is a common growth of the skin caused by infection by human papilloma virus (HPV). There are many strains of the virus that cause different types of warts on the body. The virus infects the most superficial layers of the skin, causing increased production of skin cells and thickening. Warts can be spread through direct contact with infected skin and may spread to other parts of the body if scratched or picked. A verruca is more commonly called a "wart." Warts are particularly common in school-aged children but can arise at any age. Patients who have a history of eczema are especially prone due to impaired skin barrier. Those taking immunosuppressive drugs or with HIV infections may experience prolonged symptoms despite treatment. Warts generally have a rough surface with a tiny black dot sometimes observed in the middle of each scaly spot. They can range in size from a small bump to large scaly growths. Common warts are often found on the backs of fingers or toes, around the nails, and on the knees. Plantar warts can grow inwardly on the soles of the feet causing pain. There are many possible ways to treat warts and sometimes several different treatments are needed to get the warts to go away completely.  There is no single perfect treatment for warts, and successful treatment can take many months. In-office treatments usually require multiple visits, and include:  Cryotherapy. a cold spray with liquid nitrogen will destroy the infected cells but may lead to discomfort and blistering. It may also leave a permanent white shiloh or scar. Electrosurgery (curettage and cautery) can be used for large resistant warts which involves shaving the growth down and burning the base. It is performed under local anesthesia and may leave a permanent scar    Candida (“yeast”) antigen injections. These are extracts of the common yeast (Candida) that cannot cause an infection. The medication is injected into/under the wart. It is thought to stimulate the immune system to recognize the wart virus and attack it. Multiple injections are often needed about one month apart. There are also several at-home wart treatments:    Soak the warts in warm water for 5 minutes every night followed by gentle filing with a nail file or pumice stone. Topical salicylic acid or similar compounds work by removing the dead surface skin cells  Apply the medicine directly to the wart, wait for it to dry completely, then cover with duct tape overnight   Repeat until the wart is gone, which can take 2-4 months  Do not use on the face or groin area   If the wart paint makes the skin sore, stop treatment until the discomfort has settled, then recommence as above. Take care to keep the chemical off normal skin. Podophyllin is a cytotoxic agent used in some products and must not be used in pregnancy or women considering pregnancy. Some prescription medications include   Topical retinoids (adapalene, tretinoin, tazarotene), 5-fluorouracil (Efudex) or imiquimod (Aldara) creams are sometimes used to treat flat warts or warts on the face and other sensitive anatomical areas. They are usually applied directly to the warts once a day for 2-4 months and can be irritating.   These treatments should only be used as directed by your health care provider. Systemic treatment with oral cimetidine (Tagamet) may help boost the immune system against the wart virus in patients, some of the time. Initiation of cimetidine therapy should ONLY be done under the supervision of your health care provider, who can discuss possible side effects and drug-to-drug interactions of this specific treatment. DERMATOFIBROMA        Assessment and Plan:  Based on a thorough discussion of this condition and the management approach to it (including a comprehensive discussion of the known risks, side effects and potential benefits of treatment), the patient (family) agrees to implement the following specific plan:  Reassured benign     Assessment and Plan:  A dermatofibroma is a common benign fibrous nodule that most often arises on the skin of the lower legs. A dermatofibroma is also called a "cutaneous fibrous histiocytoma."  Dermatofibromas occur at all ages and in people of every ethnicity. They are more common in women than in men. It is not clear if dermatofibroma is a reactive process or if it is a neoplasm. The lesions are made up of proliferating fibroblasts. Histiocytes may also be involved. They are sometimes attributed to an insect bite or ingrownhair or local trauma, but not consistently. They may be more numerous in patients with altered immunity. Dermatofibromas most often occur on the legs and arms, but may also arise on the trunk or any site of the body. Typical clinical features include the following:  People may have 1 or up to 15 lesions. Size varies from 0.5-1.5 cm diameter; most lesions are 7-10 mm diameter. They are firm nodules tethered to the skin surface and mobile over subcutaneous tissue. The skin "dimples" on pinching the lesion. Color may be pink to light brown in white skin, and dark brown to black in dark skin; some appear paler in the center.   They do not usually cause symptoms, but they are sometimes painful or itchy. Because they are often raised lesions, they may be traumatized, for example by a razor. Occasionally dozens may erupt within a few months, usually in the setting of immunosuppression (for example autoimmune disease, cancer or certain medications). Dermatofibroma does not give rise to cancer. However, occasionally, it may be mistaken for dermatofibrosarcoma or desmoplastic melanoma. A dermatofibroma is harmless and seldom causes any symptoms. Usually, only reassurance is needed. If it is nuisance or causing concern, the lesion can be removed surgically, resulting in a scar that is, by definition, usually longer in diameter than the widest portion of the dermatofibroma. Cryotherapy, shave biopsy and laser surgery are rarely completely successful. Skin punch biopsy or incisional biopsy may be undertaken if there is an atypical feature such as recent enlargement, ulceration, or asymmetrical structures and colours on dermatoscopy. VERRUCA VULGARIS ("Common Warts")        Assessment and Plan:  Based on a thorough discussion of this condition and the management approach to it (including a comprehensive discussion of the known risks, side effects and potential benefits of treatment), the patient (family) agrees to implement the following specific plan:   Continue over the counter 30% salicylic acid pads should be applied at bedtime. In the morning remove pad and file each wart with a dedicated nail file or pumice stone. Do not use the same file or stone on any other family member or other unaffected location      Verruca Vulgaris  A verruca is a common growth of the skin caused by infection by human papilloma virus (HPV). There are many strains of the virus that cause different types of warts on the body. The virus infects the most superficial layers of the skin, causing increased production of skin cells and thickening.  Warts can be spread through direct contact with infected skin and may spread to other parts of the body if scratched or picked. A verruca is more commonly called a "wart." Warts are particularly common in school-aged children but can arise at any age. Patients who have a history of eczema are especially prone due to impaired skin barrier. Those taking immunosuppressive drugs or with HIV infections may experience prolonged symptoms despite treatment. Warts generally have a rough surface with a tiny black dot sometimes observed in the middle of each scaly spot. They can range in size from a small bump to large scaly growths. Common warts are often found on the backs of fingers or toes, around the nails, and on the knees. Plantar warts can grow inwardly on the soles of the feet causing pain. There are many possible ways to treat warts and sometimes several different treatments are needed to get the warts to go away completely. There is no single perfect treatment for warts, and successful treatment can take many months. In-office treatments usually require multiple visits, and include:  Cryotherapy. a cold spray with liquid nitrogen will destroy the infected cells but may lead to discomfort and blistering. It may also leave a permanent white shiloh or scar. Electrosurgery (curettage and cautery) can be used for large resistant warts which involves shaving the growth down and burning the base. It is performed under local anesthesia and may leave a permanent scar    Candida (“yeast”) antigen injections. These are extracts of the common yeast (Candida) that cannot cause an infection. The medication is injected into/under the wart. It is thought to stimulate the immune system to recognize the wart virus and attack it. Multiple injections are often needed about one month apart. There are also several at-home wart treatments:    Soak the warts in warm water for 5 minutes every night followed by gentle filing with a nail file or pumice stone.     Topical salicylic acid or similar compounds work by removing the dead surface skin cells  Apply the medicine directly to the wart, wait for it to dry completely, then cover with duct tape overnight   Repeat until the wart is gone, which can take 2-4 months  Do not use on the face or groin area   If the wart paint makes the skin sore, stop treatment until the discomfort has settled, then recommence as above. Take care to keep the chemical off normal skin. Podophyllin is a cytotoxic agent used in some products and must not be used in pregnancy or women considering pregnancy. Some prescription medications include   Topical retinoids (adapalene, tretinoin, tazarotene), 5-fluorouracil (Efudex) or imiquimod (Aldara) creams are sometimes used to treat flat warts or warts on the face and other sensitive anatomical areas. They are usually applied directly to the warts once a day for 2-4 months and can be irritating. These treatments should only be used as directed by your health care provider. Systemic treatment with oral cimetidine (Tagamet) may help boost the immune system against the wart virus in patients, some of the time. Initiation of cimetidine therapy should ONLY be done under the supervision of your health care provider, who can discuss possible side effects and drug-to-drug interactions of this specific treatment. DERMATOFIBROMA      Assessment and Plan:  Based on a thorough discussion of this condition and the management approach to it (including a comprehensive discussion of the known risks, side effects and potential benefits of treatment), the patient (family) agrees to implement the following specific plan:  Reassured benign     Assessment and Plan:  A dermatofibroma is a common benign fibrous nodule that most often arises on the skin of the lower legs. A dermatofibroma is also called a "cutaneous fibrous histiocytoma."  Dermatofibromas occur at all ages and in people of every ethnicity.  They are more common in women than in men. It is not clear if dermatofibroma is a reactive process or if it is a neoplasm. The lesions are made up of proliferating fibroblasts. Histiocytes may also be involved. They are sometimes attributed to an insect bite or ingrownhair or local trauma, but not consistently. They may be more numerous in patients with altered immunity. Dermatofibromas most often occur on the legs and arms, but may also arise on the trunk or any site of the body. Typical clinical features include the following:  People may have 1 or up to 15 lesions. Size varies from 0.5-1.5 cm diameter; most lesions are 7-10 mm diameter. They are firm nodules tethered to the skin surface and mobile over subcutaneous tissue. The skin "dimples" on pinching the lesion. Color may be pink to light brown in white skin, and dark brown to black in dark skin; some appear paler in the center. They do not usually cause symptoms, but they are sometimes painful or itchy. Because they are often raised lesions, they may be traumatized, for example by a razor. Occasionally dozens may erupt within a few months, usually in the setting of immunosuppression (for example autoimmune disease, cancer or certain medications). Dermatofibroma does not give rise to cancer. However, occasionally, it may be mistaken for dermatofibrosarcoma or desmoplastic melanoma. A dermatofibroma is harmless and seldom causes any symptoms. Usually, only reassurance is needed. If it is nuisance or causing concern, the lesion can be removed surgically, resulting in a scar that is, by definition, usually longer in diameter than the widest portion of the dermatofibroma. Cryotherapy, shave biopsy and laser surgery are rarely completely successful. Skin punch biopsy or incisional biopsy may be undertaken if there is an atypical feature such as recent enlargement, ulceration, or asymmetrical structures and colours on dermatoscopy.      ONYCHOGRYPHOSIS       Assessment and Plan:  Based on a thorough discussion of this condition and the management approach to it (including a comprehensive discussion of the known risks, side effects and potential benefits of treatment), the patient (family) agrees to implement the following specific plan:   With nails moist and soft can try grinding them gently   Moisturize feet with Urea 40 % cream

## 2023-11-08 NOTE — PROGRESS NOTES
[facility] Note     Patient Name: Erik Dudley  Encounter Date: 11/08/2023    Have you been cared for by a [facility] Dermatologist in the last 3 years and, if so, which description applies to you? NO. I am considered a "new" patient and must complete all patient intake questions. I am FEMALE/of child-bearing potential.    REVIEW OF SYSTEMS:  Have you recently had or currently have any of the following? Recent fever or chills? No  Any non-healing wound? No  Are you pregnant or planning to become pregnant? No  Are you currently or planning to be nursing or breast feeding? No   PAST MEDICAL HISTORY:  Have you personally ever had or currently have any of the following? If "YES," then please provide more detail. Skin cancer (such as Melanoma, Basal Cell Carcinoma, Squamous Cell Carcinoma? No  Tuberculosis, HIV/AIDS, Hepatitis B or C: No  Radiation Treatment No   HISTORY OF IMMUNOSUPPRESSION:   Do you have a history of any of the following:  Systemic Immunosuppression such as Diabetes, Biologic or Immunotherapy, Chemotherapy, Organ Transplantation, Bone Marrow Transplantation? No    Answering "YES" requires the addition of the dotphrase "IMMUNOSUPPRESSED" as the first diagnosis of the patient's visit. FAMILY HISTORY:  Any "first degree relatives" (parent, brother, sister, or child) with the following? Skin Cancer, Pancreatic or Other Cancer? No   PATIENT EXPERIENCE:    Do you want the Dermatologist to perform a COMPLETE skin exam today including a clinical examination under the "bra and underwear" areas? NO breast and groin not examined   If necessary, do we have your permission to call and leave a detailed message on your Preferred Phone number that includes your specific medical information?   Yes      Allergies   Allergen Reactions    Nitrofurantoin Hives    Shrimp Extract Allergy Skin Test - Food Allergy Hives     Scratchy throat     Pneumococcal Vaccine Other (See Comments) Shellfish Allergy - Food Allergy Hives    Sulfa Antibiotics Hives, Itching and Lip Swelling      Current Outpatient Medications:     ascorbic acid (VITAMIN C) 500 MG tablet, Take 500 mg by mouth, Disp: , Rfl:     atorvastatin (LIPITOR) 10 mg tablet, TAKE 1 TABLET BY MOUTH EVERY DAY, Disp: 90 tablet, Rfl: 1    cholecalciferol (VITAMIN D3) 400 units tablet, Take 400 Units by mouth, Disp: , Rfl:     clobetasol (TEMOVATE) 0.05 % ointment, Apply topically 2 (two) times a day, Disp: 45 g, Rfl: 3    Echinacea 125 MG CAPS, Take by mouth, Disp: , Rfl:     glucosamine-chondroitin 500-400 MG tablet, Take 2 tablets by mouth , Disp: , Rfl:     hydrochlorothiazide (HYDRODIURIL) 25 mg tablet, TAKE 1 TABLET (25 MG TOTAL) BY MOUTH DAILY. , Disp: 90 tablet, Rfl: 1    ketoconazole (NIZORAL) 2 % cream, Apply topically daily, Disp: 30 g, Rfl: 3    LORazepam (Ativan) 0.5 mg tablet, Take 1 tablet (0.5 mg total) by mouth every 8 (eight) hours as needed for anxiety, Disp: 60 tablet, Rfl: 1    metoprolol succinate (TOPROL-XL) 25 mg 24 hr tablet, Take 1 tablet (25 mg total) by mouth daily, Disp: 90 tablet, Rfl: 0    Multiple Vitamins-Minerals (MULTIVITAMIN ADULT EXTRA C PO), Take 1 tablet by mouth, Disp: , Rfl:     valsartan (DIOVAN) 160 mg tablet, TAKE 1 TABLET BY MOUTH EVERY DAY, Disp: 90 tablet, Rfl: 1          Whom besides the patient is providing clinical information about today's encounter? NO ADDITIONAL HISTORIAN (patient alone provided history)    Physical Exam and Assessment/Plan by Diagnosis:    Patient here for skin exam, also here for rash on toes. MELANOCYTIC NEVI ("Moles")    Physical Exam:  Anatomic Location Affected:   Mostly on sun-exposed areas of the trunk and extremities  Morphological Description:  Scattered, 1-4mm round to ovoid, symmetrical-appearing, even bordered, skin colored to dark brown macules/papules, mostly in sun-exposed areas  Pertinent Positives:  Pertinent Negatives:     Additional History of Present Condition:      Assessment and Plan:  Based on a thorough discussion of this condition and the management approach to it (including a comprehensive discussion of the known risks, side effects and potential benefits of treatment), the patient (family) agrees to implement the following specific plan:  When outside we recommend using a wide brim hat, sunglasses, long sleeve and pants, sunscreen with SPF 11+ with reapplication every 2 hours, or SPF specific clothing   Benign, reassured  Annual skin check     Melanocytic Nevi  Melanocytic nevi ("moles") are tan or brown, raised or flat areas of the skin which have an increased number of melanocytes. Melanocytes are the cells in our body which make pigment and account for skin color. Some moles are present at birth (I.e., "congenital nevi"), while others come up later in life (i.e., "acquired nevi"). The sun can stimulate the body to make more moles. Sunburns are not the only thing that triggers more moles. Chronic sun exposure can do it too. Clinically distinguishing a healthy mole from melanoma may be difficult, even for experienced dermatologists. The "ABCDE's" of moles have been suggested as a means of helping to alert a person to a suspicious mole and the possible increased risk of melanoma. The suggestions for raising alert are as follows:    Asymmetry: Healthy moles tend to be symmetric, while melanomas are often asymmetric. Asymmetry means if you draw a line through the mole, the two halves do not match in color, size, shape, or surface texture. Asymmetry can be a result of rapid enlargement of a mole, the development of a raised area on a previously flat lesion, scaling, ulceration, bleeding or scabbing within the mole. Any mole that starts to demonstrate "asymmetry" should be examined promptly by a board certified dermatologist.     Border: Healthy moles tend to have discrete, even borders.   The border of a melanoma often blends into the normal skin and does not sharply delineate the mole from normal skin. Any mole that starts to demonstrate "uneven borders" should be examined promptly by a board certified dermatologist.     Color: Healthy moles tend to be one color throughout. Melanomas tend to be made up of different colors ranging from dark black, blue, white, or red. Any mole that demonstrates a color change should be examined promptly by a board certified dermatologist.     Diameter: Healthy moles tend to be smaller than 0.6 cm in size; an exception are "congenital nevi" that can be larger. Melanomas tend to grow and can often be greater than 0.6 cm (1/4 of an inch, or the size of a pencil eraser). This is only a guideline, and many normal moles may be larger than 0.6 cm without being unhealthy. Any mole that starts to change in size (small to bigger or bigger to smaller) should be examined promptly by a board certified dermatologist.     Evolving: Healthy moles tend to "stay the same."  Melanomas may often show signs of change or evolution such as a change in size, shape, color, or elevation. Any mole that starts to itch, bleed, crust, burn, hurt, or ulcerate or demonstrate a change or evolution should be examined promptly by a board certified dermatologist.        LENTIGO    Physical Exam:  Anatomic Location Affected:  trunk, arms  Morphological Description:  Light brown macules  Pertinent Positives:  Pertinent Negatives: Additional History of Present Condition:      Assessment and Plan:  Based on a thorough discussion of this condition and the management approach to it (including a comprehensive discussion of the known risks, side effects and potential benefits of treatment), the patient (family) agrees to implement the following specific plan:  When outside we recommend using a wide brim hat, sunglasses, long sleeve and pants, sunscreen with SPF 34+ with reapplication every 2 hours, or SPF specific clothing       What is a lentigo?   A lentigo is a pigmented flat or slightly raised lesion with a clearly defined edge. Unlike an ephelis (freckle), it does not fade in the winter months. There are several kinds of lentigo. The name lentigo originally referred to its appearance resembling a small lentil. The plural of lentigo is lentigines, although “lentigos” is also in common use. Who gets lentigines? Lentigines can affect males and females of all ages and races. Solar lentigines are especially prevalent in fair skinned adults. Lentigines associated with syndromes are present at birth or arise during childhood. What causes lentigines? Common forms of lentigo are due to exposure to ultraviolet radiation:  Sun damage including sunburn   Indoor tanning   Phototherapy, especially photochemotherapy (PUVA)    Ionizing radiation, eg radiation therapy, can also cause lentigines. Several familial syndromes associated with widespread lentigines originate from mutations in Quentin-MAP kinase, mTOR signaling and PTEN pathways. What is the treatment for lentigines? Most lentigines are left alone. Attempts to lighten them may not be successful. The following approaches are used:  SPF 50+ broad-spectrum sunscreen   Hydroquinone bleaching cream   Alpha hydroxy acids   Vitamin C   Retinoids   Azelaic acid   Chemical peels  Individual lesions can be permanently removed using:  Cryotherapy   Intense pulsed light   Pigment lasers    How can lentigines be prevented? Lentigines associated with exposure ultraviolet radiation can be prevented by very careful sun protection. Clothing is more successful at preventing new lentigines than are sunscreens. What is the outlook for lentigines? Lentigines usually persist. They may increase in number with age and sun exposure. Some in sun-protected sites may fade and disappear. CHERRY ANGIOMAS    Physical Exam:  Anatomic Location Affected:  trunk  Morphological Description:  Scattered cherry red, 1-4 mm papules.   Pertinent Positives:  Pertinent Negatives: Additional History of Present Condition:      Assessment and Plan:  Based on a thorough discussion of this condition and the management approach to it (including a comprehensive discussion of the known risks, side effects and potential benefits of treatment), the patient (family) agrees to implement the following specific plan:  Monitor for changes  Benign, reassured      Assessment and Plan:    Cherry angioma, also known as Jian Flakes spots, are benign vascular skin lesions. A "cherry angioma" is a firm red, blue or purple papule, 0.1-1 cm in diameter. When thrombosed, they can appear black in colour until evaluated with a dermatoscope when the red or purple colour is more easily seen. Cherry angioma may develop on any part of the body but most often appear on the scalp, face, lips and trunk. An angioma is due to proliferating endothelial cells; these are the cells that line the inside of a blood vessel. Angiomas can arise in early life or later in life; the most common type of angioma is a cherry angioma. Cherry angiomas are very common in males and females of any age or race. They are more noticeable in white skin than in skin of colour. They markedly increase in number from about the age of 36. There may be a family history of similar lesions. Eruptive cherry angiomas have been rarely reported to be associated with internal malignancy. The cause of angiomas is unknown. Genetic analysis of cherry angiomas has shown that they frequently carry specific somatic missense mutations in the GNAQ and GNA11 (Q209H) genes, which are involved in other vascular and melanocytic proliferations. XEROSIS ("DRY SKIN")    Physical Exam:  Anatomic Location Affected:  diffuse  Morphological Description:  xerosis  Pertinent Positives:  Pertinent Negatives:     Additional History of Present Condition:      Assessment and Plan:  Based on a thorough discussion of this condition and the management approach to it (including a comprehensive discussion of the known risks, side effects and potential benefits of treatment), the patient (family) agrees to implement the following specific plan:  Use moisturizer like Eucerin,Cerave or Aveeno Cream 3 times a day for the dry skin            Dry skin refers to skin that feels dry to touch. Dry skin has a dull surface with a rough, scaly quality. The skin is less pliable and cracked. When dryness is severe, the skin may become inflamed and fissured. Although any body site can be dry, dry skin tends to affect the shins more than any other site. Dry skin is lacking moisture in the outer horny cell layer (stratum corneum) and this results in cracks in the skin surface. Dry skin is also called xerosis, xeroderma or asteatosis (lack of fat). It can affect males and females of all ages. There is some racial variability in water and lipid content of the skin. Dry skin that starts in early childhood may be one of about 20 types of ichthyosis (fish-scale skin). There is often a family history of dry skin. Dry skin is commonly seen in people with atopic dermatitis. Nearly everyone > 60 years has dry skin. Dry skin that begins later may be seen in people with certain diseases and conditions. Postmenopausal women  Hypothyroidism  Chronic renal disease   Malnutrition and weight loss   Subclinical dermatitis   Treatment with certain drugs such as oral retinoids, diuretics and epidermal growth factor receptor inhibitors      What is the treatment for dry skin? The mainstay of treatment of dry skin and ichthyosis is moisturisers/emollients. They should be applied liberally and often enough to:  Reduce itch   Improve the barrier function   Prevent entry of irritants, bacteria   Reduce transepidermal water loss. How can dry skin be prevented? Eliminate aggravating factors:  Reduce the frequency of bathing.    A humidifier in winter and air conditioner in summer   Compare having a short shower with a prolonged soak in a bath. Use lukewarm, not hot, water. Replace standard soap with a substitute such as a synthetic detergent cleanser, water-miscible emollient, bath oil, anti-pruritic tar oil, colloidal oatmeal etc.   Apply an emollient liberally and often, particularly shortly after bathing, and when itchy. The drier the skin, the thicker this should be, especially on the hands. What is the outlook for dry skin? A tendency to dry skin may persist life-long, or it may improve once contributing factors are controlled. VERRUCA VULGARIS ("Common Warts")    Physical Exam:  Anatomic Location Affected:  left heel   Morphological Description:  verrucous papule   Pertinent Positives:  Pertinent Negatives: Additional History of Present Condition:  Patient has been using salycylic acid and then filing wart     Assessment and Plan:  Based on a thorough discussion of this condition and the management approach to it (including a comprehensive discussion of the known risks, side effects and potential benefits of treatment), the patient (family) agrees to implement the following specific plan:   Continue over the counter 38% salicylic acid pads should be applied at bedtime. In the morning remove pad and file each wart with a dedicated nail file or pumice stone. Do not use the same file or stone on any other family member or other unaffected location      Verruca Vulgaris  A verruca is a common growth of the skin caused by infection by human papilloma virus (HPV). There are many strains of the virus that cause different types of warts on the body. The virus infects the most superficial layers of the skin, causing increased production of skin cells and thickening. Warts can be spread through direct contact with infected skin and may spread to other parts of the body if scratched or picked.      A verruca is more commonly called a "wart." Warts are particularly common in school-aged children but can arise at any age. Patients who have a history of eczema are especially prone due to impaired skin barrier. Those taking immunosuppressive drugs or with HIV infections may experience prolonged symptoms despite treatment. Warts generally have a rough surface with a tiny black dot sometimes observed in the middle of each scaly spot. They can range in size from a small bump to large scaly growths. Common warts are often found on the backs of fingers or toes, around the nails, and on the knees. Plantar warts can grow inwardly on the soles of the feet causing pain. There are many possible ways to treat warts and sometimes several different treatments are needed to get the warts to go away completely. There is no single perfect treatment for warts, and successful treatment can take many months. In-office treatments usually require multiple visits, and include:  Cryotherapy. a cold spray with liquid nitrogen will destroy the infected cells but may lead to discomfort and blistering. It may also leave a permanent white shiloh or scar. Electrosurgery (curettage and cautery) can be used for large resistant warts which involves shaving the growth down and burning the base. It is performed under local anesthesia and may leave a permanent scar    Candida (“yeast”) antigen injections. These are extracts of the common yeast (Candida) that cannot cause an infection. The medication is injected into/under the wart. It is thought to stimulate the immune system to recognize the wart virus and attack it. Multiple injections are often needed about one month apart. There are also several at-home wart treatments:    Soak the warts in warm water for 5 minutes every night followed by gentle filing with a nail file or pumice stone.     Topical salicylic acid or similar compounds work by removing the dead surface skin cells  Apply the medicine directly to the wart, wait for it to dry completely, then cover with duct tape overnight   Repeat until the wart is gone, which can take 2-4 months  Do not use on the face or groin area   If the wart paint makes the skin sore, stop treatment until the discomfort has settled, then recommence as above. Take care to keep the chemical off normal skin. Podophyllin is a cytotoxic agent used in some products and must not be used in pregnancy or women considering pregnancy. Some prescription medications include   Topical retinoids (adapalene, tretinoin, tazarotene), 5-fluorouracil (Efudex) or imiquimod (Aldara) creams are sometimes used to treat flat warts or warts on the face and other sensitive anatomical areas. They are usually applied directly to the warts once a day for 2-4 months and can be irritating. These treatments should only be used as directed by your health care provider. Systemic treatment with oral cimetidine (Tagamet) may help boost the immune system against the wart virus in patients, some of the time. Initiation of cimetidine therapy should ONLY be done under the supervision of your health care provider, who can discuss possible side effects and drug-to-drug interactions of this specific treatment. DERMATOFIBROMA    Physical Exam:  Anatomic Location Affected:  left leg   Morphological Description:  firm brown papules   Pertinent Positives:  Pertinent Negatives: Additional History of Present Condition:      Assessment and Plan:  Based on a thorough discussion of this condition and the management approach to it (including a comprehensive discussion of the known risks, side effects and potential benefits of treatment), the patient (family) agrees to implement the following specific plan:  Reassured benign     Assessment and Plan:  A dermatofibroma is a common benign fibrous nodule that most often arises on the skin of the lower legs.   A dermatofibroma is also called a "cutaneous fibrous histiocytoma."  Dermatofibromas occur at all ages and in people of every ethnicity. They are more common in women than in men. It is not clear if dermatofibroma is a reactive process or if it is a neoplasm. The lesions are made up of proliferating fibroblasts. Histiocytes may also be involved. They are sometimes attributed to an insect bite or ingrownhair or local trauma, but not consistently. They may be more numerous in patients with altered immunity. Dermatofibromas most often occur on the legs and arms, but may also arise on the trunk or any site of the body. Typical clinical features include the following:  People may have 1 or up to 15 lesions. Size varies from 0.5-1.5 cm diameter; most lesions are 7-10 mm diameter. They are firm nodules tethered to the skin surface and mobile over subcutaneous tissue. The skin "dimples" on pinching the lesion. Color may be pink to light brown in white skin, and dark brown to black in dark skin; some appear paler in the center. They do not usually cause symptoms, but they are sometimes painful or itchy. Because they are often raised lesions, they may be traumatized, for example by a razor. Occasionally dozens may erupt within a few months, usually in the setting of immunosuppression (for example autoimmune disease, cancer or certain medications). Dermatofibroma does not give rise to cancer. However, occasionally, it may be mistaken for dermatofibrosarcoma or desmoplastic melanoma. A dermatofibroma is harmless and seldom causes any symptoms. Usually, only reassurance is needed. If it is nuisance or causing concern, the lesion can be removed surgically, resulting in a scar that is, by definition, usually longer in diameter than the widest portion of the dermatofibroma. Cryotherapy, shave biopsy and laser surgery are rarely completely successful.   Skin punch biopsy or incisional biopsy may be undertaken if there is an atypical feature such as recent enlargement, ulceration, or asymmetrical structures and colours on dermatoscopy. ONYCHOGRYPHOSIS   Physical Exam:  Anatomic Location Affected: MOSTLY ON BILATERAL GREAT TOE NAILS   Morphological Description:  yellowed thickened and slanting   Pertinent Positives:  Pertinent Negatives: Additional History of Present Condition:  Patient here for poss fungus on toes nails, patient was seeing a podiatrist and they did send out nail clipping for fungus and test came back negative, believed it can possibly be psoriasis. Assessment and Plan:  Based on a thorough discussion of this condition and the management approach to it (including a comprehensive discussion of the known risks, side effects and potential benefits of treatment), the patient (family) agrees to implement the following specific plan:   With nails moist and soft can try grinding them gently   Moisturize feet with Urea 40 % cream     Scribe Attestation      I,:  Trang Nails MA am acting as a scribe while in the presence of the attending physician.:       I,:  Martni Solorzano MD personally performed the services described in this documentation    as scribed in my presence.:

## 2023-11-13 ENCOUNTER — HOSPITAL ENCOUNTER (OUTPATIENT)
Dept: NON INVASIVE DIAGNOSTICS | Facility: HOSPITAL | Age: 68
Discharge: HOME | End: 2023-11-13
Payer: COMMERCIAL

## 2023-11-13 VITALS
HEART RATE: 69 BPM | DIASTOLIC BLOOD PRESSURE: 78 MMHG | HEIGHT: 61 IN | SYSTOLIC BLOOD PRESSURE: 128 MMHG | BODY MASS INDEX: 24.92 KG/M2 | WEIGHT: 132 LBS

## 2023-11-13 DIAGNOSIS — R00.2 PALPITATIONS: ICD-10-CM

## 2023-11-13 DIAGNOSIS — R94.31 ABNORMAL EKG: ICD-10-CM

## 2023-11-13 LAB
AORTIC ROOT: 2.5 CM
APICAL FOUR CHAMBER EJECTION FRACTION: 65 %
ASCENDING AORTA: 2.6 CM
E WAVE DECELERATION TIME: 225 MS
E/A RATIO: 0.82
FRACTIONAL SHORTENING: 36 (ref 28–44)
INTERVENTRICULAR SEPTUM IN DIASTOLE (PARASTERNAL SHORT AXIS VIEW): 1 CM
INTERVENTRICULAR SEPTUM: 1 CM (ref 0.6–1.1)
LAAS-AP2: 17 CM2
LAAS-AP4: 11.9 CM2
LEFT ATRIUM SIZE: 3.6 CM
LEFT ATRIUM VOLUME (MOD BIPLANE): 39 ML
LEFT ATRIUM VOLUME INDEX (MOD BIPLANE): 24.7 ML/M2
LEFT INTERNAL DIMENSION IN SYSTOLE: 2.3 CM (ref 2.1–4)
LEFT VENTRICULAR INTERNAL DIMENSION IN DIASTOLE: 3.6 CM (ref 3.5–6)
LEFT VENTRICULAR POSTERIOR WALL IN END DIASTOLE: 1 CM
LEFT VENTRICULAR STROKE VOLUME: 37 ML
LVSV (TEICH): 37 ML
MV E'TISSUE VEL-SEP: 8 CM/S
MV PEAK A VEL: 0.98 M/S
MV PEAK E VEL: 80 CM/S
MV STENOSIS PRESSURE HALF TIME: 65 MS
MV VALVE AREA P 1/2 METHOD: 3.38
RA PRESSURE ESTIMATED: 8 MMHG
RIGHT ATRIUM AREA SYSTOLE A4C: 10.3 CM2
RIGHT VENTRICLE ID DIMENSION: 3 CM
RV PSP: 31 MMHG
SL CV LEFT ATRIUM LENGTH A2C: 5 CM
SL CV LV EF: 60
SL CV PED ECHO LEFT VENTRICLE DIASTOLIC VOLUME (MOD BIPLANE) 2D: 55 ML
SL CV PED ECHO LEFT VENTRICLE SYSTOLIC VOLUME (MOD BIPLANE) 2D: 18 ML
TR MAX PG: 23 MMHG
TR PEAK VELOCITY: 2.4 M/S
TRICUSPID ANNULAR PLANE SYSTOLIC EXCURSION: 2.1 CM
TRICUSPID VALVE PEAK REGURGITATION VELOCITY: 2.4 M/S

## 2023-11-13 PROCEDURE — 93306 TTE W/DOPPLER COMPLETE: CPT

## 2023-11-13 PROCEDURE — 93306 TTE W/DOPPLER COMPLETE: CPT | Performed by: INTERNAL MEDICINE

## 2023-12-18 PROBLEM — Z00.00 MEDICARE ANNUAL WELLNESS VISIT, SUBSEQUENT: Status: RESOLVED | Noted: 2021-08-06 | Resolved: 2023-12-18

## 2023-12-21 ENCOUNTER — HOSPITAL ENCOUNTER (OUTPATIENT)
Dept: RADIOLOGY | Age: 68
Discharge: HOME | End: 2023-12-21
Payer: COMMERCIAL

## 2023-12-21 VITALS — WEIGHT: 133 LBS | BODY MASS INDEX: 25.11 KG/M2 | HEIGHT: 61 IN

## 2023-12-21 DIAGNOSIS — Z12.31 VISIT FOR SCREENING MAMMOGRAM: ICD-10-CM

## 2023-12-21 PROCEDURE — 77067 SCR MAMMO BI INCL CAD: CPT

## 2023-12-21 PROCEDURE — 77063 BREAST TOMOSYNTHESIS BI: CPT

## 2023-12-29 ENCOUNTER — OFFICE VISIT (OUTPATIENT)
Dept: CARDIOLOGY CLINIC | Facility: MEDICAL CENTER | Age: 68
End: 2023-12-29
Payer: COMMERCIAL

## 2023-12-29 VITALS
WEIGHT: 135 LBS | HEIGHT: 61 IN | HEART RATE: 78 BPM | BODY MASS INDEX: 25.49 KG/M2 | SYSTOLIC BLOOD PRESSURE: 138 MMHG | OXYGEN SATURATION: 98 % | DIASTOLIC BLOOD PRESSURE: 80 MMHG

## 2023-12-29 DIAGNOSIS — I49.3 PVC'S (PREMATURE VENTRICULAR CONTRACTIONS): ICD-10-CM

## 2023-12-29 DIAGNOSIS — I10 ESSENTIAL HYPERTENSION: ICD-10-CM

## 2023-12-29 DIAGNOSIS — R94.31 ABNORMAL EKG: ICD-10-CM

## 2023-12-29 DIAGNOSIS — R00.2 PALPITATIONS: ICD-10-CM

## 2023-12-29 DIAGNOSIS — I47.29 NSVT (NONSUSTAINED VENTRICULAR TACHYCARDIA) (HCC): ICD-10-CM

## 2023-12-29 DIAGNOSIS — E78.2 MIXED HYPERLIPIDEMIA: Primary | ICD-10-CM

## 2023-12-29 PROCEDURE — 99203 OFFICE O/P NEW LOW 30 MIN: CPT | Performed by: INTERNAL MEDICINE

## 2023-12-29 NOTE — PROGRESS NOTES
Cardiology Consultation     Pedro Avila  [id number]  1955  [facility] [place]  [address] [place] [address]      Diagnoses and all orders for this visit:    Mixed hyperlipidemia    Palpitations  -     Ambulatory Referral to Cardiology    Abnormal EKG  -     Ambulatory Referral to Cardiology    Essential hypertension      I had the pleasure of seeing Pedro Avila for a consultation regarding palpitaitons and PVCs requested by     History of the Presenting Illness, Discussion/Summary and my Plan are as follows:::    Cherelle Abdullahi is a pleasant 68-year-old lady with a history of hypertension and dyslipidemia-both treated, no tobacco use, no diabetes, no personal history of cardiac disease, who had sought attention for palpitations in the setting of increased family stress, leading to a 24-hour Holter that showed high PVC burden-10% of total beats, with short run of nonsustained VT leading to institution of metoprolol-currently at a dose of 25 mg daily.    Her family stressors have also decreased and with institution of metoprolol as well, her palpitations completely resolved.    She regularly exercises-80 minutes, slow jog, stationary bike, without any limitations or any symptoms.    FH: dad - 'massive KOWALSKI' at 83, mom no heart issues, sibs and kids are well  Activity: 80 min of cardio a day - bike or slow jog,no change in physical capacity. No ICDs or arrhythmias    She underwent an echocardiogram that was unremarkable.    Plan:    High PVC burden with run of nonsustained VT: Continue metoprolol without changes, echo without any structural heart disease, check an exercise treadmill stress test to rule out ischemia  Etiology could be from increased stress and catecholamines  Repeat Holter monitoring to reassess PVC burden  If still significantly high-might need a cardiac MRI, on the other hand if these have  decreased, might hold off on further evaluation  Freq PVCs can be associated with a cardiomyopathy and can result from ischemia    Hypertension: On valsartan and metoprolol, controlled at home, has white coat syndrome (118/70 at home), will cross check at next visit for accuracy    Dyslipidemia: Reasonably controlled although the last 1 was a little higher.    Follow-up in 6 months       Latest Reference Range & Units 10/05/22 09:27 03/31/23 09:04 10/12/23 09:28   Cholesterol See Comment mg/dL 179 184 198   Triglycerides See Comment mg/dL 149 150 174 (H)   HDL >=50 mg/dL 51 58 62   Non-HDL Cholesterol mg/dl 128 126 136   LDL Calculated 0 - 100 mg/dL 98 96 101 (H)   (H): Data is abnormally high   Latest Reference Range & Units 03/31/23 09:04 10/12/23 09:28   Hemoglobin A1C Normal 4.0-5.6%; PreDiabetic 5.7-6.4%; Diabetic >=6.5%; Glycemic control for adults with diabetes <7.0% % 5.8 (H) 5.9 (H)   eAG, EST AVG Glucose mg/dl 120 123   (H): Data is abnormally high   Latest Reference Range & Units 03/31/23 09:04 10/12/23 09:28   Potassium 3.5 - 5.3 mmol/L 3.8 4.5     1. Mixed hyperlipidemia        2. Palpitations  Ambulatory Referral to Cardiology      3. Abnormal EKG  Ambulatory Referral to Cardiology      4. Essential hypertension          Patient Active Problem List   Diagnosis    Essential hypertension    Mixed hyperlipidemia    Primary osteoarthritis involving multiple joints    Eczema    Biceps tendinitis of right shoulder    Need for vaccination    Osteoporosis    Arthritis of right glenohumeral joint    Elevated fasting blood sugar    Prediabetes    Left foot pain    Rash    Palpitations    Anxiety     Past Medical History:   Diagnosis Date    Allergic 2010    Hive reaction to Macrodantin, 2014 - reaction to shrimp    Arthritis 2012?    take Meloxicam, Glucosamine & Chondroitin    Hypertension 2000?    take hydrochlorothiazide, Valsartan & Atorvastatin    Urinary tract infection 1975    Occasional bladder  infections throughout adulthood     Social History     Socioeconomic History    Marital status: /Civil Union     Spouse name: Not on file    Number of children: Not on file    Years of education: Not on file    Highest education level: Not on file   Occupational History    Not on file   Tobacco Use    Smoking status: Never    Smokeless tobacco: Never   Vaping Use    Vaping status: Never Used   Substance and Sexual Activity    Alcohol use: Yes     Alcohol/week: 1.0 standard drink of alcohol     Types: 1 Glasses of wine per week     Comment: Very limited. Less than 1 per week.    Drug use: Never    Sexual activity: Not Currently     Partners: Male     Birth control/protection: Post-menopausal, None   Other Topics Concern    Not on file   Social History Narrative    Not on file     Social Determinants of Health     Financial Resource Strain: Low Risk  (10/16/2023)    Overall Financial Resource Strain (CARDIA)     Difficulty of Paying Living Expenses: Not hard at all   Food Insecurity: Not on file   Transportation Needs: No Transportation Needs (10/16/2023)    PRAPARE - Transportation     Lack of Transportation (Medical): No     Lack of Transportation (Non-Medical): No   Physical Activity: Not on file   Stress: Not on file   Social Connections: Not on file   Intimate Partner Violence: Not on file   Housing Stability: Not on file      Family History   Problem Relation Age of Onset    Depression Mother         Bi-polar    Cancer Mother         Non-small cell lung cancer (non-smoker)    Bipolar disorder Mother     Dementia Maternal Grandmother     Dementia Maternal Uncle     Hypertension Father     Heart disease Father     Hearing loss Father     Hypertension Brother     Heart disease Maternal Grandfather     Diabetes Maternal Grandfather     Completed Suicide  Maternal Uncle     No Known Problems Daughter     No Known Problems Daughter     No Known Problems Paternal Aunt      Past Surgical History:   Procedure  "Laterality Date     SECTION  1980    EYE SURGERY  2020    cataract surgery R eye    HYSTERECTOMY  2000    OOPHORECTOMY      PAROTIDECTOMY         Current Outpatient Medications:     ascorbic acid (VITAMIN C) 500 MG tablet, Take 500 mg by mouth, Disp: , Rfl:     atorvastatin (LIPITOR) 10 mg tablet, TAKE 1 TABLET BY MOUTH EVERY DAY, Disp: 90 tablet, Rfl: 1    cholecalciferol (VITAMIN D3) 400 units tablet, Take 400 Units by mouth, Disp: , Rfl:     clobetasol (TEMOVATE) 0.05 % ointment, Apply topically 2 (two) times a day, Disp: 45 g, Rfl: 3    Echinacea 125 MG CAPS, Take by mouth, Disp: , Rfl:     glucosamine-chondroitin 500-400 MG tablet, Take 2 tablets by mouth , Disp: , Rfl:     hydrochlorothiazide (HYDRODIURIL) 25 mg tablet, TAKE 1 TABLET (25 MG TOTAL) BY MOUTH DAILY., Disp: 90 tablet, Rfl: 1    ketoconazole (NIZORAL) 2 % cream, Apply topically daily, Disp: 30 g, Rfl: 3    LORazepam (Ativan) 0.5 mg tablet, Take 1 tablet (0.5 mg total) by mouth every 8 (eight) hours as needed for anxiety, Disp: 60 tablet, Rfl: 1    metoprolol succinate (TOPROL-XL) 25 mg 24 hr tablet, Take 1 tablet (25 mg total) by mouth daily, Disp: 90 tablet, Rfl: 0    Multiple Vitamins-Minerals (MULTIVITAMIN ADULT EXTRA C PO), Take 1 tablet by mouth, Disp: , Rfl:     valsartan (DIOVAN) 160 mg tablet, TAKE 1 TABLET BY MOUTH EVERY DAY, Disp: 90 tablet, Rfl: 1  Allergies   Allergen Reactions    Nitrofurantoin Hives    Shrimp Extract Allergy Skin Test - Food Allergy Hives     Scratchy throat     Pneumococcal Vaccine Other (See Comments)    Shellfish Allergy - Food Allergy Hives    Sulfa Antibiotics Hives, Itching and Lip Swelling     Vitals:    23 0945   BP: 138/80   BP Location: Left arm   Patient Position: Sitting   Cuff Size: Adult   Pulse: 78   SpO2: 98%   Weight: 61.2 kg (135 lb)   Height: 5' 1\" (1.549 m)         Imaging: Mammo screening bilateral w 3d & cad    Result Date: 2023  Narrative: DIAGNOSIS: Visit for " screening mammogram TECHNIQUE: Digital screening mammography was performed. Computer Aided Detection (CAD) analyzed all applicable images. COMPARISONS: Prior breast imaging dated: 12/20/2022, 09/09/2021, 09/02/2021, 09/07/2019, 09/05/2018, 07/13/2017, 02/17/2016, and 11/25/2014 RELEVANT HISTORY: Family Breast Cancer History: No known family history of breast cancer. Family Medical History: No known relevant family medical history. Personal History: Hormone history includes birth control and estrogen replacement therapy. Surgical history includes hysterectomy and oophorectomy. No known relevant medical history. The patient is scheduled in a reminder system for screening mammography. 8-10% of cancers will be missed on mammography. Management of a palpable abnormality must be based on clinical grounds.  Patients will be notified of their results via letter from our facility. Accredited by American College of Radiology and FDA. RISK ASSESSMENT: 5 Year Tyrer-Cuzick: 1.04% 10 Year Tyrer-Cuzick: 2.21% Lifetime Tyrer-Cuzick: 3.51% TISSUE DENSITY: There are scattered areas of fibroglandular density. INDICATION: Pedro Avila is a 68 y.o. female presenting for screening mammography. FINDINGS: Bilateral There are no suspicious masses, grouped microcalcifications or areas of unexplained architectural distortion. The skin and nipple areolar complex are unremarkable.  There are scattered calcifications present.     Impression: No mammographic evidence of malignancy. ASSESSMENT/BI-RADS CATEGORY: Left: 2 - Benign Right: 2 - Benign Overall: 2 - Benign RECOMMENDATION:      - Routine screening mammogram in 1 year for both breasts. Workstation ID: [id number]       Review of Systems:  Review of Systems   Constitutional: Negative.    HENT: Negative.     Eyes: Negative.    Respiratory: Negative.     Cardiovascular:  Positive for palpitations.   Endocrine: Negative.    Musculoskeletal: Negative.        Physical Exam:    /80 (BP  "Location: Left arm, Patient Position: Sitting, Cuff Size: Adult)   Pulse 78   Ht 5' 1\" (1.549 m)   Wt 61.2 kg (135 lb)   SpO2 98%   BMI 25.51 kg/m²   Physical Exam  Constitutional:       General: She is not in acute distress.     Appearance: She is not ill-appearing, toxic-appearing or diaphoretic.   HENT:      Nose: Nose normal. No congestion or rhinorrhea.      Mouth/Throat:      Mouth: Mucous membranes are moist.   Neck:      Vascular: No carotid bruit.   Cardiovascular:      Rate and Rhythm: Normal rate and regular rhythm.      Heart sounds: No murmur heard.     No friction rub. No gallop.   Pulmonary:      Effort: Pulmonary effort is normal. No respiratory distress.      Breath sounds: No stridor. No wheezing or rhonchi.   Abdominal:      General: Abdomen is flat. Bowel sounds are normal. There is no distension.      Palpations: There is no mass.      Tenderness: There is no abdominal tenderness.      Hernia: No hernia is present.   Musculoskeletal:         General: No swelling, tenderness, deformity or signs of injury. Normal range of motion.      Cervical back: Normal range of motion. No rigidity or tenderness.   Lymphadenopathy:      Cervical: No cervical adenopathy.   Neurological:      Mental Status: She is alert.            This note was completed in part utilizing GT Nexus direct voice recognition software.   Grammatical errors, random word insertion, spelling mistakes, occasional wrong word or \"sound-alike\" substitutions and incomplete sentences may be an occasional consequence of the system secondary to software limitations, ambient noise and hardware issues. At the time of dictation, efforts were made to edit, clarify and /or correct errors.  Please read the chart carefully and recognize, using context, where substitutions have occurred.  If you have any questions or concerns about the context, text or information contained within the body of this dictation, please contact myself, the " provider, for further clarification.

## 2024-01-02 ENCOUNTER — TELEPHONE (OUTPATIENT)
Dept: FAMILY MEDICINE CLINIC | Facility: CLINIC | Age: 69
End: 2024-01-02

## 2024-01-02 NOTE — TELEPHONE ENCOUNTER
----- Message from Maru West MD sent at 12/30/2023 11:04 AM EST -----  Please call patient with normal results.

## 2024-01-03 ENCOUNTER — HOSPITAL ENCOUNTER (OUTPATIENT)
Dept: NON INVASIVE DIAGNOSTICS | Facility: HOSPITAL | Age: 69
Discharge: HOME | End: 2024-01-03
Attending: INTERNAL MEDICINE
Payer: COMMERCIAL

## 2024-01-03 DIAGNOSIS — I10 ESSENTIAL HYPERTENSION: ICD-10-CM

## 2024-01-03 DIAGNOSIS — R00.2 PALPITATIONS: ICD-10-CM

## 2024-01-03 DIAGNOSIS — I47.29 NSVT (NONSUSTAINED VENTRICULAR TACHYCARDIA) (HCC): ICD-10-CM

## 2024-01-03 DIAGNOSIS — I49.3 PVC'S (PREMATURE VENTRICULAR CONTRACTIONS): ICD-10-CM

## 2024-01-03 PROCEDURE — 93225 XTRNL ECG REC<48 HRS REC: CPT

## 2024-01-03 PROCEDURE — 93226 XTRNL ECG REC<48 HR SCAN A/R: CPT

## 2024-01-09 PROCEDURE — 93227 XTRNL ECG REC<48 HR R&I: CPT | Performed by: INTERNAL MEDICINE

## 2024-01-12 ENCOUNTER — HOSPITAL ENCOUNTER (OUTPATIENT)
Dept: NON INVASIVE DIAGNOSTICS | Facility: HOSPITAL | Age: 69
Discharge: HOME | End: 2024-01-12
Attending: INTERNAL MEDICINE
Payer: COMMERCIAL

## 2024-01-12 VITALS
WEIGHT: 135 LBS | OXYGEN SATURATION: 98 % | BODY MASS INDEX: 25.49 KG/M2 | HEART RATE: 81 BPM | DIASTOLIC BLOOD PRESSURE: 80 MMHG | RESPIRATION RATE: 16 BRPM | SYSTOLIC BLOOD PRESSURE: 122 MMHG | HEIGHT: 61 IN

## 2024-01-12 DIAGNOSIS — I47.29 NSVT (NONSUSTAINED VENTRICULAR TACHYCARDIA) (HCC): ICD-10-CM

## 2024-01-12 DIAGNOSIS — I49.3 PVC'S (PREMATURE VENTRICULAR CONTRACTIONS): ICD-10-CM

## 2024-01-12 DIAGNOSIS — R94.31 ABNORMAL EKG: ICD-10-CM

## 2024-01-12 DIAGNOSIS — R00.2 PALPITATIONS: ICD-10-CM

## 2024-01-12 LAB
CHEST PAIN STATEMENT: NORMAL
MAX DIASTOLIC BP: 80 MMHG
MAX HR PERCENT: 100 %
MAX HR: 151 BPM
MAX PREDICTED HEART RATE: 150 BPM
PROTOCOL NAME: NORMAL
RATE PRESSURE PRODUCT: NORMAL
REASON FOR TERMINATION: NORMAL
SL CV STRESS RECOVERY BP: NORMAL MMHG
SL CV STRESS RECOVERY HR: 92 BPM
SL CV STRESS RECOVERY O2 SAT: 98 %
STRESS ANGINA INDEX: 0
STRESS BASELINE BP: NORMAL MMHG
STRESS BASELINE HR: 81 BPM
STRESS DUKE TREADMILL SCORE: 2
STRESS O2 SAT REST: 98 %
STRESS PEAK HR: 151 BPM
STRESS POST ESTIMATED WORKLOAD: 10.1 METS
STRESS POST EXERCISE DUR MIN: 9 MIN
STRESS POST EXERCISE DUR MIN: 9 MIN
STRESS POST EXERCISE DUR SEC: 1 SEC
STRESS POST EXERCISE DUR SEC: 1 SEC
STRESS POST O2 SAT PEAK: 98 %
STRESS POST PEAK BP: 160 MMHG
STRESS POST PEAK HR: 151 BPM
STRESS POST PEAK SYSTOLIC BP: 170 MMHG
STRESS ST DEPRESSION: 1.5 MM
TARGET HR FORMULA: NORMAL
TEST INDICATION: NORMAL

## 2024-01-12 PROCEDURE — 93016 CV STRESS TEST SUPVJ ONLY: CPT | Performed by: INTERNAL MEDICINE

## 2024-01-12 PROCEDURE — 93018 CV STRESS TEST I&R ONLY: CPT | Performed by: INTERNAL MEDICINE

## 2024-01-12 PROCEDURE — 93017 CV STRESS TEST TRACING ONLY: CPT

## 2024-01-15 ENCOUNTER — TELEPHONE (OUTPATIENT)
Dept: GASTROENTEROLOGY | Facility: AMBULARY SURGERY CENTER | Age: 69
End: 2024-01-15

## 2024-01-15 ENCOUNTER — CONSULT (OUTPATIENT)
Dept: GASTROENTEROLOGY | Facility: AMBULARY SURGERY CENTER | Age: 69
End: 2024-01-15
Payer: COMMERCIAL

## 2024-01-15 VITALS
HEIGHT: 61 IN | BODY MASS INDEX: 25.9 KG/M2 | DIASTOLIC BLOOD PRESSURE: 82 MMHG | HEART RATE: 71 BPM | WEIGHT: 137.2 LBS | OXYGEN SATURATION: 98 % | SYSTOLIC BLOOD PRESSURE: 136 MMHG

## 2024-01-15 DIAGNOSIS — Z86.010 HISTORY OF COLON POLYPS: Primary | ICD-10-CM

## 2024-01-15 DIAGNOSIS — Z12.11 SCREEN FOR COLON CANCER: ICD-10-CM

## 2024-01-15 DIAGNOSIS — R94.39 ABNORMAL STRESS TEST: Primary | ICD-10-CM

## 2024-01-15 PROBLEM — Z86.0100 HISTORY OF COLON POLYPS: Status: ACTIVE | Noted: 2024-01-15

## 2024-01-15 PROCEDURE — 99213 OFFICE O/P EST LOW 20 MIN: CPT | Performed by: PHYSICIAN ASSISTANT

## 2024-01-15 NOTE — TELEPHONE ENCOUNTER
Our mutual patient is scheduled for procedure: colonoscopy    On: April 08 , 2024     With: Dr. Sharon Gay MD      Physician Approving clearance:

## 2024-01-15 NOTE — PROGRESS NOTES
[facility] - New Patient Office Visit  Pedro Avila 68 y.o. female MRN: [id number]  Encounter: [id number]          ASSESSMENT AND PLAN:      1. History of colon polyps  -Last colonoscopy in 2018 with polyp removed by appendiceal orifice  -And repeat colonoscopy  - This risks of this procedure include but are not limited to; anesthesia complications, injury/perforation of the bowel, infection and bleeding.      ______________________________________________________________________    Chief Complaint: Tree of colon polyps    HPI: This is a 68-year-old white female with past medical history of hypertension, hyperlipidemia, osteoarthritis, eczema, osteoporosis, prediabetes and anxiety who presents for surveillance colonoscopy.  Her last colonoscopy was in November 2018 with a polyp removed near the epidural orifice.  I do not have the tissue or pathology.    Recently had EKG that showed excessive PVC's.   Recently started on beta blocker from cardiologist (Dr. Blair).  Had holter monitor for a while.   Recent stress test continues to show PVC's    Patient is here for next surveillance colonoscopy.   No family history of colon cancer or inflammatory bowel disease.  No pacemaker or defibrillator implanted.   No chronic kidney disease or solitary kidney.  Not on any supplemental oxygen at night.  Not on any antiplatelet or anticoagulants.    Patient denies any abdominal pain, nausea/vomiting, pain or difficulty swallowing, change in bowels, black or bloody stools    Endoscopy History:  EGD -unknown  Colonoscopy -November 2018 with polyp removed in your appendicular orifice, sigmoid diverticulosis and internal hemorrhoids noted    REVIEW OF SYSTEMS:    CONSTITUTIONAL: Denies any fever, chills, rigors, and weight loss.  HEENT: Denies hearing loss or visual disturbances.  CARDIOVASCULAR: No chest pain or palpitations.   RESPIRATORY: Denies any cough, hemoptysis, shortness of breath or dyspnea on  exertion.  GASTROINTESTINAL: As noted in the History of Present Illness.   GENITOURINARY: No problems with urination. Denies any hematuria or dysuria.  NEUROLOGIC: No dizziness or vertigo, denies headaches.   MUSCULOSKELETAL: Denies any muscle or joint pain.   SKIN: Denies skin rashes or itching.   ENDOCRINE: Denies excessive thirst. Denies intolerance to heat or cold.  PSYCHOSOCIAL: Denies depression or anxiety. Denies any recent memory loss.       Historical Information   Past Medical History:   Diagnosis Date   • Allergic     Hive reaction to Macrodantin,  - reaction to shrimp   • Arthritis ?    take Meloxicam, Glucosamine & Chondroitin   • Hypertension ?    take hydrochlorothiazide, Valsartan & Atorvastatin   • Urinary tract infection 1975    Occasional bladder infections throughout adulthood     Past Surgical History:   Procedure Laterality Date   •  SECTION  1980   • EYE SURGERY  2020    cataract surgery R eye   • HYSTERECTOMY  2000   • OOPHORECTOMY     • PAROTIDECTOMY       Social History   Social History     Substance and Sexual Activity   Alcohol Use Yes   • Alcohol/week: 1.0 standard drink of alcohol   • Types: 1 Glasses of wine per week    Comment: Very limited. Less than 1 per week.     Social History     Substance and Sexual Activity   Drug Use Never     Social History     Tobacco Use   Smoking Status Never   Smokeless Tobacco Never     Family History   Problem Relation Age of Onset   • Depression Mother         Bi-polar   • Cancer Mother         Non-small cell lung cancer (non-smoker)   • Bipolar disorder Mother    • Dementia Maternal Grandmother    • Dementia Maternal Uncle    • Hypertension Father    • Heart disease Father    • Hearing loss Father    • Hypertension Brother    • Heart disease Maternal Grandfather    • Diabetes Maternal Grandfather    • Completed Suicide  Maternal Uncle    • No Known Problems Daughter    • No Known Problems Daughter    • No Known  "Problems Paternal Aunt        Meds/Allergies       Current Outpatient Medications:   •  atorvastatin (LIPITOR) 10 mg tablet  •  cholecalciferol (VITAMIN D3) 400 units tablet  •  clobetasol (TEMOVATE) 0.05 % ointment  •  Echinacea 125 MG CAPS  •  glucosamine-chondroitin 500-400 MG tablet  •  hydrochlorothiazide (HYDRODIURIL) 25 mg tablet  •  ketoconazole (NIZORAL) 2 % cream  •  LORazepam (Ativan) 0.5 mg tablet  •  Multiple Vitamins-Minerals (MULTIVITAMIN ADULT EXTRA C PO)  •  valsartan (DIOVAN) 160 mg tablet  •  ascorbic acid (VITAMIN C) 500 MG tablet  •  metoprolol succinate (TOPROL-XL) 25 mg 24 hr tablet    Allergies   Allergen Reactions   • Nitrofurantoin Hives   • Shrimp Extract Allergy Skin Test - Food Allergy Hives     Scratchy throat    • Pneumococcal Vaccine Other (See Comments)   • Shellfish Allergy - Food Allergy Hives   • Sulfa Antibiotics Hives, Itching and Lip Swelling           Objective     Blood pressure 136/82, pulse 71, height 5' 1\" (1.549 m), weight 62.2 kg (137 lb 3.2 oz), SpO2 98%, not currently breastfeeding. Body mass index is 25.92 kg/m².        PHYSICAL EXAM:      General Appearance:   Alert, cooperative, no distress, appears stated age   HEENT:   Normocephalic, atraumatic, anicteric.     Neck:  Supple, symmetrical   Lungs:   Clear to auscultation bilaterally; no rales, rhonchi or wheezing; respirations unlabored    Heart::   Regular rate and rhythm; no murmur, rub, or gallop.   Abdomen:   Soft, non-tender, non-distended; normal bowel sounds; no masses, no organomegaly    Genitalia:   Deferred    Rectal:   Deferred    Extremities:  No cyanosis, clubbing or edema    Pulses:  Not assessed   Skin:  No jaundice, rashes, or lesions    Lymph nodes:  Not assessed       Lab Results:   No visits with results within 1 Day(s) from this visit.   Latest known visit with results is:   Hospital Outpatient Visit on 01/12/2024   Component Date Value   • Baseline HR 01/12/2024 81    • Baseline BP 01/12/2024 " 122/80    • O2 sat rest 01/12/2024 98    • Stress peak HR 01/12/2024 151    • Post peak BP 01/12/2024 160    • O2 sat peak 01/12/2024 98    • Recovery HR 01/12/2024 92    • Recovery BP 01/12/2024 120/76    • O2 sat recovery 01/12/2024 98    • Max HR 01/12/2024 151    • Max HR Percent 01/12/2024 100    • Exercise duration (min) 01/12/2024 9    • Exercise duration (sec) 01/12/2024 1    • Estimated workload 01/12/2024 10.1    • Rate Pressure Product 01/12/2024 24,160.0    • Angina Index 01/12/2024 0    • Michael Treadmill Score 01/12/2024 2    • ST Depression (mm) 01/12/2024 1.5    • Protocol Name 01/12/2024 DAE    • Exercise duration (min) 01/12/2024 9    • Exercise duration (sec) 01/12/2024 1    • Post Peak Systolic BP 01/12/2024 170    • Max Diastolic Bp 01/12/2024 80    • Peak HR 01/12/2024 151    • Max Predicted Heart Rate 01/12/2024 150    • Reason for Termination 01/12/2024 Target Heart Rate Achieved    • Test Indication 01/12/2024 Palpitations    • Target Hr Formular 01/12/2024 (220 - Age)*100%    • Chest Pain Statement 01/12/2024 none          Radiology Results:   Stress strip    Result Date: 1/12/2024  Narrative: Confirmed by Archive, Archive ([id number]),  Felisa Noble ([id number]) on 1/12/2024 2:58:54 PM    Stress test only, exercise    Result Date: 1/12/2024  Narrative: •  Stress ECG: Horizontal ST depression of 1-1.5 mm in the inferolateral leads (II, III, aVF, V5 and V6) is noted. ST deviation persisted during recovery. The ECG was positive for ischemia. The stress ECG is consistent with ischemia after maximal exercise, without reproduction of symptoms. •  Stress ECG: Overall, the patient's exercise capacity was normal for their age. The patient after exercising for 9 min and 1 sec and had a maximal HR of 151 bpm (100% of MPHR) and 10.1 METS. The patient experienced no angina during the test. Abnormal stress test. There is stress ECG evidence concerning for ischemia. No chest pain with exercise. Normal  "exercise capacity (10.1 METS) In the absence of chest pain or clear ischemic symptoms, these may represent false positive stress ECG changes.  Clinical correlation is suggested.  If continued clinical concerns for ischemia, consider repeating a stress echocardiogram or pursuing alternative ischemic testing.     Holter monitor    Result Date: 2024  Narrative: PT NAME: Pedro Avila : 1955   AGE: 68 y.o.  MRN: [id number]   GENDER: female Holter monitor ORDERING PROVIDER: Maru West MD INDICATIONS: Palpitations, PVC's (premature ventricular contractions), Essential hypertension, NSVT (nonsustained ventricular tachycardia) (Formerly [facility]) DESCRIPTION OF FINDINGS: The patient was monitored for a total of 24 hours.  The patient was predominantly in normal sinus rhythm throughout the study.  The average heart rate was 70 beats per minute.  The heart rate ranged from a minimum of 45 beats per minute to a maximum of 148 beats per minute. Ventricular ectopic activity consisted of 3270 beats (3.5% of total beats), of which, 2150 were single PVCs, 503 were ventricular couplets, 3 were in bigeminy and 3 were in trigeminy. There were 2-short runs of non-sustianed ventricular tachycardia - longest upto 5  beats. There was no sustained ventricular tachycardia noted. Supraventricular ectopic activity consisted of 62 beats (0.1% of total beats), all of which were isolated PACs. There was no evidence of supraventricular tachycardia, atrial fibrillation or atrial flutter. There were no significant pauses. The longest R-R interval was 1.6 seconds.  There was no evidence of advanced degree heart block. Patient reported symptoms of \"chest tightness\" on 2 occasions  during the monitoring period.  Correlation with tracings at these times revealed sinus tachycardia with excessive baseline artifact..     Impression: Predominantly normal sinus rhythm, with an average heart rate of 70 beats per minute Rare premature atrial contractions " "Frequent premature ventricular contractions (3.5%) 2 short runs of non-sustained ventricular tachycardia - upto 5 beats noted No significant pauses or advanced degree heart block Patient reported symptoms of \"chest tightness\" correlated with sinus tachycardia, although there was significant artifact during this period, limiting any further interpretation. Clinical correlation is suggested for the same. Interpreting Physician: Alfonso Guzman MD    Mammo screening bilateral w 3d & cad    Result Date: 12/26/2023  Narrative: DIAGNOSIS: Visit for screening mammogram TECHNIQUE: Digital screening mammography was performed. Computer Aided Detection (CAD) analyzed all applicable images. COMPARISONS: Prior breast imaging dated: 12/20/2022, 09/09/2021, 09/02/2021, 09/07/2019, 09/05/2018, 07/13/2017, 02/17/2016, and 11/25/2014 RELEVANT HISTORY: Family Breast Cancer History: No known family history of breast cancer. Family Medical History: No known relevant family medical history. Personal History: Hormone history includes birth control and estrogen replacement therapy. Surgical history includes hysterectomy and oophorectomy. No known relevant medical history. The patient is scheduled in a reminder system for screening mammography. 8-10% of cancers will be missed on mammography. Management of a palpable abnormality must be based on clinical grounds.  Patients will be notified of their results via letter from our facility. Accredited by American College of Radiology and FDA. RISK ASSESSMENT: 5 Year Tyrer-Cuzick: 1.04% 10 Year Tyrer-Cuzick: 2.21% Lifetime Tyrer-Cuzick: 3.51% TISSUE DENSITY: There are scattered areas of fibroglandular density. INDICATION: Pedro Avila is a 68 y.o. female presenting for screening mammography. FINDINGS: Bilateral There are no suspicious masses, grouped microcalcifications or areas of unexplained architectural distortion. The skin and nipple areolar complex are unremarkable.  There are scattered " calcifications present.     Impression: No mammographic evidence of malignancy. ASSESSMENT/BI-RADS CATEGORY: Left: 2 - Benign Right: 2 - Benign Overall: 2 - Benign RECOMMENDATION:      - Routine screening mammogram in 1 year for both breasts. Workstation ID: [id number]-C

## 2024-01-15 NOTE — PROGRESS NOTES
Abnormal stress test with excellent functional capacity, no symptoms but ECG changes    Will check a stress echo

## 2024-01-15 NOTE — PATIENT INSTRUCTIONS
Scheduled date of colonoscopy (as of today):  04/08/24  Physician performing colonoscopy:  Dr Gay  Location of colonoscopy:  [facility]   Bowel prep reviewed with patient:  Miralax/dulcolax  Instructions reviewed with patient by:  Alessia SU   Clearances: cardiology - due to recent stress test per PA

## 2024-01-15 NOTE — LETTER
January 15, 2024     Maru West MD  [address] [facility] [address]    Patient: Pedro Avila   YOB: 1955   Date of Visit: 1/15/2024       Dear Dr. West:    Thank you for referring Pedro Avila to me for evaluation. Below are my notes for this consultation.    If you have questions, please do not hesitate to call me. I look forward to following your patient along with you.         Sincerely,        Vamsi Pollock PA-C        CC: No Recipients    Vamsi Pollock PA-C  1/15/2024 10:01 AM  Incomplete  [facility] - New Patient Office Visit  Pedro Avila 68 y.o. female MRN: [id number]  Encounter: [id number]          ASSESSMENT AND PLAN:      1. History of colon polyps  -Last colonoscopy in 2018 with polyp removed by appendiceal orifice  -And repeat colonoscopy  - This risks of this procedure include but are not limited to; anesthesia complications, injury/perforation of the bowel, infection and bleeding.      ______________________________________________________________________    Chief Complaint: Tree of colon polyps    HPI: This is a 68-year-old white female with past medical history of hypertension, hyperlipidemia, osteoarthritis, eczema, osteoporosis, prediabetes and anxiety who presents for surveillance colonoscopy.  Her last colonoscopy was in November 2018 with a polyp removed near the epidural orifice.  I do not have the tissue or pathology.    Recently had EKG that showed excessive PVC's.   Recently started on beta blocker from cardiologist (Dr. Blair).  Had holter monitor for a while.   Recent stress test continues to show PVC's    Patient is here for next surveillance colonoscopy.   No family history of colon cancer or inflammatory bowel disease.  No pacemaker or defibrillator implanted.   No chronic kidney disease or solitary kidney.  Not on any supplemental oxygen at night.  Not on any antiplatelet or anticoagulants.    Patient denies any abdominal  pain, nausea/vomiting, pain or difficulty swallowing, change in bowels, black or bloody stools    Endoscopy History:  EGD -unknown  Colonoscopy -2018 with polyp removed in your appendicular orifice, sigmoid diverticulosis and internal hemorrhoids noted    REVIEW OF SYSTEMS:    CONSTITUTIONAL: Denies any fever, chills, rigors, and weight loss.  HEENT: Denies hearing loss or visual disturbances.  CARDIOVASCULAR: No chest pain or palpitations.   RESPIRATORY: Denies any cough, hemoptysis, shortness of breath or dyspnea on exertion.  GASTROINTESTINAL: As noted in the History of Present Illness.   GENITOURINARY: No problems with urination. Denies any hematuria or dysuria.  NEUROLOGIC: No dizziness or vertigo, denies headaches.   MUSCULOSKELETAL: Denies any muscle or joint pain.   SKIN: Denies skin rashes or itching.   ENDOCRINE: Denies excessive thirst. Denies intolerance to heat or cold.  PSYCHOSOCIAL: Denies depression or anxiety. Denies any recent memory loss.       Historical Information  Past Medical History:   Diagnosis Date   • Allergic     Hive reaction to Macrodantin,  - reaction to shrimp   • Arthritis ?    take Meloxicam, Glucosamine & Chondroitin   • Hypertension ?    take hydrochlorothiazide, Valsartan & Atorvastatin   • Urinary tract infection 1975    Occasional bladder infections throughout adulthood     Past Surgical History:   Procedure Laterality Date   •  SECTION  1980   • EYE SURGERY  2020    cataract surgery R eye   • HYSTERECTOMY  2000   • OOPHORECTOMY     • PAROTIDECTOMY       Social History  Social History     Substance and Sexual Activity   Alcohol Use Yes   • Alcohol/week: 1.0 standard drink of alcohol   • Types: 1 Glasses of wine per week    Comment: Very limited. Less than 1 per week.     Social History     Substance and Sexual Activity   Drug Use Never     Social History     Tobacco Use   Smoking Status Never   Smokeless Tobacco Never     Family  "History   Problem Relation Age of Onset   • Depression Mother         Bi-polar   • Cancer Mother         Non-small cell lung cancer (non-smoker)   • Bipolar disorder Mother    • Dementia Maternal Grandmother    • Dementia Maternal Uncle    • Hypertension Father    • Heart disease Father    • Hearing loss Father    • Hypertension Brother    • Heart disease Maternal Grandfather    • Diabetes Maternal Grandfather    • Completed Suicide  Maternal Uncle    • No Known Problems Daughter    • No Known Problems Daughter    • No Known Problems Paternal Aunt        Meds/Allergies      Current Outpatient Medications:   •  atorvastatin (LIPITOR) 10 mg tablet  •  cholecalciferol (VITAMIN D3) 400 units tablet  •  clobetasol (TEMOVATE) 0.05 % ointment  •  Echinacea 125 MG CAPS  •  glucosamine-chondroitin 500-400 MG tablet  •  hydrochlorothiazide (HYDRODIURIL) 25 mg tablet  •  ketoconazole (NIZORAL) 2 % cream  •  LORazepam (Ativan) 0.5 mg tablet  •  Multiple Vitamins-Minerals (MULTIVITAMIN ADULT EXTRA C PO)  •  valsartan (DIOVAN) 160 mg tablet  •  ascorbic acid (VITAMIN C) 500 MG tablet  •  metoprolol succinate (TOPROL-XL) 25 mg 24 hr tablet    Allergies   Allergen Reactions   • Nitrofurantoin Hives   • Shrimp Extract Allergy Skin Test - Food Allergy Hives     Scratchy throat    • Pneumococcal Vaccine Other (See Comments)   • Shellfish Allergy - Food Allergy Hives   • Sulfa Antibiotics Hives, Itching and Lip Swelling           Objective    Blood pressure 136/82, pulse 71, height 5' 1\" (1.549 m), weight 62.2 kg (137 lb 3.2 oz), SpO2 98%, not currently breastfeeding. Body mass index is 25.92 kg/m².        PHYSICAL EXAM:      General Appearance:   Alert, cooperative, no distress, appears stated age   HEENT:   Normocephalic, atraumatic, anicteric.     Neck:  Supple, symmetrical   Lungs:   Clear to auscultation bilaterally; no rales, rhonchi or wheezing; respirations unlabored    Heart::   Regular rate and rhythm; no murmur, rub, or " gallop.   Abdomen:   Soft, non-tender, non-distended; normal bowel sounds; no masses, no organomegaly    Genitalia:   Deferred    Rectal:   Deferred    Extremities:  No cyanosis, clubbing or edema    Pulses:  Not assessed   Skin:  No jaundice, rashes, or lesions    Lymph nodes:  Not assessed       Lab Results:   No visits with results within 1 Day(s) from this visit.   Latest known visit with results is:   Hospital Outpatient Visit on 01/12/2024   Component Date Value   • Baseline HR 01/12/2024 81    • Baseline BP 01/12/2024 122/80    • O2 sat rest 01/12/2024 98    • Stress peak HR 01/12/2024 151    • Post peak BP 01/12/2024 160    • O2 sat peak 01/12/2024 98    • Recovery HR 01/12/2024 92    • Recovery BP 01/12/2024 120/76    • O2 sat recovery 01/12/2024 98    • Max HR 01/12/2024 151    • Max HR Percent 01/12/2024 100    • Exercise duration (min) 01/12/2024 9    • Exercise duration (sec) 01/12/2024 1    • Estimated workload 01/12/2024 10.1    • Rate Pressure Product 01/12/2024 24,160.0    • Angina Index 01/12/2024 0    • Michael Treadmill Score 01/12/2024 2    • ST Depression (mm) 01/12/2024 1.5    • Protocol Name 01/12/2024 DAE    • Exercise duration (min) 01/12/2024 9    • Exercise duration (sec) 01/12/2024 1    • Post Peak Systolic BP 01/12/2024 170    • Max Diastolic Bp 01/12/2024 80    • Peak HR 01/12/2024 151    • Max Predicted Heart Rate 01/12/2024 150    • Reason for Termination 01/12/2024 Target Heart Rate Achieved    • Test Indication 01/12/2024 Palpitations    • Target Hr Formular 01/12/2024 (220 - Age)*100%    • Chest Pain Statement 01/12/2024 none          Radiology Results:   Stress strip    Result Date: 1/12/2024  Narrative: Confirmed by Archive, Archive ([id number]),  Felisa Noble ([id number]) on 1/12/2024 2:58:54 PM    Stress test only, exercise    Result Date: 1/12/2024  Narrative: •  Stress ECG: Horizontal ST depression of 1-1.5 mm in the inferolateral leads (II, III, aVF, V5 and V6) is noted. ST  deviation persisted during recovery. The ECG was positive for ischemia. The stress ECG is consistent with ischemia after maximal exercise, without reproduction of symptoms. •  Stress ECG: Overall, the patient's exercise capacity was normal for their age. The patient after exercising for 9 min and 1 sec and had a maximal HR of 151 bpm (100% of MPHR) and 10.1 METS. The patient experienced no angina during the test. Abnormal stress test. There is stress ECG evidence concerning for ischemia. No chest pain with exercise. Normal exercise capacity (10.1 METS) In the absence of chest pain or clear ischemic symptoms, these may represent false positive stress ECG changes.  Clinical correlation is suggested.  If continued clinical concerns for ischemia, consider repeating a stress echocardiogram or pursuing alternative ischemic testing.     Holter monitor    Result Date: 2024  Narrative: PT NAME: Pedro Avila : 1955   AGE: 68 y.o.  MRN: [id number]   GENDER: female Holter monitor ORDERING PROVIDER: Maru West MD INDICATIONS: Palpitations, PVC's (premature ventricular contractions), Essential hypertension, NSVT (nonsustained ventricular tachycardia) ([facility]) DESCRIPTION OF FINDINGS: The patient was monitored for a total of 24 hours.  The patient was predominantly in normal sinus rhythm throughout the study.  The average heart rate was 70 beats per minute.  The heart rate ranged from a minimum of 45 beats per minute to a maximum of 148 beats per minute. Ventricular ectopic activity consisted of 3270 beats (3.5% of total beats), of which, 2150 were single PVCs, 503 were ventricular couplets, 3 were in bigeminy and 3 were in trigeminy. There were 2-short runs of non-sustianed ventricular tachycardia - longest upto 5  beats. There was no sustained ventricular tachycardia noted. Supraventricular ectopic activity consisted of 62 beats (0.1% of total beats), all of which were isolated PACs. There was no evidence of  "supraventricular tachycardia, atrial fibrillation or atrial flutter. There were no significant pauses. The longest R-R interval was 1.6 seconds.  There was no evidence of advanced degree heart block. Patient reported symptoms of \"chest tightness\" on 2 occasions  during the monitoring period.  Correlation with tracings at these times revealed sinus tachycardia with excessive baseline artifact..     Impression: Predominantly normal sinus rhythm, with an average heart rate of 70 beats per minute Rare premature atrial contractions Frequent premature ventricular contractions (3.5%) 2 short runs of non-sustained ventricular tachycardia - upto 5 beats noted No significant pauses or advanced degree heart block Patient reported symptoms of \"chest tightness\" correlated with sinus tachycardia, although there was significant artifact during this period, limiting any further interpretation. Clinical correlation is suggested for the same. Interpreting Physician: Alfonso Guzman MD    Mammo screening bilateral w 3d & cad    Result Date: 12/26/2023  Narrative: DIAGNOSIS: Visit for screening mammogram TECHNIQUE: Digital screening mammography was performed. Computer Aided Detection (CAD) analyzed all applicable images. COMPARISONS: Prior breast imaging dated: 12/20/2022, 09/09/2021, 09/02/2021, 09/07/2019, 09/05/2018, 07/13/2017, 02/17/2016, and 11/25/2014 RELEVANT HISTORY: Family Breast Cancer History: No known family history of breast cancer. Family Medical History: No known relevant family medical history. Personal History: Hormone history includes birth control and estrogen replacement therapy. Surgical history includes hysterectomy and oophorectomy. No known relevant medical history. The patient is scheduled in a reminder system for screening mammography. 8-10% of cancers will be missed on mammography. Management of a palpable abnormality must be based on clinical grounds.  Patients will be notified of their results via letter " from our facility. Accredited by American College of [employer] and FDA. RISK ASSESSMENT: 5 Year Tyrer-Cuzick: 1.04% 10 Year Tyrer-Cuzick: 2.21% Lifetime Tyrer-Cuzick: 3.51% TISSUE DENSITY: There are scattered areas of fibroglandular density. INDICATION: Pedro Avila is a 68 y.o. female presenting for screening mammography. FINDINGS: Bilateral There are no suspicious masses, grouped microcalcifications or areas of unexplained architectural distortion. The skin and nipple areolar complex are unremarkable.  There are scattered calcifications present.     Impression: No mammographic evidence of malignancy. ASSESSMENT/BI-RADS CATEGORY: Left: 2 - Benign Right: 2 - Benign Overall: 2 - Benign RECOMMENDATION:      - Routine screening mammogram in 1 year for both breasts. Workstation ID: [id number]       Vamsi Pollock PA-C  1/15/2024  8:32 AM  Sign when Signing Visit  [facility] - New Patient Office Visit  Pedro Avila 68 y.o. female MRN: [id number]  Encounter: [id number]          ASSESSMENT AND PLAN:      1. History of colon polyps  -Last colonoscopy in 2018 with polyp removed by appendiceal orifice  -And repeat colonoscopy  - This risks of this procedure include but are not limited to; anesthesia complications, injury/perforation of the bowel, infection and bleeding.      ______________________________________________________________________    Chief Complaint: Tree of colon polyps    HPI: This is a 68-year-old white female with past medical history of hypertension, hyperlipidemia, osteoarthritis, eczema, osteoporosis, prediabetes and anxiety who presents for surveillance colonoscopy.  Her last colonoscopy was in November 2018 with a polyp removed near the epidural orifice.  I do not have the tissue or pathology.    Patient is here for next surveillance colonoscopy.   No family history of colon cancer or inflammatory bowel disease.  No pacemaker or defibrillator  implanted.   No chronic kidney disease or solitary kidney.  Not on any supplemental oxygen at night.  Not on any antiplatelet or anticoagulants.    Patient denies any abdominal pain, nausea/vomiting, pain or difficulty swallowing, change in bowels, black or bloody stools        Endoscopy History:  EGD -unknown  Colonoscopy -2018 with polyp removed in your appendicular orifice, sigmoid diverticulosis and internal hemorrhoids noted    REVIEW OF SYSTEMS:    CONSTITUTIONAL: Denies any fever, chills, rigors, and weight loss.  HEENT: Denies hearing loss or visual disturbances.  CARDIOVASCULAR: No chest pain or palpitations.   RESPIRATORY: Denies any cough, hemoptysis, shortness of breath or dyspnea on exertion.  GASTROINTESTINAL: As noted in the History of Present Illness.   GENITOURINARY: No problems with urination. Denies any hematuria or dysuria.  NEUROLOGIC: No dizziness or vertigo, denies headaches.   MUSCULOSKELETAL: Denies any muscle or joint pain.   SKIN: Denies skin rashes or itching.   ENDOCRINE: Denies excessive thirst. Denies intolerance to heat or cold.  PSYCHOSOCIAL: Denies depression or anxiety. Denies any recent memory loss.       Historical Information  Past Medical History:   Diagnosis Date   • Allergic     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    take Meloxicam, Glucosamine & Chondroitin   • Hypertension ?    take hydrochlorothiazide, Valsartan & Atorvastatin   • Urinary tract infection 1975    Occasional bladder infections throughout adulthood     Past Surgical History:   Procedure Laterality Date   •  SECTION  1980   • EYE SURGERY  2020    cataract surgery R eye   • HYSTERECTOMY  2000   • OOPHORECTOMY     • PAROTIDECTOMY       Social History  Social History     Substance and Sexual Activity   Alcohol Use Yes   • Alcohol/week: 1.0 standard drink of alcohol   • Types: 1 Glasses of wine per week    Comment: Very limited. Less  than 1 per week.     Social History     Substance and Sexual Activity   Drug Use Never     Social History     Tobacco Use   Smoking Status Never   Smokeless Tobacco Never     Family History   Problem Relation Age of Onset   • Depression Mother         Bi-polar   • Cancer Mother         Non-small cell lung cancer (non-smoker)   • Bipolar disorder Mother    • Dementia Maternal Grandmother    • Dementia Maternal Uncle    • Hypertension Father    • Heart disease Father    • Hearing loss Father    • Hypertension Brother    • Heart disease Maternal Grandfather    • Diabetes Maternal Grandfather    • Completed Suicide  Maternal Uncle    • No Known Problems Daughter    • No Known Problems Daughter    • No Known Problems Paternal Aunt        Meds/Allergies      Current Outpatient Medications:   •  ascorbic acid (VITAMIN C) 500 MG tablet  •  atorvastatin (LIPITOR) 10 mg tablet  •  cholecalciferol (VITAMIN D3) 400 units tablet  •  clobetasol (TEMOVATE) 0.05 % ointment  •  Echinacea 125 MG CAPS  •  glucosamine-chondroitin 500-400 MG tablet  •  hydrochlorothiazide (HYDRODIURIL) 25 mg tablet  •  ketoconazole (NIZORAL) 2 % cream  •  LORazepam (Ativan) 0.5 mg tablet  •  metoprolol succinate (TOPROL-XL) 25 mg 24 hr tablet  •  Multiple Vitamins-Minerals (MULTIVITAMIN ADULT EXTRA C PO)  •  valsartan (DIOVAN) 160 mg tablet    Allergies   Allergen Reactions   • Nitrofurantoin Hives   • Shrimp Extract Allergy Skin Test - Food Allergy Hives     Scratchy throat    • Pneumococcal Vaccine Other (See Comments)   • Shellfish Allergy - Food Allergy Hives   • Sulfa Antibiotics Hives, Itching and Lip Swelling           Objective    not currently breastfeeding. There is no height or weight on file to calculate BMI.        PHYSICAL EXAM:      General Appearance:   Alert, cooperative, no distress, appears stated age   HEENT:   Normocephalic, atraumatic, anicteric.     Neck:  Supple, symmetrical   Lungs:   Clear to auscultation bilaterally; no rales,  rhonchi or wheezing; respirations unlabored    Heart::   Regular rate and rhythm; no murmur, rub, or gallop.   Abdomen:   Soft, non-tender, non-distended; normal bowel sounds; no masses, no organomegaly    Genitalia:   Deferred    Rectal:   Deferred    Extremities:  No cyanosis, clubbing or edema    Pulses:  Not assessed   Skin:  No jaundice, rashes, or lesions    Lymph nodes:  Not assessed       Lab Results:   No visits with results within 1 Day(s) from this visit.   Latest known visit with results is:   Hospital Outpatient Visit on 01/12/2024   Component Date Value   • Baseline HR 01/12/2024 81    • Baseline BP 01/12/2024 122/80    • O2 sat rest 01/12/2024 98    • Stress peak HR 01/12/2024 151    • Post peak BP 01/12/2024 160    • O2 sat peak 01/12/2024 98    • Recovery HR 01/12/2024 92    • Recovery BP 01/12/2024 120/76    • O2 sat recovery 01/12/2024 98    • Max HR 01/12/2024 151    • Max HR Percent 01/12/2024 100    • Exercise duration (min) 01/12/2024 9    • Exercise duration (sec) 01/12/2024 1    • Estimated workload 01/12/2024 10.1    • Rate Pressure Product 01/12/2024 24,160.0    • Angina Index 01/12/2024 0    • Michael Treadmill Score 01/12/2024 2    • ST Depression (mm) 01/12/2024 1.5    • Protocol Name 01/12/2024 DAE    • Exercise duration (min) 01/12/2024 9    • Exercise duration (sec) 01/12/2024 1    • Post Peak Systolic BP 01/12/2024 170    • Max Diastolic Bp 01/12/2024 80    • Peak HR 01/12/2024 151    • Max Predicted Heart Rate 01/12/2024 150    • Reason for Termination 01/12/2024 Target Heart Rate Achieved    • Test Indication 01/12/2024 Palpitations    • Target Hr Formular 01/12/2024 (220 - Age)*100%    • Chest Pain Statement 01/12/2024 none          Radiology Results:   Stress strip    Result Date: 1/12/2024  Narrative: Confirmed by Archive, Archive ([id number]),  Felisa Noble ([id number]) on 1/12/2024 2:58:54 PM    Stress test only, exercise    Result Date: 1/12/2024  Narrative: •  Stress ECG:  Horizontal ST depression of 1-1.5 mm in the inferolateral leads (II, III, aVF, V5 and V6) is noted. ST deviation persisted during recovery. The ECG was positive for ischemia. The stress ECG is consistent with ischemia after maximal exercise, without reproduction of symptoms. •  Stress ECG: Overall, the patient's exercise capacity was normal for their age. The patient after exercising for 9 min and 1 sec and had a maximal HR of 151 bpm (100% of MPHR) and 10.1 METS. The patient experienced no angina during the test. Abnormal stress test. There is stress ECG evidence concerning for ischemia. No chest pain with exercise. Normal exercise capacity (10.1 METS) In the absence of chest pain or clear ischemic symptoms, these may represent false positive stress ECG changes.  Clinical correlation is suggested.  If continued clinical concerns for ischemia, consider repeating a stress echocardiogram or pursuing alternative ischemic testing.     Holter monitor    Result Date: 2024  Narrative: PT NAME: Pedro Avila : 1955   AGE: 68 y.o.  MRN: [id number]   GENDER: female Holter monitor ORDERING PROVIDER: Maru West MD INDICATIONS: Palpitations, PVC's (premature ventricular contractions), Essential hypertension, NSVT (nonsustained ventricular tachycardia) ([facility]) DESCRIPTION OF FINDINGS: The patient was monitored for a total of 24 hours.  The patient was predominantly in normal sinus rhythm throughout the study.  The average heart rate was 70 beats per minute.  The heart rate ranged from a minimum of 45 beats per minute to a maximum of 148 beats per minute. Ventricular ectopic activity consisted of 3270 beats (3.5% of total beats), of which, 2150 were single PVCs, 503 were ventricular couplets, 3 were in bigeminy and 3 were in trigeminy. There were 2-short runs of non-sustianed ventricular tachycardia - longest upto 5  beats. There was no sustained ventricular tachycardia noted. Supraventricular ectopic activity  "consisted of 62 beats (0.1% of total beats), all of which were isolated PACs. There was no evidence of supraventricular tachycardia, atrial fibrillation or atrial flutter. There were no significant pauses. The longest R-R interval was 1.6 seconds.  There was no evidence of advanced degree heart block. Patient reported symptoms of \"chest tightness\" on 2 occasions  during the monitoring period.  Correlation with tracings at these times revealed sinus tachycardia with excessive baseline artifact..     Impression: Predominantly normal sinus rhythm, with an average heart rate of 70 beats per minute Rare premature atrial contractions Frequent premature ventricular contractions (3.5%) 2 short runs of non-sustained ventricular tachycardia - upto 5 beats noted No significant pauses or advanced degree heart block Patient reported symptoms of \"chest tightness\" correlated with sinus tachycardia, although there was significant artifact during this period, limiting any further interpretation. Clinical correlation is suggested for the same. Interpreting Physician: Alfonso Guzman MD    Mammo screening bilateral w 3d & cad    Result Date: 12/26/2023  Narrative: DIAGNOSIS: Visit for screening mammogram TECHNIQUE: Digital screening mammography was performed. Computer Aided Detection (CAD) analyzed all applicable images. COMPARISONS: Prior breast imaging dated: 12/20/2022, 09/09/2021, 09/02/2021, 09/07/2019, 09/05/2018, 07/13/2017, 02/17/2016, and 11/25/2014 RELEVANT HISTORY: Family Breast Cancer History: No known family history of breast cancer. Family Medical History: No known relevant family medical history. Personal History: Hormone history includes birth control and estrogen replacement therapy. Surgical history includes hysterectomy and oophorectomy. No known relevant medical history. The patient is scheduled in a reminder system for screening mammography. 8-10% of cancers will be missed on mammography. Management of a " palpable abnormality must be based on clinical grounds.  Patients will be notified of their results via letter from our facility. Accredited by American [employer] of [employer] and FDA. RISK ASSESSMENT: 5 Year Tyrer-Cuzick: 1.04% 10 Year Tyrer-Cuzick: 2.21% Lifetime Tyrer-Cuzick: 3.51% TISSUE DENSITY: There are scattered areas of fibroglandular density. INDICATION: Pedro Avila is a 68 y.o. female presenting for screening mammography. FINDINGS: Bilateral There are no suspicious masses, grouped microcalcifications or areas of unexplained architectural distortion. The skin and nipple areolar complex are unremarkable.  There are scattered calcifications present.     Impression: No mammographic evidence of malignancy. ASSESSMENT/BI-RADS CATEGORY: Left: 2 - Benign Right: 2 - Benign Overall: 2 - Benign RECOMMENDATION:      - Routine screening mammogram in 1 year for both breasts. Workstation ID: [id number] Pollock PA-C

## 2024-01-16 ENCOUNTER — TELEPHONE (OUTPATIENT)
Dept: CARDIOLOGY CLINIC | Facility: MEDICAL CENTER | Age: 69
End: 2024-01-16

## 2024-01-16 DIAGNOSIS — R00.2 PALPITATIONS: ICD-10-CM

## 2024-01-16 DIAGNOSIS — R94.31 ABNORMAL EKG: ICD-10-CM

## 2024-01-16 RX ORDER — METOPROLOL SUCCINATE 25 MG/1
25 TABLET, EXTENDED RELEASE ORAL DAILY
Qty: 90 TABLET | Refills: 0 | Status: SHIPPED | OUTPATIENT
Start: 2024-01-16

## 2024-01-16 NOTE — TELEPHONE ENCOUNTER
Pt walked in.  Had her stress test, a stress echo  was ordered for her. She's having that 1/31  She only has  14 tablets left of her  metoprolol and needs to know if we can order it now.   She's not sure if her dose will stay the same or not.(The pcp) did recently  [facility] [address]

## 2024-01-22 ENCOUNTER — TELEPHONE (OUTPATIENT)
Dept: CARDIOLOGY CLINIC | Facility: CLINIC | Age: 69
End: 2024-01-22

## 2024-01-22 NOTE — TELEPHONE ENCOUNTER
Yudelka Austin . My birthday is [birth year]. I'm a patient of Doctor Rossy and I got a letter about some I should follow up with him. I he has scheduled another test but it's not until the 31st and I would like to talk to him or somebody in the office Before that. I have some comments and questions and just wondering who I should talk to about that. So if you could get back to me at your earliest convenience, I would really appreciate it. My number is [phone]. Thanks an awful lot and I will look forward to hearing from someone. Bye, bye.

## 2024-01-23 NOTE — TELEPHONE ENCOUNTER
Questions:    1) Patient wants to know if there's any reason not to travel to [place] in July.  2) Patient would like to know what you are looking for in Echo.  3) Patient wants to know why we don't just do the heart cath instead of the Echo.  4) Patient wants to know if she should take a low dose aspirin.  5) Patient says there are times that she has tightness feeling in her chest. Before you prescribed the metoprolol, her previous PCP prescribed her to a anxiety medication. The doctor told her that this would help with the tightness in her chest. Her question is should she continue to take the anxiety medication and if that helps, does she still need to take the metoprolol.    Patient would like a call from you directly if possible.

## 2024-01-23 NOTE — TELEPHONE ENCOUNTER
Dr pinto can u call this pt she's questioning why she has to have the other echo  . Please call pt after 1:30

## 2024-01-31 ENCOUNTER — HOSPITAL ENCOUNTER (OUTPATIENT)
Dept: NON INVASIVE DIAGNOSTICS | Facility: HOSPITAL | Age: 69
Discharge: HOME | End: 2024-01-31
Attending: INTERNAL MEDICINE
Payer: COMMERCIAL

## 2024-01-31 VITALS
HEIGHT: 61 IN | OXYGEN SATURATION: 96 % | BODY MASS INDEX: 25.86 KG/M2 | SYSTOLIC BLOOD PRESSURE: 140 MMHG | RESPIRATION RATE: 16 BRPM | HEART RATE: 72 BPM | WEIGHT: 137 LBS | DIASTOLIC BLOOD PRESSURE: 92 MMHG

## 2024-01-31 DIAGNOSIS — R94.39 ABNORMAL STRESS TEST: ICD-10-CM

## 2024-01-31 LAB
CHEST PAIN STATEMENT: NORMAL
MAX DIASTOLIC BP: 92 MMHG
MAX HR PERCENT: 102 %
MAX HR: 153 BPM
MAX PREDICTED HEART RATE: 150 BPM
PROTOCOL NAME: NORMAL
RATE PRESSURE PRODUCT: NORMAL
REASON FOR TERMINATION: NORMAL
SL CV LV EF: 60
SL CV STRESS RECOVERY BP: NORMAL MMHG
SL CV STRESS RECOVERY HR: 153 BPM
SL CV STRESS RECOVERY O2 SAT: 98 %
STRESS ANGINA INDEX: 0
STRESS BASELINE BP: NORMAL MMHG
STRESS BASELINE HR: 72 BPM
STRESS O2 SAT REST: 99 %
STRESS PEAK HR: 153 BPM
STRESS POST ESTIMATED WORKLOAD: 10.1 METS
STRESS POST EXERCISE DUR MIN: 9 MIN
STRESS POST EXERCISE DUR MIN: 9 MIN
STRESS POST EXERCISE DUR SEC: 0 SEC
STRESS POST EXERCISE DUR SEC: 0 SEC
STRESS POST O2 SAT PEAK: 98 %
STRESS POST PEAK BP: 160 MMHG
STRESS POST PEAK HR: 153 BPM
STRESS POST PEAK SYSTOLIC BP: 190 MMHG
TARGET HR FORMULA: NORMAL
TEST INDICATION: NORMAL

## 2024-01-31 PROCEDURE — 93350 STRESS TTE ONLY: CPT | Performed by: INTERNAL MEDICINE

## 2024-01-31 PROCEDURE — 93350 STRESS TTE ONLY: CPT

## 2024-03-30 DIAGNOSIS — I10 ESSENTIAL HYPERTENSION: ICD-10-CM

## 2024-03-30 DIAGNOSIS — E78.2 MIXED HYPERLIPIDEMIA: ICD-10-CM

## 2024-03-30 RX ORDER — ATORVASTATIN CALCIUM 10 MG/1
TABLET, FILM COATED ORAL
Qty: 90 TABLET | Refills: 1 | Status: SHIPPED | OUTPATIENT
Start: 2024-03-30

## 2024-03-30 RX ORDER — VALSARTAN 160 MG/1
TABLET ORAL
Qty: 90 TABLET | Refills: 1 | Status: SHIPPED | OUTPATIENT
Start: 2024-03-30

## 2024-04-01 RX ORDER — HYDROCHLOROTHIAZIDE 25 MG/1
25 TABLET ORAL DAILY
Qty: 90 TABLET | Refills: 1 | Status: SHIPPED | OUTPATIENT
Start: 2024-04-01

## 2024-04-02 ENCOUNTER — OFFICE VISIT (OUTPATIENT)
Dept: CARDIOLOGY CLINIC | Facility: MEDICAL CENTER | Age: 69
End: 2024-04-02
Payer: COMMERCIAL

## 2024-04-02 VITALS
HEIGHT: 61 IN | SYSTOLIC BLOOD PRESSURE: 126 MMHG | BODY MASS INDEX: 25.49 KG/M2 | WEIGHT: 135 LBS | HEART RATE: 74 BPM | DIASTOLIC BLOOD PRESSURE: 80 MMHG | OXYGEN SATURATION: 100 %

## 2024-04-02 DIAGNOSIS — I10 ESSENTIAL HYPERTENSION: Primary | ICD-10-CM

## 2024-04-02 DIAGNOSIS — E78.2 MIXED HYPERLIPIDEMIA: ICD-10-CM

## 2024-04-02 DIAGNOSIS — R00.2 PALPITATIONS: ICD-10-CM

## 2024-04-02 PROCEDURE — 99214 OFFICE O/P EST MOD 30 MIN: CPT | Performed by: INTERNAL MEDICINE

## 2024-04-02 NOTE — PROGRESS NOTES
Cardiology Follow up    Pedro Avila  [id number]  1955  [facility] [place]  [address] [place] [address]      Diagnoses and all orders for this visit:    Essential hypertension    Mixed hyperlipidemia    Palpitations      I had the pleasure of seeing Pedro Avila for a follow up regarding palpitaitons and PVCs requested by     History of the Presenting Illness, Discussion/Summary and my Plan are as follows:::    Cherelle Abdullahi is a pleasant 68-year-old lady with a history of hypertension and dyslipidemia-both treated, no tobacco use, no diabetes, no personal history of cardiac disease, who had sought attention for palpitations in the setting of increased family stress, leading to a 24-hour Holter that showed high PVC burden-10% of total beats, with short run of nonsustained VT leading to institution of metoprolol-currently at a dose of 25 mg daily.    Her family stressors have also decreased and with institution of metoprolol as well, her palpitations completely resolved and follow-up Holter-January 2024 showed decreased burden at 3.5% only.    She continues to regularly exercises-80 minutes, slow jog, stationary bike, without any limitations or any symptoms.    FH: dad - 'massive KOWALSKI' at 83, mom no heart issues, sibs and kids are well  Activity: 80 min of cardio a day - bike or slow jog,no change in physical capacity. No ICDs or arrhythmias    She underwent an echocardiogram that was unremarkable-November 2023, a treadmill stress test-January 2024 that was false positive ECG, followed by a stress echo that continue to show ECG changes but stress echo itself was normal, with excellent functional capacity and no symptoms-January 2024.    She is being planned for cataract surgery left eye.    Plan:    Preoperative evaluation prior to cataract surgery-left eye: Can proceed at low cardiac risk without further  cardiac testing.  Will fax note to [phone]    High PVC burden with run of nonsustained VT: Continue metoprolol without changes, echo without any structural heart disease,  See above for ischemic evaluation-negative.  Etiology could be from increased stress and catecholamines  Repeat Holter monitoring in January 2024 on metoprolol 25 mg daily show decrease burden at 3.5%.   No changes at this time.     Hypertension: On valsartan and metoprolol, controlled at home, has white coat syndrome     Dyslipidemia: Reasonably controlled although the last 1 was a little higher.    Follow-up in 6 months     Latest Reference Range & Units 10/05/22 09:27 03/31/23 09:04 10/12/23 09:28   Cholesterol See Comment mg/dL 179 184 198   Triglycerides See Comment mg/dL 149 150 174 (H)   HDL >=50 mg/dL 51 58 62   Non-HDL Cholesterol mg/dl 128 126 136   LDL Calculated 0 - 100 mg/dL 98 96 101 (H)   (H): Data is abnormally high     Latest Reference Range & Units 10/05/22 09:27 03/31/23 09:04 10/12/23 09:28   Hemoglobin A1C  6.0 (H) 5.8 (H) 5.9 (H)   (H): Data is abnormally high   Latest Reference Range & Units 03/31/23 09:04 10/12/23 09:28   Potassium 3.5 - 5.3 mmol/L 3.8 4.5     1. Essential hypertension        2. Mixed hyperlipidemia        3. Palpitations          Patient Active Problem List   Diagnosis    Essential hypertension    Mixed hyperlipidemia    Primary osteoarthritis involving multiple joints    Eczema    Biceps tendinitis of right shoulder    Need for vaccination    Osteoporosis    Arthritis of right glenohumeral joint    Elevated fasting blood sugar    Prediabetes    Left foot pain    Rash    Palpitations    Anxiety    History of colon polyps     Past Medical History:   Diagnosis Date    Allergic 2010    Hive reaction to Macrodantin, 2014 - reaction to shrimp    Arthritis 2012?    take Meloxicam, Glucosamine & Chondroitin    Hypertension 2000?    take hydrochlorothiazide, Valsartan & Atorvastatin    Urinary tract infection  1975    Occasional bladder infections throughout adulthood     Social History     Socioeconomic History    Marital status: /Civil Union     Spouse name: Not on file    Number of children: Not on file    Years of education: Not on file    Highest education level: Not on file   Occupational History    Not on file   Tobacco Use    Smoking status: Never    Smokeless tobacco: Never   Vaping Use    Vaping status: Never Used   Substance and Sexual Activity    Alcohol use: Yes     Alcohol/week: 1.0 standard drink of alcohol     Types: 1 Glasses of wine per week     Comment: Very limited. Less than 1 per week.    Drug use: Never    Sexual activity: Not Currently     Partners: Male     Birth control/protection: Post-menopausal, None   Other Topics Concern    Not on file   Social History Narrative    Not on file     Social Determinants of Health     Financial Resource Strain: Low Risk  (10/16/2023)    Overall Financial Resource Strain (CARDIA)     Difficulty of Paying Living Expenses: Not hard at all   Food Insecurity: Not on file   Transportation Needs: No Transportation Needs (10/16/2023)    PRAPARE - Transportation     Lack of Transportation (Medical): No     Lack of Transportation (Non-Medical): No   Physical Activity: Not on file   Stress: Not on file   Social Connections: Not on file   Intimate Partner Violence: Not on file   Housing Stability: Not on file      Family History   Problem Relation Age of Onset    Depression Mother         Bi-polar    Cancer Mother         Non-small cell lung cancer (non-smoker)    Bipolar disorder Mother     Dementia Maternal Grandmother     Dementia Maternal Uncle     Hypertension Father     Heart disease Father     Hearing loss Father     Hypertension Brother     Heart disease Maternal Grandfather     Diabetes Maternal Grandfather     Completed Suicide  Maternal Uncle     No Known Problems Daughter     No Known Problems Daughter     No Known Problems Paternal Aunt      Past  "Surgical History:   Procedure Laterality Date     SECTION  1980    EYE SURGERY  2020    cataract surgery R eye    HYSTERECTOMY  2000    OOPHORECTOMY      PAROTIDECTOMY         Current Outpatient Medications:     atorvastatin (LIPITOR) 10 mg tablet, TAKE 1 TABLET BY MOUTH EVERY DAY, Disp: 90 tablet, Rfl: 1    cholecalciferol (VITAMIN D3) 400 units tablet, Take 400 Units by mouth, Disp: , Rfl:     clobetasol (TEMOVATE) 0.05 % ointment, Apply topically 2 (two) times a day, Disp: 45 g, Rfl: 3    Echinacea 125 MG CAPS, Take by mouth, Disp: , Rfl:     glucosamine-chondroitin 500-400 MG tablet, Take 2 tablets by mouth , Disp: , Rfl:     hydroCHLOROthiazide 25 mg tablet, TAKE 1 TABLET (25 MG TOTAL) BY MOUTH DAILY., Disp: 90 tablet, Rfl: 1    ketoconazole (NIZORAL) 2 % cream, Apply topically daily, Disp: 30 g, Rfl: 3    LORazepam (Ativan) 0.5 mg tablet, Take 1 tablet (0.5 mg total) by mouth every 8 (eight) hours as needed for anxiety, Disp: 60 tablet, Rfl: 1    metoprolol succinate (TOPROL-XL) 25 mg 24 hr tablet, Take 1 tablet (25 mg total) by mouth daily, Disp: 90 tablet, Rfl: 0    Multiple Vitamins-Minerals (MULTIVITAMIN ADULT EXTRA C PO), Take 1 tablet by mouth, Disp: , Rfl:     valsartan (DIOVAN) 160 mg tablet, TAKE 1 TABLET BY MOUTH EVERY DAY, Disp: 90 tablet, Rfl: 1    ascorbic acid (VITAMIN C) 500 MG tablet, Take 500 mg by mouth (Patient not taking: Reported on 1/15/2024), Disp: , Rfl:   Allergies   Allergen Reactions    Nitrofurantoin Hives    Shrimp Extract Allergy Skin Test - Food Allergy Hives     Scratchy throat     Pneumococcal Vaccine Other (See Comments)    Shellfish Allergy - Food Allergy Hives    Sulfa Antibiotics Hives, Itching and Lip Swelling     Vitals:    24 1115   BP: 126/80   BP Location: Left arm   Patient Position: Sitting   Cuff Size: Adult   Pulse: 74   SpO2: 100%   Weight: 61.2 kg (135 lb)   Height: 5' 1\" (1.549 m)         Imaging: Mammo screening bilateral w 3d & " cad    Result Date: 12/26/2023  Narrative: DIAGNOSIS: Visit for screening mammogram TECHNIQUE: Digital screening mammography was performed. Computer Aided Detection (CAD) analyzed all applicable images. COMPARISONS: Prior breast imaging dated: 12/20/2022, 09/09/2021, 09/02/2021, 09/07/2019, 09/05/2018, 07/13/2017, 02/17/2016, and 11/25/2014 RELEVANT HISTORY: Family Breast Cancer History: No known family history of breast cancer. Family Medical History: No known relevant family medical history. Personal History: Hormone history includes birth control and estrogen replacement therapy. Surgical history includes hysterectomy and oophorectomy. No known relevant medical history. The patient is scheduled in a reminder system for screening mammography. 8-10% of cancers will be missed on mammography. Management of a palpable abnormality must be based on clinical grounds.  Patients will be notified of their results via letter from our facility. Accredited by [employer] and FDA. RISK ASSESSMENT: 5 Year Tyrer-Cuzick: 1.04% 10 Year Tyrer-Cuzick: 2.21% Lifetime Tyrer-Cuzick: 3.51% TISSUE DENSITY: There are scattered areas of fibroglandular density. INDICATION: Pedro Avila is a 68 y.o. female presenting for screening mammography. FINDINGS: Bilateral There are no suspicious masses, grouped microcalcifications or areas of unexplained architectural distortion. The skin and nipple areolar complex are unremarkable.  There are scattered calcifications present.     Impression: No mammographic evidence of malignancy. ASSESSMENT/BI-RADS CATEGORY: Left: 2 - Benign Right: 2 - Benign Overall: 2 - Benign RECOMMENDATION:      - Routine screening mammogram in 1 year for both breasts. Workstation ID: [id number]       Review of Systems:  Review of Systems   Constitutional: Negative.    HENT: Negative.     Eyes: Negative.    Respiratory: Negative.     Cardiovascular:  Negative for palpitations.   Endocrine: Negative.   "  Musculoskeletal: Negative.        Physical Exam:    /80 (BP Location: Left arm, Patient Position: Sitting, Cuff Size: Adult)   Pulse 74   Ht 5' 1\" (1.549 m)   Wt 61.2 kg (135 lb)   SpO2 100%   BMI 25.51 kg/m²   Physical Exam  Constitutional:       General: She is not in acute distress.     Appearance: She is not ill-appearing, toxic-appearing or diaphoretic.   HENT:      Nose: Nose normal. No congestion or rhinorrhea.      Mouth/Throat:      Mouth: Mucous membranes are moist.   Neck:      Vascular: No carotid bruit.   Cardiovascular:      Rate and Rhythm: Normal rate and regular rhythm.      Heart sounds: No murmur heard.     No friction rub. No gallop.   Pulmonary:      Effort: Pulmonary effort is normal. No respiratory distress.      Breath sounds: No stridor. No wheezing or rhonchi.   Abdominal:      General: Abdomen is flat. Bowel sounds are normal. There is no distension.      Palpations: There is no mass.      Tenderness: There is no abdominal tenderness.      Hernia: No hernia is present.   Musculoskeletal:         General: No swelling, tenderness, deformity or signs of injury. Normal range of motion.      Cervical back: Normal range of motion. No rigidity or tenderness.   Lymphadenopathy:      Cervical: No cervical adenopathy.   Neurological:      Mental Status: She is alert.            This note was completed in part utilizing Glide direct voice recognition software.   Grammatical errors, random word insertion, spelling mistakes, occasional wrong word or \"sound-alike\" substitutions and incomplete sentences may be an occasional consequence of the system secondary to software limitations, ambient noise and hardware issues. At the time of dictation, efforts were made to edit, clarify and /or correct errors.  Please read the chart carefully and recognize, using context, where substitutions have occurred.  If you have any questions or concerns about the context, text or information " contained within the body of this dictation, please contact myself, the provider, for further clarification.

## 2024-04-02 NOTE — LETTER
April 2, 2024     [facility]  [address]  [place] [address]    Patient: Pedro Avila   YOB: 1955   Date of Visit: 4/2/2024       Dear Dr. Moreau:    Thank you for referring Pedro Avila to me for evaluation. Below are my notes for this consultation.    If you have questions, please do not hesitate to call me. I look forward to following your patient along with you.         Sincerely,        Christine Blair MD        CC: No Recipients    Christine Blair MD  4/2/2024 11:48 AM  Incomplete                                             Cardiology Consultation     Pedro Avila  [id number]  1955  [facility] [address] [address]      Diagnoses and all orders for this visit:    Essential hypertension    Mixed hyperlipidemia    Palpitations      I had the pleasure of seeing Pedro Avila for a consultation regarding palpitaitons and PVCs requested by     History of the Presenting Illness, Discussion/Summary and my Plan are as follows:::    Cherelle Abdullahi is a pleasant 68-year-old lady with a history of hypertension and dyslipidemia-both treated, no tobacco use, no diabetes, no personal history of cardiac disease, who had sought attention for palpitations in the setting of increased family stress, leading to a 24-hour Holter that showed high PVC burden-10% of total beats, with short run of nonsustained VT leading to institution of metoprolol-currently at a dose of 25 mg daily.    Her family stressors have also decreased and with institution of metoprolol as well, her palpitations completely resolved and follow-up Holter-January 2024 showed decreased burden at 3.5% only.    She continues to regularly exercises-80 minutes, slow jog, stationary bike, without any limitations or any symptoms.    FH: dad - 'massive KOWALSKI' at 83, mom no heart issues, sibs and kids are well  Activity: 80 min of cardio a day - bike or slow  jog,no change in physical capacity. No ICDs or arrhythmias    She underwent an echocardiogram that was unremarkable-November 2023, a treadmill stress test-January 2024 that was false positive ECG, followed by a stress echo that continue to show ECG changes but stress echo itself was normal, with excellent functional capacity and no symptoms-January 2024.    She is being planned for cataract surgery left eye.    Plan:    Preoperative evaluation prior to cataract surgery-left eye: Can proceed at low cardiac risk without further cardiac testing.    High PVC burden with run of nonsustained VT: Continue metoprolol without changes, echo without any structural heart disease,  See above for ischemic evaluation-negative.  Etiology could be from increased stress and catecholamines  Repeat Holter monitoring in January 2024 on metoprolol 25 mg daily show decrease burden at 3.5%.   No changes at this time.     Hypertension: On valsartan and metoprolol, controlled at home, has white coat syndrome     Dyslipidemia: Reasonably controlled although the last 1 was a little higher.    Follow-up in 6 months     Latest Reference Range & Units 10/05/22 09:27 03/31/23 09:04 10/12/23 09:28   Cholesterol See Comment mg/dL 179 184 198   Triglycerides See Comment mg/dL 149 150 174 (H)   HDL >=50 mg/dL 51 58 62   Non-HDL Cholesterol mg/dl 128 126 136   LDL Calculated 0 - 100 mg/dL 98 96 101 (H)   (H): Data is abnormally high     Latest Reference Range & Units 10/05/22 09:27 03/31/23 09:04 10/12/23 09:28   Hemoglobin A1C  6.0 (H) 5.8 (H) 5.9 (H)   (H): Data is abnormally high   Latest Reference Range & Units 03/31/23 09:04 10/12/23 09:28   Potassium 3.5 - 5.3 mmol/L 3.8 4.5     1. Essential hypertension        2. Mixed hyperlipidemia        3. Palpitations          Patient Active Problem List   Diagnosis   • Essential hypertension   • Mixed hyperlipidemia   • Primary osteoarthritis involving multiple joints   • Eczema   • Biceps tendinitis of  right shoulder   • Need for vaccination   • Osteoporosis   • Arthritis of right glenohumeral joint   • Elevated fasting blood sugar   • Prediabetes   • Left foot pain   • Rash   • Palpitations   • Anxiety   • History of colon polyps     Past Medical History:   Diagnosis Date   • Allergic 2010    Hive reaction to Macrodantin, 2014 - reaction to shrimp   • Arthritis 2012?    take Meloxicam, Glucosamine & Chondroitin   • Hypertension 2000?    take hydrochlorothiazide, Valsartan & Atorvastatin   • Urinary tract infection 1975    Occasional bladder infections throughout adulthood     Social History     Socioeconomic History   • Marital status: /Civil Union     Spouse name: Not on file   • Number of children: Not on file   • Years of education: Not on file   • Highest education level: Not on file   Occupational History   • Not on file   Tobacco Use   • Smoking status: Never   • Smokeless tobacco: Never   Vaping Use   • Vaping status: Never Used   Substance and Sexual Activity   • Alcohol use: Yes     Alcohol/week: 1.0 standard drink of alcohol     Types: 1 Glasses of wine per week     Comment: Very limited. Less than 1 per week.   • Drug use: Never   • Sexual activity: Not Currently     Partners: Male     Birth control/protection: Post-menopausal, None   Other Topics Concern   • Not on file   Social History Narrative   • Not on file     Social Determinants of Health     Financial Resource Strain: Low Risk  (10/16/2023)    Overall Financial Resource Strain (CARDIA)    • Difficulty of Paying Living Expenses: Not hard at all   Food Insecurity: Not on file   Transportation Needs: No Transportation Needs (10/16/2023)    PRAPARE - Transportation    • Lack of Transportation (Medical): No    • Lack of Transportation (Non-Medical): No   Physical Activity: Not on file   Stress: Not on file   Social Connections: Not on file   Intimate Partner Violence: Not on file   Housing Stability: Not on file      Family History   Problem  Relation Age of Onset   • Depression Mother         Bi-polar   • Cancer Mother         Non-small cell lung cancer (non-smoker)   • Bipolar disorder Mother    • Dementia Maternal Grandmother    • Dementia Maternal Uncle    • Hypertension Father    • Heart disease Father    • Hearing loss Father    • Hypertension Brother    • Heart disease Maternal Grandfather    • Diabetes Maternal Grandfather    • Completed Suicide  Maternal Uncle    • No Known Problems Daughter    • No Known Problems Daughter    • No Known Problems Paternal Aunt      Past Surgical History:   Procedure Laterality Date   •  SECTION  1980   • EYE SURGERY  2020    cataract surgery R eye   • HYSTERECTOMY  2000   • OOPHORECTOMY     • PAROTIDECTOMY         Current Outpatient Medications:   •  atorvastatin (LIPITOR) 10 mg tablet, TAKE 1 TABLET BY MOUTH EVERY DAY, Disp: 90 tablet, Rfl: 1  •  cholecalciferol (VITAMIN D3) 400 units tablet, Take 400 Units by mouth, Disp: , Rfl:   •  clobetasol (TEMOVATE) 0.05 % ointment, Apply topically 2 (two) times a day, Disp: 45 g, Rfl: 3  •  Echinacea 125 MG CAPS, Take by mouth, Disp: , Rfl:   •  glucosamine-chondroitin 500-400 MG tablet, Take 2 tablets by mouth , Disp: , Rfl:   •  hydroCHLOROthiazide 25 mg tablet, TAKE 1 TABLET (25 MG TOTAL) BY MOUTH DAILY., Disp: 90 tablet, Rfl: 1  •  ketoconazole (NIZORAL) 2 % cream, Apply topically daily, Disp: 30 g, Rfl: 3  •  LORazepam (Ativan) 0.5 mg tablet, Take 1 tablet (0.5 mg total) by mouth every 8 (eight) hours as needed for anxiety, Disp: 60 tablet, Rfl: 1  •  metoprolol succinate (TOPROL-XL) 25 mg 24 hr tablet, Take 1 tablet (25 mg total) by mouth daily, Disp: 90 tablet, Rfl: 0  •  Multiple Vitamins-Minerals (MULTIVITAMIN ADULT EXTRA C PO), Take 1 tablet by mouth, Disp: , Rfl:   •  valsartan (DIOVAN) 160 mg tablet, TAKE 1 TABLET BY MOUTH EVERY DAY, Disp: 90 tablet, Rfl: 1  •  ascorbic acid (VITAMIN C) 500 MG tablet, Take 500 mg by mouth (Patient not  "taking: Reported on 1/15/2024), Disp: , Rfl:   Allergies   Allergen Reactions   • Nitrofurantoin Hives   • Shrimp Extract Allergy Skin Test - Food Allergy Hives     Scratchy throat    • Pneumococcal Vaccine Other (See Comments)   • Shellfish Allergy - Food Allergy Hives   • Sulfa Antibiotics Hives, Itching and Lip Swelling     Vitals:    04/02/24 1115   BP: 126/80   BP Location: Left arm   Patient Position: Sitting   Cuff Size: Adult   Pulse: 74   SpO2: 100%   Weight: 61.2 kg (135 lb)   Height: 5' 1\" (1.549 m)         Imaging: Mammo screening bilateral w 3d & cad    Result Date: 12/26/2023  Narrative: DIAGNOSIS: Visit for screening mammogram TECHNIQUE: Digital screening mammography was performed. Computer Aided Detection (CAD) analyzed all applicable images. COMPARISONS: Prior breast imaging dated: 12/20/2022, 09/09/2021, 09/02/2021, 09/07/2019, 09/05/2018, 07/13/2017, 02/17/2016, and 11/25/2014 RELEVANT HISTORY: Family Breast Cancer History: No known family history of breast cancer. Family Medical History: No known relevant family medical history. Personal History: Hormone history includes birth control and estrogen replacement therapy. Surgical history includes hysterectomy and oophorectomy. No known relevant medical history. The patient is scheduled in a reminder system for screening mammography. 8-10% of cancers will be missed on mammography. Management of a palpable abnormality must be based on clinical grounds.  Patients will be notified of their results via letter from our facility. Accredited by [employer] and FDA. RISK ASSESSMENT: 5 Year Tyrer-Cuzick: 1.04% 10 Year Tyrer-Cuzick: 2.21% Lifetime Tyrer-Cuzick: 3.51% TISSUE DENSITY: There are scattered areas of fibroglandular density. INDICATION: Pedro Avila is a 68 y.o. female presenting for screening mammography. FINDINGS: Bilateral There are no suspicious masses, grouped microcalcifications or areas of unexplained architectural " "distortion. The skin and nipple areolar complex are unremarkable.  There are scattered calcifications present.     Impression: No mammographic evidence of malignancy. ASSESSMENT/BI-RADS CATEGORY: Left: 2 - Benign Right: 2 - Benign Overall: 2 - Benign RECOMMENDATION:      - Routine screening mammogram in 1 year for both breasts. Workstation ID: [id number]       Review of Systems:  Review of Systems   Constitutional: Negative.    HENT: Negative.     Eyes: Negative.    Respiratory: Negative.     Cardiovascular:  Positive for palpitations.   Endocrine: Negative.    Musculoskeletal: Negative.        Physical Exam:    /80 (BP Location: Left arm, Patient Position: Sitting, Cuff Size: Adult)   Pulse 74   Ht 5' 1\" (1.549 m)   Wt 61.2 kg (135 lb)   SpO2 100%   BMI 25.51 kg/m²   Physical Exam  Constitutional:       General: She is not in acute distress.     Appearance: She is not ill-appearing, toxic-appearing or diaphoretic.   HENT:      Nose: Nose normal. No congestion or rhinorrhea.      Mouth/Throat:      Mouth: Mucous membranes are moist.   Neck:      Vascular: No carotid bruit.   Cardiovascular:      Rate and Rhythm: Normal rate and regular rhythm.      Heart sounds: No murmur heard.     No friction rub. No gallop.   Pulmonary:      Effort: Pulmonary effort is normal. No respiratory distress.      Breath sounds: No stridor. No wheezing or rhonchi.   Abdominal:      General: Abdomen is flat. Bowel sounds are normal. There is no distension.      Palpations: There is no mass.      Tenderness: There is no abdominal tenderness.      Hernia: No hernia is present.   Musculoskeletal:         General: No swelling, tenderness, deformity or signs of injury. Normal range of motion.      Cervical back: Normal range of motion. No rigidity or tenderness.   Lymphadenopathy:      Cervical: No cervical adenopathy.   Neurological:      Mental Status: She is alert.            This note was completed in part utilizing m-modal " "fluency direct voice recognition software.   Grammatical errors, random word insertion, spelling mistakes, occasional wrong word or \"sound-alike\" substitutions and incomplete sentences may be an occasional consequence of the system secondary to software limitations, ambient noise and hardware issues. At the time of dictation, efforts were made to edit, clarify and /or correct errors.  Please read the chart carefully and recognize, using context, where substitutions have occurred.  If you have any questions or concerns about the context, text or information contained within the body of this dictation, please contact myself, the provider, for further clarification.  "

## 2024-04-04 ENCOUNTER — APPOINTMENT (OUTPATIENT)
Dept: LAB | Facility: CLINIC | Age: 69
End: 2024-04-04
Payer: COMMERCIAL

## 2024-04-04 DIAGNOSIS — E78.2 MIXED HYPERLIPIDEMIA: ICD-10-CM

## 2024-04-04 DIAGNOSIS — R73.03 PREDIABETES: ICD-10-CM

## 2024-04-04 LAB
ALBUMIN SERPL BCP-MCNC: 4.2 G/DL (ref 3.5–5)
ALP SERPL-CCNC: 42 U/L (ref 34–104)
ALT SERPL W P-5'-P-CCNC: 19 U/L (ref 7–52)
ANION GAP SERPL CALCULATED.3IONS-SCNC: 9 MMOL/L (ref 4–13)
AST SERPL W P-5'-P-CCNC: 23 U/L (ref 13–39)
BILIRUB SERPL-MCNC: 0.52 MG/DL (ref 0.2–1)
BUN SERPL-MCNC: 18 MG/DL (ref 5–25)
CALCIUM SERPL-MCNC: 9.2 MG/DL (ref 8.4–10.2)
CHLORIDE SERPL-SCNC: 101 MMOL/L (ref 96–108)
CHOLEST SERPL-MCNC: 190 MG/DL
CO2 SERPL-SCNC: 29 MMOL/L (ref 21–32)
CREAT SERPL-MCNC: 0.71 MG/DL (ref 0.6–1.3)
EST. AVERAGE GLUCOSE BLD GHB EST-MCNC: 123 MG/DL
GFR SERPL CREATININE-BSD FRML MDRD: 86 ML/MIN/1.73SQ M
GLUCOSE P FAST SERPL-MCNC: 83 MG/DL (ref 65–99)
HBA1C MFR BLD: 5.9 %
HDLC SERPL-MCNC: 64 MG/DL
LDLC SERPL CALC-MCNC: 98 MG/DL (ref 0–100)
NONHDLC SERPL-MCNC: 126 MG/DL
POTASSIUM SERPL-SCNC: 4.2 MMOL/L (ref 3.5–5.3)
PROT SERPL-MCNC: 6.8 G/DL (ref 6.4–8.4)
SODIUM SERPL-SCNC: 139 MMOL/L (ref 135–147)
TRIGL SERPL-MCNC: 140 MG/DL

## 2024-04-04 PROCEDURE — 36415 COLL VENOUS BLD VENIPUNCTURE: CPT

## 2024-04-04 PROCEDURE — 80053 COMPREHEN METABOLIC PANEL: CPT

## 2024-04-04 PROCEDURE — 83036 HEMOGLOBIN GLYCOSYLATED A1C: CPT

## 2024-04-04 PROCEDURE — 80061 LIPID PANEL: CPT

## 2024-04-08 ENCOUNTER — ANESTHESIA EVENT (OUTPATIENT)
Dept: GASTROENTEROLOGY | Facility: HOSPITAL | Age: 69
End: 2024-04-08

## 2024-04-08 ENCOUNTER — ANESTHESIA (OUTPATIENT)
Dept: GASTROENTEROLOGY | Facility: HOSPITAL | Age: 69
End: 2024-04-08

## 2024-04-08 ENCOUNTER — HOSPITAL ENCOUNTER (OUTPATIENT)
Dept: GASTROENTEROLOGY | Facility: HOSPITAL | Age: 69
Setting detail: OUTPATIENT SURGERY
Discharge: HOME | End: 2024-04-08
Payer: COMMERCIAL

## 2024-04-08 VITALS
RESPIRATION RATE: 18 BRPM | HEART RATE: 52 BPM | DIASTOLIC BLOOD PRESSURE: 65 MMHG | OXYGEN SATURATION: 100 % | SYSTOLIC BLOOD PRESSURE: 125 MMHG | TEMPERATURE: 97.1 F

## 2024-04-08 DIAGNOSIS — Z86.010 HISTORY OF COLON POLYPS: ICD-10-CM

## 2024-04-08 RX ORDER — SODIUM CHLORIDE, SODIUM LACTATE, POTASSIUM CHLORIDE, CALCIUM CHLORIDE 600; 310; 30; 20 MG/100ML; MG/100ML; MG/100ML; MG/100ML
INJECTION, SOLUTION INTRAVENOUS CONTINUOUS PRN
Status: DISCONTINUED | OUTPATIENT
Start: 2024-04-08 | End: 2024-04-08

## 2024-04-08 RX ORDER — PROPOFOL 10 MG/ML
INJECTION, EMULSION INTRAVENOUS AS NEEDED
Status: DISCONTINUED | OUTPATIENT
Start: 2024-04-08 | End: 2024-04-08

## 2024-04-08 RX ADMIN — PROPOFOL 50 MG: 10 INJECTION, EMULSION INTRAVENOUS at 12:05

## 2024-04-08 RX ADMIN — SODIUM CHLORIDE, SODIUM LACTATE, POTASSIUM CHLORIDE, AND CALCIUM CHLORIDE: .6; .31; .03; .02 INJECTION, SOLUTION INTRAVENOUS at 11:53

## 2024-04-08 RX ADMIN — PROPOFOL 80 MG: 10 INJECTION, EMULSION INTRAVENOUS at 11:58

## 2024-04-08 RX ADMIN — PROPOFOL 50 MG: 10 INJECTION, EMULSION INTRAVENOUS at 12:01

## 2024-04-08 NOTE — ANESTHESIA PREPROCEDURE EVALUATION
Procedure:  COLONOSCOPY    Relevant Problems   CARDIO   (+) Essential hypertension   (+) Mixed hyperlipidemia      MUSCULOSKELETAL   (+) Arthritis of right glenohumeral joint   (+) Primary osteoarthritis involving multiple joints      NEURO/PSYCH   (+) Anxiety      Left Ventricle: Left ventricular cavity size is normal. Wall thickness  is normal. The left ventricular ejection fraction is 60%. Systolic  function is normal. Wall motion is normal. Diastolic function is normal.    Mitral Valve: There is mild regurgitation.    Tricuspid Valve: There is mild to moderate regurgitation. The right  ventricular systolic pressure is normal. The estimated right ventricular  systolic pressure is 31.00 mmHg.  Less        Physical Exam    Airway    Mallampati score: II  TM Distance: >3 FB  Neck ROM: full     Dental       Cardiovascular      Pulmonary      Other Findings  post-pubertal.      Anesthesia Plan  ASA Score- 2     Anesthesia Type- IV sedation with anesthesia with ASA Monitors.         Additional Monitors:     Airway Plan:            Plan Factors-    Chart reviewed.                      Induction- intravenous.    Postoperative Plan-     Informed Consent- Anesthetic plan and risks discussed with patient.  I personally reviewed this patient with the CRNA. Discussed and agreed on the Anesthesia Plan with the CRNA..

## 2024-04-08 NOTE — H&P
History and Physical -  [facility]  Pedro Avila 68 y.o. female MRN: [id number]        HPI: 68-year-old female with history of colon polyps.  Regular bowel movements.    Historical Information   Past Medical History:   Diagnosis Date    Allergic     Hive reaction to Macrodantin, 2014 - reaction to shrimp    Arthritis ?    take Meloxicam, Glucosamine & Chondroitin    Hypertension 2000?    take hydrochlorothiazide, Valsartan & Atorvastatin    Urinary tract infection 1975    Occasional bladder infections throughout adulthood     Past Surgical History:   Procedure Laterality Date     SECTION  1980    EYE SURGERY  2020    cataract surgery R eye    HYSTERECTOMY  2000    OOPHORECTOMY      PAROTIDECTOMY       Social History   Social History     Substance and Sexual Activity   Alcohol Use Yes    Alcohol/week: 1.0 standard drink of alcohol    Types: 1 Glasses of wine per week    Comment: Very limited. Less than 1 per week.     Social History     Substance and Sexual Activity   Drug Use Never     Social History     Tobacco Use   Smoking Status Never   Smokeless Tobacco Never     Family History   Problem Relation Age of Onset    Depression Mother         Bi-polar    Cancer Mother         Non-small cell lung cancer (non-smoker)    Bipolar disorder Mother     Dementia Maternal Grandmother     Dementia Maternal Uncle     Hypertension Father     Heart disease Father     Hearing loss Father     Hypertension Brother     Heart disease Maternal Grandfather     Diabetes Maternal Grandfather     Completed Suicide  Maternal Uncle     No Known Problems Daughter     No Known Problems Daughter     No Known Problems Paternal Aunt        Meds/Allergies     (Not in a hospital admission)      Allergies   Allergen Reactions    Nitrofurantoin Hives    Shrimp Extract Allergy Skin Test - Food Allergy Hives     Scratchy throat     Pneumococcal Vaccine Other (See Comments)    Shellfish Allergy - Food  Allergy Hives    Sulfa Antibiotics Hives, Itching and Lip Swelling       Objective     not currently breastfeeding.    Physical Exam:    Chest- CTA  Heart- RRR  Abdomen- NT/ND  Extremities- No edema    ASSESSMENT:     History of colon polyps    PLAN:    Colonoscopy

## 2024-04-09 DIAGNOSIS — R00.2 PALPITATIONS: ICD-10-CM

## 2024-04-09 DIAGNOSIS — R94.31 ABNORMAL EKG: ICD-10-CM

## 2024-04-09 RX ORDER — METOPROLOL SUCCINATE 25 MG/1
25 TABLET, EXTENDED RELEASE ORAL DAILY
Qty: 90 TABLET | Refills: 1 | Status: SHIPPED | OUTPATIENT
Start: 2024-04-09

## 2024-04-12 ENCOUNTER — RA CDI HCC (OUTPATIENT)
Dept: OTHER | Facility: HOSPITAL | Age: 69
End: 2024-04-12

## 2024-04-18 ENCOUNTER — OFFICE VISIT (OUTPATIENT)
Dept: FAMILY MEDICINE CLINIC | Facility: CLINIC | Age: 69
End: 2024-04-18
Payer: COMMERCIAL

## 2024-04-18 VITALS
DIASTOLIC BLOOD PRESSURE: 80 MMHG | HEART RATE: 67 BPM | OXYGEN SATURATION: 98 % | WEIGHT: 138 LBS | HEIGHT: 61 IN | BODY MASS INDEX: 26.06 KG/M2 | SYSTOLIC BLOOD PRESSURE: 132 MMHG

## 2024-04-18 DIAGNOSIS — E78.2 MIXED HYPERLIPIDEMIA: ICD-10-CM

## 2024-04-18 DIAGNOSIS — R73.03 PREDIABETES: ICD-10-CM

## 2024-04-18 DIAGNOSIS — I10 ESSENTIAL HYPERTENSION: Primary | ICD-10-CM

## 2024-04-18 DIAGNOSIS — F41.9 ANXIETY: ICD-10-CM

## 2024-04-18 DIAGNOSIS — R21 SKIN RASH: ICD-10-CM

## 2024-04-18 PROCEDURE — 99214 OFFICE O/P EST MOD 30 MIN: CPT | Performed by: FAMILY MEDICINE

## 2024-04-18 PROCEDURE — G2211 COMPLEX E/M VISIT ADD ON: HCPCS | Performed by: FAMILY MEDICINE

## 2024-04-18 NOTE — PROGRESS NOTES
Subjective:      Patient ID: Pedro Avila is a 69 y.o. female.    HPI  Patient is here for routine 6-month follow-up   Patient has history of hypertension, dyslipidemia, prediabetes.  Metabolic labs reviewed with patient.  Noted to have A1c of 5.9.  LDL is at goal.  Renal function stable.    Evaluated by cardiology for palpitations in the setting of increased family stress, leading to a 24-hour Holter that showed high PVC burden-10% of total beats, with short run of nonsustained VT leading to institution of metoprolol-currently at a dose of 25 mg daily.   Anxiety stable now, not on any medications.     Past Medical History:   Diagnosis Date    Allergic     Hive reaction to Macrodantin,  - reaction to shrimp    Arthritis ?    take Meloxicam, Glucosamine & Chondroitin    Hypertension ?    take hydrochlorothiazide, Valsartan & Atorvastatin    Urinary tract infection 1975    Occasional bladder infections throughout adulthood       Family History   Problem Relation Age of Onset    Depression Mother         Bi-polar    Cancer Mother         Non-small cell lung cancer (non-smoker)    Bipolar disorder Mother     Dementia Maternal Grandmother     Dementia Maternal Uncle     Hypertension Father     Heart disease Father     Hearing loss Father     Hypertension Brother     Heart disease Maternal Grandfather     Diabetes Maternal Grandfather     Completed Suicide  Maternal Uncle     No Known Problems Daughter     No Known Problems Daughter     No Known Problems Paternal Aunt        Past Surgical History:   Procedure Laterality Date     SECTION  1980    EYE SURGERY  2020    cataract surgery R eye    HYSTERECTOMY  2000    OOPHORECTOMY      PAROTIDECTOMY          reports that she has never smoked. She has never used smokeless tobacco. She reports current alcohol use of about 1.0 standard drink of alcohol per week. She reports that she does not use drugs.      Current Outpatient Medications:      "atorvastatin (LIPITOR) 10 mg tablet, TAKE 1 TABLET BY MOUTH EVERY DAY, Disp: 90 tablet, Rfl: 1    cholecalciferol (VITAMIN D3) 400 units tablet, Take 400 Units by mouth, Disp: , Rfl:     clobetasol (TEMOVATE) 0.05 % ointment, Apply topically 2 (two) times a day, Disp: 45 g, Rfl: 3    Echinacea 125 MG CAPS, Take by mouth, Disp: , Rfl:     glucosamine-chondroitin 500-400 MG tablet, Take 2 tablets by mouth , Disp: , Rfl:     hydroCHLOROthiazide 25 mg tablet, TAKE 1 TABLET (25 MG TOTAL) BY MOUTH DAILY., Disp: 90 tablet, Rfl: 1    ketoconazole (NIZORAL) 2 % cream, Apply topically daily, Disp: 30 g, Rfl: 3    LORazepam (Ativan) 0.5 mg tablet, Take 1 tablet (0.5 mg total) by mouth every 8 (eight) hours as needed for anxiety, Disp: 60 tablet, Rfl: 1    metoprolol succinate (TOPROL-XL) 25 mg 24 hr tablet, TAKE 1 TABLET (25 MG TOTAL) BY MOUTH DAILY., Disp: 90 tablet, Rfl: 1    Multiple Vitamins-Minerals (MULTIVITAMIN ADULT EXTRA C PO), Take 1 tablet by mouth, Disp: , Rfl:     valsartan (DIOVAN) 160 mg tablet, TAKE 1 TABLET BY MOUTH EVERY DAY, Disp: 90 tablet, Rfl: 1    ascorbic acid (VITAMIN C) 500 MG tablet, Take 500 mg by mouth (Patient not taking: Reported on 1/15/2024), Disp: , Rfl:     The following portions of the patient's history were reviewed and updated as appropriate: allergies, current medications, past family history, past medical history, past social history, past surgical history and problem list.    Review of Systems   Constitutional: Negative.    Respiratory: Negative.     Cardiovascular: Negative.            Objective:    /80   Pulse 67   Ht 5' 1\" (1.549 m)   Wt 62.6 kg (138 lb)   SpO2 98%   BMI 26.07 kg/m²      Physical Exam  Constitutional:       Appearance: Normal appearance.   Cardiovascular:      Heart sounds: Normal heart sounds.   Pulmonary:      Breath sounds: Normal breath sounds.   Abdominal:      Palpations: Abdomen is soft.           Recent Results (from the past 1008 hour(s)) "   Comprehensive metabolic panel    Collection Time: 04/04/24  9:38 AM   Result Value Ref Range    Sodium 139 135 - 147 mmol/L    Potassium 4.2 3.5 - 5.3 mmol/L    Chloride 101 96 - 108 mmol/L    CO2 29 21 - 32 mmol/L    ANION GAP 9 4 - 13 mmol/L    BUN 18 5 - 25 mg/dL    Creatinine 0.71 0.60 - 1.30 mg/dL    Glucose, Fasting 83 65 - 99 mg/dL    Calcium 9.2 8.4 - 10.2 mg/dL    AST 23 13 - 39 U/L    ALT 19 7 - 52 U/L    Alkaline Phosphatase 42 34 - 104 U/L    Total Protein 6.8 6.4 - 8.4 g/dL    Albumin 4.2 3.5 - 5.0 g/dL    Total Bilirubin 0.52 0.20 - 1.00 mg/dL    eGFR 86 ml/min/1.73sq m   Hemoglobin A1C    Collection Time: 04/04/24  9:38 AM   Result Value Ref Range    Hemoglobin A1C 5.9 (H) Normal 4.0-5.6%; PreDiabetic 5.7-6.4%; Diabetic >=6.5%; Glycemic control for adults with diabetes <7.0% %     mg/dl   Lipid panel    Collection Time: 04/04/24  9:38 AM   Result Value Ref Range    Cholesterol 190 See Comment mg/dL    Triglycerides 140 See Comment mg/dL    HDL, Direct 64 >=50 mg/dL    LDL Calculated 98 0 - 100 mg/dL    Non-HDL-Chol (CHOL-HDL) 126 mg/dl       Assessment/Plan:    No problem-specific Assessment & Plan notes found for this encounter.           Problem List Items Addressed This Visit          Cardiovascular and Mediastinum    Essential hypertension - Primary     Patient's blood pressure is at goal.  Continue valsartan 160, hydrochlorothiazide 25 mg.             Musculoskeletal and Integument    Skin rash     With use of a particular type of shoe. Recommended use of antihistamine before using the footwear and see if that helps.             Behavioral Health    Anxiety     On Ativan 0.5 mg prn use only. Patient will call back for refill.              Other    Mixed hyperlipidemia     LDL is at goal.  Continue atorvastatin 10 milligram.  Metabolic labs/follow-up at 6 month.           Relevant Orders    Lipid panel    Prediabetes     A1c improved, 5.9 with improved lifestyle.  Continue same.  Continue  monitoring with metabolic labs at 6 months.         Relevant Orders    Comprehensive metabolic panel    Hemoglobin A1C

## 2024-04-18 NOTE — ASSESSMENT & PLAN NOTE
With use of a particular type of shoe. Recommended use of antihistamine before using the footwear and see if that helps.

## 2024-05-07 ENCOUNTER — OFFICE VISIT (OUTPATIENT)
Dept: DERMATOLOGY | Facility: CLINIC | Age: 69
End: 2024-05-07
Payer: COMMERCIAL

## 2024-05-07 VITALS — TEMPERATURE: 97.9 F | BODY MASS INDEX: 26.81 KG/M2 | WEIGHT: 142 LBS | HEIGHT: 61 IN

## 2024-05-07 DIAGNOSIS — B07.9 VERRUCA VULGARIS: Primary | ICD-10-CM

## 2024-05-07 PROCEDURE — 17110 DESTRUCTION B9 LES UP TO 14: CPT | Performed by: DERMATOLOGY

## 2024-05-07 PROCEDURE — 11900 INJECT SKIN LESIONS </W 7: CPT | Performed by: DERMATOLOGY

## 2024-05-07 RX ORDER — KETOROLAC TROMETHAMINE 5 MG/ML
SOLUTION OPHTHALMIC
COMMUNITY
Start: 2024-04-15

## 2024-05-07 RX ORDER — PREDNISOLONE ACETATE 10 MG/ML
SUSPENSION/ DROPS OPHTHALMIC
COMMUNITY
Start: 2024-04-16

## 2024-05-07 RX ORDER — CANDIDA ALBICANS 1000 [PNU]/ML
0.1 INJECTION, SOLUTION INTRADERMAL ONCE
Status: COMPLETED | OUTPATIENT
Start: 2024-05-07 | End: 2024-05-07

## 2024-05-07 RX ADMIN — CANDIDA ALBICANS 0.1 ML: 1000 INJECTION, SOLUTION INTRADERMAL at 10:16

## 2024-05-07 NOTE — PROGRESS NOTES
"[facility] Dermatology Clinic Note     Patient Name: Pedro Avila  Encounter Date: 5/7/24     Have you been cared for by a [facility] Dermatologist in the last 3 years and, if so, which description applies to you?    Yes.  I have been here within the last 3 years, and my medical history has NOT changed since that time.  I am FEMALE/of child-bearing potential.    REVIEW OF SYSTEMS:  Have you recently had or currently have any of the following? No changes in my recent health.   PAST MEDICAL HISTORY:  Have you personally ever had or currently have any of the following?  If \"YES,\" then please provide more detail. No changes in my medical history.   HISTORY OF IMMUNOSUPPRESSION: Do you have a history of any of the following:  Systemic Immunosuppression such as Diabetes, Biologic or Immunotherapy, Chemotherapy, Organ Transplantation, Bone Marrow Transplantation?  No     Answering \"YES\" requires the addition of the dotphrase \"IMMUNOSUPPRESSED\" as the first diagnosis of the patient's visit.   FAMILY HISTORY:  Any \"first degree relatives\" (parent, brother, sister, or child) with the following?    No changes in my family's known health.   PATIENT EXPERIENCE:    Do you want the Dermatologist to perform a COMPLETE skin exam today including a clinical examination under the \"bra and underwear\" areas?  NO  If necessary, do we have your permission to call and leave a detailed message on your Preferred Phone number that includes your specific medical information?  Yes      Allergies   Allergen Reactions    Nitrofurantoin Hives    Shrimp Extract Allergy Skin Test - Food Allergy Hives     Scratchy throat     Pneumococcal Vaccine Other (See Comments)    Shellfish Allergy - Food Allergy Hives    Sulfa Antibiotics Hives, Itching and Lip Swelling      Current Outpatient Medications:     atorvastatin (LIPITOR) 10 mg tablet, TAKE 1 TABLET BY MOUTH EVERY DAY, Disp: 90 tablet, Rfl: 1    clobetasol (TEMOVATE) 0.05 % ointment, Apply topically 2 " "(two) times a day (Patient taking differently: Apply topically 2 (two) times a day As needed), Disp: 45 g, Rfl: 3    Echinacea 125 MG CAPS, Take by mouth, Disp: , Rfl:     glucosamine-chondroitin 500-400 MG tablet, Take 2 tablets by mouth , Disp: , Rfl:     hydroCHLOROthiazide 25 mg tablet, TAKE 1 TABLET (25 MG TOTAL) BY MOUTH DAILY., Disp: 90 tablet, Rfl: 1    ketoconazole (NIZORAL) 2 % cream, Apply topically daily (Patient taking differently: Apply topically daily As needed), Disp: 30 g, Rfl: 3    ketorolac (ACULAR) 0.5 % ophthalmic solution, , Disp: , Rfl:     LORazepam (Ativan) 0.5 mg tablet, Take 1 tablet (0.5 mg total) by mouth every 8 (eight) hours as needed for anxiety (Patient taking differently: Take 0.5 mg by mouth every 8 (eight) hours as needed for anxiety As needed), Disp: 60 tablet, Rfl: 1    metoprolol succinate (TOPROL-XL) 25 mg 24 hr tablet, TAKE 1 TABLET (25 MG TOTAL) BY MOUTH DAILY., Disp: 90 tablet, Rfl: 1    Multiple Vitamins-Minerals (MULTIVITAMIN ADULT EXTRA C PO), Take 1 tablet by mouth, Disp: , Rfl:     prednisoLONE acetate (PRED FORTE) 1 % ophthalmic suspension, , Disp: , Rfl:     valsartan (DIOVAN) 160 mg tablet, TAKE 1 TABLET BY MOUTH EVERY DAY, Disp: 90 tablet, Rfl: 1    ascorbic acid (VITAMIN C) 500 MG tablet, Take 500 mg by mouth (Patient not taking: Reported on 1/15/2024), Disp: , Rfl:     cholecalciferol (VITAMIN D3) 400 units tablet, Take 400 Units by mouth (Patient not taking: Reported on 5/7/2024), Disp: , Rfl:          Patient is using cereve moisturizer on hands but still peels and is dry and flaky.    Whom besides the patient is providing clinical information about today's encounter?   NO ADDITIONAL HISTORIAN (patient alone provided history)    Physical Exam and Assessment/Plan by Diagnosis:        VERUCCA VULGARIS (\"COMMON WART\")    Physical Exam:  Anatomic Location: left heel  Morphologic Description:  verrucous papule  Pertinent Positives:  Pertinent " Negatives:    Additional History of Present Condition:  Patient believes that the wart has shrunk but still there. Has been using 40% salicylic acid and filing it down for a few months now.    Plan:  Discussed this condition - while contagious - is very common and nearly harmless.  It is caused by an infection with human papillomavirus (HPV).  It is most common in school-aged children; however, warts may occur at any age.  They are also seen in greater frequency in the setting of other dermatitis (due to a defective skin barrier) and immunosuppression (e.g., from medications or HIV infection).  Warts are spread by direct skin-to-skin contact or auto-inoculation if a wart is scratched or picked.  The incubation period may be 12+ months depending on the amount of virus inoculated.  Treatments usually make the wart smaller and less uncomfortable and many times leads to total resolution. In children - even without treatment - most warts (up to 90%) may resolve within 2 years.  Warts may be more persistent in adults.  RONNA ANTIGEN IMMUNOTHERAPY.  The presumed mechanism of action is the induction of a systemic T-cell mediated response; cytokines released from Th1 cells such as interleukin-2 and interferon-gamma are increased in response to the injected antigen. In one large study of 220 children (age 3-18 years) with recalcitrant or multiple warts, about 70% had complete resolution with an average of 2.73 treatments.  Adverse effects may include itching, pain (immediately and up to 24 hours following injections), local reactions (burning, blistering, peeling), redness, and swelling.  Rarely, febrile reactions, muscle aches, redness, swelling at the injection site (and subsequent compartment syndrome) more serious immune reactions may occur.  It is suggested that the patient remain for a brief period of observation following administration of Candida antigen.  SEE PROCEDURE NOTE BELOW.  CRYOTHERAPY.  Patient/family opts  "to treat the warts with liquid nitrogen.  Usually this is done at 2- to 4-week intervals.  It destroys the outer layer of skin and causes peeling.  It is uncomfortable and may result in blistering for several days or longer.  It may also result in skin color changes (lightening or darkening of the skin) and even numbness if performed over a superficial nerve such as the side of a finger.  It may also result in permanent nail injury/dystrophy if the nail matrix is damaged during freezing.  Success rates are around 70% after 3 to 4 months of regular freezing sessions.  SEE PROCEDURE NOTE BELOW.         Follow up in 1 month.            PROCEDURES PERFORMED TODAY ASSOCIATED WITH THIS CONDITION:          PROCEDURE:  INTRALESIONAL RONNA ANTIGEN    Purpose: Candida antigen is used \"off label\" as an immunotherapy agent in the treatment of warts. This is widely used technique endorsed by many pediatric dermatologists because of its utility in treating multiple lesions with minimal pain and discomfort and resulting sequelae to the treated areas.    Indications: It is used \"off label\" for the treatment of warts.     Potential Side Effects: The patient signifies understanding that Candida antigen injection can potentially cause early and/or delayed adverse effects such as:    Pain    Local immune response    Bleeding    Skin discoloration   Swelling    Flu-like illness with increased lymphnodes   Serious allergic reaction (anaphylaxis)    After verbal and written consent were obtained, the to-be-treated area was wiped and cleaned with rubbing alcohol 70%.          Then, a total of 0.2 mL of refrigerated Candida antigen (Lot# ; Expiration 05/17/2025, NDC#: 53358-698-14) was injected intralesionally into a total of 1 lesion/s on the following anatomic areas:  Left heel using a 1-mL tuberculin syringe and a 30-gauge needle.      There was less than 1 mL of blood loss and little to no discomfort.  The area was bandaged with " a Band-aid.  The patient tolerated the procedure well and remained in the office for observation.  With no signs of an adverse reaction, the patient was eventually discharged from clinic.      PROCEDURE:  DESTRUCTION OF BENIGN LESIONS WITH CRYOTHERAPY  After a thorough discussion of treatment options and risk/benefits/alternatives (including but not limited to local pain, scarring, dyspigmentation, blistering, and possible superinfection), verbal and written consent were obtained and the aforementioned lesions were treated on with cryotherapy using liquid nitrogen x 1 cycle for 5-10 seconds.    TOTAL NUMBER of 1 lesions were treated today on the ANATOMIC LOCATION: left heel.    The patient tolerated the procedure well, and after-care instructions were provided.              Scribe Attestation      I,:  Elaine Carbajal am acting as a scribe while in the presence of the attending physician.:       I,:  Shira Palomino MD personally performed the services described in this documentation    as scribed in my presence.:

## 2024-07-12 ENCOUNTER — TELEPHONE (OUTPATIENT)
Age: 69
End: 2024-07-12

## 2024-07-12 DIAGNOSIS — B07.9 VERRUCA VULGARIS: Primary | ICD-10-CM

## 2024-07-12 NOTE — TELEPHONE ENCOUNTER
Patient called to reschedule her August APT/apt scheduled 7/23/24    Patient also would like to check with Dr Palomino if she can send her the stronger medication she discussed at her last visit(not sure of the name of it) before coming in for her apt or if this can wait to be discussed again at time of visit.    Thank you

## 2024-07-16 RX ORDER — FLUOROURACIL 50 MG/G
CREAM TOPICAL
Qty: 15 G | Refills: 2 | Status: SHIPPED | OUTPATIENT
Start: 2024-07-16

## 2024-07-16 NOTE — TELEPHONE ENCOUNTER
Pt calling to verify message from [employer] [facility] is legit    Confirmed with pt, Dr Palomino's message in [employer]

## 2024-07-23 ENCOUNTER — OFFICE VISIT (OUTPATIENT)
Dept: DERMATOLOGY | Facility: CLINIC | Age: 69
End: 2024-07-23
Payer: COMMERCIAL

## 2024-07-23 VITALS — WEIGHT: 140 LBS | TEMPERATURE: 98.1 F | BODY MASS INDEX: 26.43 KG/M2 | HEIGHT: 61 IN

## 2024-07-23 DIAGNOSIS — B07.9 VERRUCA VULGARIS: Primary | ICD-10-CM

## 2024-07-23 PROCEDURE — 17110 DESTRUCTION B9 LES UP TO 14: CPT | Performed by: DERMATOLOGY

## 2024-07-23 RX ORDER — CANDIDA ALBICANS 1000 [PNU]/ML
0.1 INJECTION, SOLUTION INTRADERMAL ONCE
Status: COMPLETED | OUTPATIENT
Start: 2024-07-23 | End: 2024-07-23

## 2024-07-23 RX ADMIN — CANDIDA ALBICANS 0.1 ML: 1000 INJECTION, SOLUTION INTRADERMAL at 13:58

## 2024-07-23 NOTE — PROGRESS NOTES
"[facility] Dermatology Clinic Note     Patient Name: Pedro Avila  Encounter Date: 7/23/2024     Have you been cared for by a [facility] Dermatologist in the last 3 years and, if so, which description applies to you?    Yes.  I have been here within the last 3 years, and my medical history has NOT changed since that time.  I am FEMALE/of child-bearing potential.    REVIEW OF SYSTEMS:  Have you recently had or currently have any of the following? No changes in my recent health.   PAST MEDICAL HISTORY:  Have you personally ever had or currently have any of the following?  If \"YES,\" then please provide more detail. No changes in my medical history.   HISTORY OF IMMUNOSUPPRESSION: Do you have a history of any of the following:  Systemic Immunosuppression such as Diabetes, Biologic or Immunotherapy, Chemotherapy, Organ Transplantation, Bone Marrow Transplantation?  No     Answering \"YES\" requires the addition of the dotphrase \"IMMUNOSUPPRESSED\" as the first diagnosis of the patient's visit.   FAMILY HISTORY:  Any \"first degree relatives\" (parent, brother, sister, or child) with the following?    No changes in my family's known health.   PATIENT EXPERIENCE:    Do you want the Dermatologist to perform a COMPLETE skin exam today including a clinical examination under the \"bra and underwear\" areas?  NO  If necessary, do we have your permission to call and leave a detailed message on your Preferred Phone number that includes your specific medical information?  Yes      Allergies   Allergen Reactions    Nitrofurantoin Hives    Shrimp Extract Allergy Skin Test - Food Allergy Hives     Scratchy throat     Pneumococcal Vaccine Other (See Comments)    Shellfish Allergy - Food Allergy Hives    Sulfa Antibiotics Hives, Itching and Lip Swelling      Current Outpatient Medications:     ascorbic acid (VITAMIN C) 500 MG tablet, Take 500 mg by mouth (Patient not taking: Reported on 1/15/2024), Disp: , Rfl:     atorvastatin (LIPITOR) 10 " "mg tablet, TAKE 1 TABLET BY MOUTH EVERY DAY, Disp: 90 tablet, Rfl: 1    cholecalciferol (VITAMIN D3) 400 units tablet, Take 400 Units by mouth (Patient not taking: Reported on 5/7/2024), Disp: , Rfl:     clobetasol (TEMOVATE) 0.05 % ointment, Apply topically 2 (two) times a day (Patient taking differently: Apply topically 2 (two) times a day As needed), Disp: 45 g, Rfl: 3    Echinacea 125 MG CAPS, Take by mouth, Disp: , Rfl:     fluorouracil (EFUDEX) 5 % cream, Apply at bedtime, cover with bandaid.  In the morning remove bandaid and file each wart with a dedicated nail file or pumice stone., Disp: 15 g, Rfl: 2    glucosamine-chondroitin 500-400 MG tablet, Take 2 tablets by mouth , Disp: , Rfl:     hydroCHLOROthiazide 25 mg tablet, TAKE 1 TABLET (25 MG TOTAL) BY MOUTH DAILY., Disp: 90 tablet, Rfl: 1    ketoconazole (NIZORAL) 2 % cream, Apply topically daily (Patient taking differently: Apply topically daily As needed), Disp: 30 g, Rfl: 3    ketorolac (ACULAR) 0.5 % ophthalmic solution, , Disp: , Rfl:     LORazepam (Ativan) 0.5 mg tablet, Take 1 tablet (0.5 mg total) by mouth every 8 (eight) hours as needed for anxiety (Patient taking differently: Take 0.5 mg by mouth every 8 (eight) hours as needed for anxiety As needed), Disp: 60 tablet, Rfl: 1    metoprolol succinate (TOPROL-XL) 25 mg 24 hr tablet, TAKE 1 TABLET (25 MG TOTAL) BY MOUTH DAILY., Disp: 90 tablet, Rfl: 1    Multiple Vitamins-Minerals (MULTIVITAMIN ADULT EXTRA C PO), Take 1 tablet by mouth, Disp: , Rfl:     prednisoLONE acetate (PRED FORTE) 1 % ophthalmic suspension, , Disp: , Rfl:     valsartan (DIOVAN) 160 mg tablet, TAKE 1 TABLET BY MOUTH EVERY DAY, Disp: 90 tablet, Rfl: 1          Whom besides the patient is providing clinical information about today's encounter?   NO ADDITIONAL HISTORIAN (patient alone provided history)    Physical Exam and Assessment/Plan by Diagnosis:      FOLLOW UP VERUCCA VULGARIS (\"COMMON WART\")    Physical Exam:  Anatomic " Location: Left heel  Morphologic Description:  verrucous papule  Pertinent Positives:  Pertinent Negatives:    Additional History of Present Condition:  Treated with cryotherapy and candida at last visit 5/7/24. Topical was ordered. Which patient states is set to be delivered this week. Patient reports verucca.    Plan:  Discussed this condition - while contagious - is very common and nearly harmless.  It is caused by an infection with human papillomavirus (HPV).  It is most common in school-aged children; however, warts may occur at any age.  They are also seen in greater frequency in the setting of other dermatitis (due to a defective skin barrier) and immunosuppression (e.g., from medications or HIV infection).  Warts are spread by direct skin-to-skin contact or auto-inoculation if a wart is scratched or picked.  The incubation period may be 12+ months depending on the amount of virus inoculated.  Treatments usually make the wart smaller and less uncomfortable and many times leads to total resolution. In children - even without treatment - most warts (up to 90%) may resolve within 2 years.  Warts may be more persistent in adults.  RONNA ANTIGEN IMMUNOTHERAPY.  The presumed mechanism of action is the induction of a systemic T-cell mediated response; cytokines released from Th1 cells such as interleukin-2 and interferon-gamma are increased in response to the injected antigen. In one large study of 220 children (age 3-18 years) with recalcitrant or multiple warts, about 70% had complete resolution with an average of 2.73 treatments.  Adverse effects may include itching, pain (immediately and up to 24 hours following injections), local reactions (burning, blistering, peeling), redness, and swelling.  Rarely, febrile reactions, muscle aches, redness, swelling at the injection site (and subsequent compartment syndrome) more serious immune reactions may occur.  It is suggested that the patient remain for a brief  "period of observation following administration of Candida antigen.  SEE PROCEDURE NOTE BELOW.  CRYOTHERAPY.  Patient/family opts to treat the warts with liquid nitrogen.  Usually this is done at 2- to 4-week intervals.  It destroys the outer layer of skin and causes peeling.  It is uncomfortable and may result in blistering for several days or longer.  It may also result in skin color changes (lightening or darkening of the skin) and even numbness if performed over a superficial nerve such as the side of a finger.  It may also result in permanent nail injury/dystrophy if the nail matrix is damaged during freezing.  Success rates are around 70% after 3 to 4 months of regular freezing sessions.  SEE PROCEDURE NOTE BELOW.    Pared today with currette  Froze with liquid nitrogen  Candida injection    In about a week, start the compounded paste from [facility] (fluorouracil 5%, salicylic acid 70%). Apply at bedtime, cover with bandaid.  In the morning remove bandaid and file each wart with a dedicated nail file or pumice stone.  Do not use the same file or stone on any other family member or other unaffected location    [facility]     We use this service to prescribe medications that are often not covered by insurance.    Your physician will send your prescription to the pharmacy.    You will receive an email from [email or web address] where you will follow the instructions within the email to provide your billing and shipping information.    Your medicine will be shipped directly to you.    If you have any questions, you can call [phone] or email [email or web address]       PROCEDURES PERFORMED TODAY ASSOCIATED WITH THIS CONDITION:          PROCEDURE:  INTRALESIONAL RONNA ANTIGEN    Purpose: Candida antigen is used \"off label\" as an immunotherapy agent in the treatment of warts. This is widely used technique endorsed by many pediatric dermatologists because of its utility in treating multiple " "lesions with minimal pain and discomfort and resulting sequelae to the treated areas.    Indications: It is used \"off label\" for the treatment of warts.     Potential Side Effects: The patient signifies understanding that Candida antigen injection can potentially cause early and/or delayed adverse effects such as:    Pain    Local immune response    Bleeding    Skin discoloration   Swelling    Flu-like illness with increased lymphnodes   Serious allergic reaction (anaphylaxis)    After verbal and written consent were obtained, the to-be-treated area was wiped and cleaned with rubbing alcohol 70%.          Then, a total of 0.2 mL of refrigerated Candida antigen (Lot# ; Expiration 05/17/2025, NDC#: [id number]) was injected intralesionally into a total of 1 lesion/s on the following anatomic areas:  left heel using a 1-mL tuberculin syringe and a 30-gauge needle.      There was less than 1 mL of blood loss and little to no discomfort.  The area was bandaged with a Band-aid.  The patient tolerated the procedure well and remained in the office for observation.  With no signs of an adverse reaction, the patient was eventually discharged from clinic.      PROCEDURE:  DESTRUCTION OF BENIGN LESIONS WITH CRYOTHERAPY  After a thorough discussion of treatment options and risk/benefits/alternatives (including but not limited to local pain, scarring, dyspigmentation, blistering, and possible superinfection), verbal and written consent were obtained and the aforementioned lesions were treated on with cryotherapy using liquid nitrogen x 1 cycle for 5-10 seconds.    TOTAL NUMBER of 1 lesions were treated today on the ANATOMIC LOCATION: Left heel.    The patient tolerated the procedure well, and after-care instructions were provided. Hever was also pared down with curette today.                Scribe Attestation      I,:  Zuleima Alvarado am acting as a scribe while in the presence of the attending physician.:       I,:  Shira " MD Candelario personally performed the services described in this documentation    as scribed in my presence.:

## 2024-07-23 NOTE — PATIENT INSTRUCTIONS
"FOLLOW UP VERUCCA VULGARIS (\"COMMON WART\")    Physical Exam:  Anatomic Location: Left heel  Morphologic Description:  verrucous papule  Pertinent Positives:  Pertinent Negatives:    Additional History of Present Condition:  Treated with cryotherapy and candida at last visit 5/7/24. Topical was ordered. Which patient states is set to be delivered this week. Patient reports verucca.    Plan:  Discussed this condition - while contagious - is very common and nearly harmless.  It is caused by an infection with human papillomavirus (HPV).  It is most common in school-aged children; however, warts may occur at any age.  They are also seen in greater frequency in the setting of other dermatitis (due to a defective skin barrier) and immunosuppression (e.g., from medications or HIV infection).  Warts are spread by direct skin-to-skin contact or auto-inoculation if a wart is scratched or picked.  The incubation period may be 12+ months depending on the amount of virus inoculated.  Treatments usually make the wart smaller and less uncomfortable and many times leads to total resolution. In children - even without treatment - most warts (up to 90%) may resolve within 2 years.  Warts may be more persistent in adults.  RONNA ANTIGEN IMMUNOTHERAPY.  The presumed mechanism of action is the induction of a systemic T-cell mediated response; cytokines released from Th1 cells such as interleukin-2 and interferon-gamma are increased in response to the injected antigen. In one large study of 220 children (age 3-18 years) with recalcitrant or multiple warts, about 70% had complete resolution with an average of 2.73 treatments.  Adverse effects may include itching, pain (immediately and up to 24 hours following injections), local reactions (burning, blistering, peeling), redness, and swelling.  Rarely, febrile reactions, muscle aches, redness, swelling at the injection site (and subsequent compartment syndrome) more serious immune " "reactions may occur.  It is suggested that the patient remain for a brief period of observation following administration of Candida antigen.  SEE PROCEDURE NOTE BELOW.  CRYOTHERAPY.  Patient/family opts to treat the warts with liquid nitrogen.  Usually this is done at 2- to 4-week intervals.  It destroys the outer layer of skin and causes peeling.  It is uncomfortable and may result in blistering for several days or longer.  It may also result in skin color changes (lightening or darkening of the skin) and even numbness if performed over a superficial nerve such as the side of a finger.  It may also result in permanent nail injury/dystrophy if the nail matrix is damaged during freezing.  Success rates are around 70% after 3 to 4 months of regular freezing sessions.  SEE PROCEDURE NOTE BELOW.       PROCEDURES PERFORMED TODAY ASSOCIATED WITH THIS CONDITION:          PROCEDURE:  INTRALESIONAL RONNA ANTIGEN    Purpose: Candida antigen is used \"off label\" as an immunotherapy agent in the treatment of warts. This is widely used technique endorsed by many pediatric dermatologists because of its utility in treating multiple lesions with minimal pain and discomfort and resulting sequelae to the treated areas.    Indications: It is used \"off label\" for the treatment of warts.     Potential Side Effects: The patient signifies understanding that Candida antigen injection can potentially cause early and/or delayed adverse effects such as:    Pain    Local immune response    Bleeding    Skin discoloration   Swelling    Flu-like illness with increased lymphnodes   Serious allergic reaction (anaphylaxis)    After verbal and written consent were obtained, the to-be-treated area was wiped and cleaned with rubbing alcohol 70%.          Then, a total of 0.2 mL of refrigerated Candida antigen (Lot# ; Expiration 05/17/2025, NDC#: [id number]) was injected intralesionally into a total of 1 lesion/s on the following anatomic areas:  " left heel using a 1-mL tuberculin syringe and a 30-gauge needle.      There was less than 1 mL of blood loss and little to no discomfort.  The area was bandaged with a Band-aid.  The patient tolerated the procedure well and remained in the office for observation.  With no signs of an adverse reaction, the patient was eventually discharged from clinic.      PROCEDURE:  DESTRUCTION OF BENIGN LESIONS WITH CRYOTHERAPY  After a thorough discussion of treatment options and risk/benefits/alternatives (including but not limited to local pain, scarring, dyspigmentation, blistering, and possible superinfection), verbal and written consent were obtained and the aforementioned lesions were treated on with cryotherapy using liquid nitrogen x 1 cycle for 5-10 seconds.    TOTAL NUMBER of 1 lesions were treated today on the ANATOMIC LOCATION: Left heel.    The patient tolerated the procedure well, and after-care instructions were provided. Hever was also pared down with curette today.

## 2024-09-23 DIAGNOSIS — E78.2 MIXED HYPERLIPIDEMIA: ICD-10-CM

## 2024-09-23 DIAGNOSIS — I10 ESSENTIAL HYPERTENSION: ICD-10-CM

## 2024-09-24 RX ORDER — ATORVASTATIN CALCIUM 10 MG/1
TABLET, FILM COATED ORAL
Qty: 90 TABLET | Refills: 1 | Status: SHIPPED | OUTPATIENT
Start: 2024-09-24

## 2024-09-24 RX ORDER — VALSARTAN 160 MG/1
TABLET ORAL
Qty: 90 TABLET | Refills: 1 | Status: SHIPPED | OUTPATIENT
Start: 2024-09-24

## 2024-09-24 RX ORDER — HYDROCHLOROTHIAZIDE 25 MG/1
25 TABLET ORAL DAILY
Qty: 90 TABLET | Refills: 1 | Status: SHIPPED | OUTPATIENT
Start: 2024-09-24

## 2024-10-01 DIAGNOSIS — R00.2 PALPITATIONS: ICD-10-CM

## 2024-10-01 DIAGNOSIS — R94.31 ABNORMAL EKG: ICD-10-CM

## 2024-10-01 RX ORDER — METOPROLOL SUCCINATE 25 MG/1
25 TABLET, EXTENDED RELEASE ORAL DAILY
Qty: 90 TABLET | Refills: 1 | Status: SHIPPED | OUTPATIENT
Start: 2024-10-01

## 2024-10-10 ENCOUNTER — OFFICE VISIT (OUTPATIENT)
Dept: DERMATOLOGY | Facility: CLINIC | Age: 69
End: 2024-10-10
Payer: COMMERCIAL

## 2024-10-10 VITALS — BODY MASS INDEX: 26.06 KG/M2 | WEIGHT: 138 LBS | HEIGHT: 61 IN | TEMPERATURE: 97 F

## 2024-10-10 DIAGNOSIS — Z09 FOLLOW-UP EXAM AFTER TREATMENT: Primary | ICD-10-CM

## 2024-10-10 PROCEDURE — 99212 OFFICE O/P EST SF 10 MIN: CPT | Performed by: NURSE PRACTITIONER

## 2024-10-10 NOTE — PROGRESS NOTES
"[facility] Clinic Note     Patient Name: Pedro Avila  Encounter Date: 10/10/24     Have you been cared for by a [facility] Dermatologist in the last 3 years and, if so, which description applies to you?    Yes.  I have been here within the last 3 years, and my medical history has NOT changed since that time.  I am FEMALE/of child-bearing potential.    REVIEW OF SYSTEMS:  Have you recently had or currently have any of the following? No changes in my recent health.   PAST MEDICAL HISTORY:  Have you personally ever had or currently have any of the following?  If \"YES,\" then please provide more detail. No changes in my medical history.   HISTORY OF IMMUNOSUPPRESSION: Do you have a history of any of the following:  Systemic Immunosuppression such as Diabetes, Biologic or Immunotherapy, Chemotherapy, Organ Transplantation, Bone Marrow Transplantation or Prednisone?  No     Answering \"YES\" requires the addition of the dotphrase \"IMMUNOSUPPRESSED\" as the first diagnosis of the patient's visit.   FAMILY HISTORY:  Any \"first degree relatives\" (parent, brother, sister, or child) with the following?    No changes in my family's known health.   PATIENT EXPERIENCE:    Do you want the Dermatologist to perform a COMPLETE skin exam today including a clinical examination under the \"bra and underwear\" areas?  NO  If necessary, do we have your permission to call and leave a detailed message on your Preferred Phone number that includes your specific medical information?  Yes      Allergies   Allergen Reactions    Nitrofurantoin Hives    Shrimp Extract Allergy Skin Test - Food Allergy Hives     Scratchy throat     Pneumococcal Vaccine Other (See Comments)    Shellfish Allergy - Food Allergy Hives    Sulfa Antibiotics Hives, Itching and Lip Swelling      Current Outpatient Medications:     ascorbic acid (VITAMIN C) 500 MG tablet, Take 500 mg by mouth (Patient not taking: Reported on 1/15/2024), Disp: , Rfl:     atorvastatin " "(LIPITOR) 10 mg tablet, TAKE 1 TABLET BY MOUTH EVERY DAY, Disp: 90 tablet, Rfl: 1    cholecalciferol (VITAMIN D3) 400 units tablet, Take 400 Units by mouth (Patient not taking: Reported on 5/7/2024), Disp: , Rfl:     clobetasol (TEMOVATE) 0.05 % ointment, Apply topically 2 (two) times a day (Patient taking differently: Apply topically 2 (two) times a day As needed), Disp: 45 g, Rfl: 3    Echinacea 125 MG CAPS, Take by mouth, Disp: , Rfl:     fluorouracil (EFUDEX) 5 % cream, Apply at bedtime, cover with bandaid.  In the morning remove bandaid and file each wart with a dedicated nail file or pumice stone., Disp: 15 g, Rfl: 2    glucosamine-chondroitin 500-400 MG tablet, Take 2 tablets by mouth , Disp: , Rfl:     hydroCHLOROthiazide 25 mg tablet, TAKE 1 TABLET (25 MG TOTAL) BY MOUTH DAILY., Disp: 90 tablet, Rfl: 1    ketoconazole (NIZORAL) 2 % cream, Apply topically daily (Patient taking differently: Apply topically daily As needed), Disp: 30 g, Rfl: 3    ketorolac (ACULAR) 0.5 % ophthalmic solution, , Disp: , Rfl:     LORazepam (Ativan) 0.5 mg tablet, Take 1 tablet (0.5 mg total) by mouth every 8 (eight) hours as needed for anxiety (Patient taking differently: Take 0.5 mg by mouth every 8 (eight) hours as needed for anxiety As needed), Disp: 60 tablet, Rfl: 1    metoprolol succinate (TOPROL-XL) 25 mg 24 hr tablet, TAKE 1 TABLET (25 MG TOTAL) BY MOUTH DAILY., Disp: 90 tablet, Rfl: 1    Multiple Vitamins-Minerals (MULTIVITAMIN ADULT EXTRA C PO), Take 1 tablet by mouth, Disp: , Rfl:     prednisoLONE acetate (PRED FORTE) 1 % ophthalmic suspension, , Disp: , Rfl:     valsartan (DIOVAN) 160 mg tablet, TAKE 1 TABLET BY MOUTH EVERY DAY, Disp: 90 tablet, Rfl: 1          Whom besides the patient is providing clinical information about today's encounter?   NO ADDITIONAL HISTORIAN (patient alone provided history)    Physical Exam and Assessment/Plan by Diagnosis:    VERUCCA VULGARIS (\"COMMON WART\") f/u     Physical " Exam:  Anatomic Location: Left heel  Morphologic Description:  clear on exam today    Additional History of Present Condition:  Patient was last seen on 7/23/2024 by Dr. Palomino.  She has been treated with cryotherapy and candida x2.  She was also applying Efudex compound cream to site. She reports improvement and likely lesion has resolved.     Plan:  Discussed this condition - while contagious - is very common and nearly harmless.  It is caused by an infection with human papillomavirus (HPV).  It is most common in school-aged children; however, warts may occur at any age.  They are also seen in greater frequency in the setting of other dermatitis (due to a defective skin barrier) and immunosuppression (e.g., from medications or HIV infection).  Warts are spread by direct skin-to-skin contact or auto-inoculation if a wart is scratched or picked.  The incubation period may be 12+ months depending on the amount of virus inoculated.  Treatments usually make the wart smaller and less uncomfortable and many times leads to total resolution. In children - even without treatment - most warts (up to 90%) may resolve within 2 years.  Warts may be more persistent in adults.  NO FURTHER TREATMENT NEEDED AT THIS TIME        PROCEDURES PERFORMED TODAY ASSOCIATED WITH THIS CONDITION:          NO PROCEDURE PERFORMED TODAY FOR THIS CONDITION.     Medical Complexity:    SELF-LIMITED OR MINOR PROBLEM.  Problem runs a definite and prescribed course, is transient in nature, and is not likely to permanently alter health status.     Scribe Attestation      I,:  Cheryl Russo MA am acting as a scribe while in the presence of the attending physician.:       I,:  LAMAR Du personally performed the services described in this documentation    as scribed in my presence.:

## 2024-10-15 ENCOUNTER — APPOINTMENT (OUTPATIENT)
Dept: LAB | Facility: CLINIC | Age: 69
End: 2024-10-15
Payer: COMMERCIAL

## 2024-10-15 DIAGNOSIS — R73.03 PREDIABETES: ICD-10-CM

## 2024-10-15 DIAGNOSIS — E78.2 MIXED HYPERLIPIDEMIA: ICD-10-CM

## 2024-10-15 LAB
ALBUMIN SERPL BCG-MCNC: 4.4 G/DL (ref 3.5–5)
ALP SERPL-CCNC: 54 U/L (ref 34–104)
ALT SERPL W P-5'-P-CCNC: 16 U/L (ref 7–52)
ANION GAP SERPL CALCULATED.3IONS-SCNC: 12 MMOL/L (ref 4–13)
AST SERPL W P-5'-P-CCNC: 21 U/L (ref 13–39)
BILIRUB SERPL-MCNC: 0.5 MG/DL (ref 0.2–1)
BUN SERPL-MCNC: 15 MG/DL (ref 5–25)
CALCIUM SERPL-MCNC: 9.4 MG/DL (ref 8.4–10.2)
CHLORIDE SERPL-SCNC: 100 MMOL/L (ref 96–108)
CHOLEST SERPL-MCNC: 195 MG/DL
CO2 SERPL-SCNC: 28 MMOL/L (ref 21–32)
CREAT SERPL-MCNC: 0.66 MG/DL (ref 0.6–1.3)
EST. AVERAGE GLUCOSE BLD GHB EST-MCNC: 120 MG/DL
GFR SERPL CREATININE-BSD FRML MDRD: 89 ML/MIN/1.73SQ M
GLUCOSE P FAST SERPL-MCNC: 81 MG/DL (ref 65–99)
HBA1C MFR BLD: 5.8 %
HDLC SERPL-MCNC: 56 MG/DL
LDLC SERPL CALC-MCNC: 110 MG/DL (ref 0–100)
NONHDLC SERPL-MCNC: 139 MG/DL
POTASSIUM SERPL-SCNC: 3.9 MMOL/L (ref 3.5–5.3)
PROT SERPL-MCNC: 7.4 G/DL (ref 6.4–8.4)
SODIUM SERPL-SCNC: 140 MMOL/L (ref 135–147)
TRIGL SERPL-MCNC: 143 MG/DL

## 2024-10-15 PROCEDURE — 36415 COLL VENOUS BLD VENIPUNCTURE: CPT

## 2024-10-15 PROCEDURE — 80053 COMPREHEN METABOLIC PANEL: CPT

## 2024-10-15 PROCEDURE — 80061 LIPID PANEL: CPT

## 2024-10-15 PROCEDURE — 83036 HEMOGLOBIN GLYCOSYLATED A1C: CPT

## 2024-10-18 ENCOUNTER — RA CDI HCC (OUTPATIENT)
Dept: OTHER | Facility: HOSPITAL | Age: 69
End: 2024-10-18

## 2024-10-24 ENCOUNTER — OFFICE VISIT (OUTPATIENT)
Dept: FAMILY MEDICINE CLINIC | Facility: CLINIC | Age: 69
End: 2024-10-24

## 2024-10-24 VITALS
HEIGHT: 61 IN | HEART RATE: 68 BPM | SYSTOLIC BLOOD PRESSURE: 118 MMHG | OXYGEN SATURATION: 98 % | WEIGHT: 137 LBS | RESPIRATION RATE: 16 BRPM | DIASTOLIC BLOOD PRESSURE: 68 MMHG | BODY MASS INDEX: 25.86 KG/M2

## 2024-10-24 DIAGNOSIS — Z23 ENCOUNTER FOR IMMUNIZATION: Primary | ICD-10-CM

## 2024-10-24 DIAGNOSIS — R73.03 PREDIABETES: ICD-10-CM

## 2024-10-24 DIAGNOSIS — M81.6 LOCALIZED OSTEOPOROSIS WITHOUT CURRENT PATHOLOGICAL FRACTURE: ICD-10-CM

## 2024-10-24 DIAGNOSIS — R00.2 PALPITATIONS: ICD-10-CM

## 2024-10-24 DIAGNOSIS — Z00.00 MEDICARE ANNUAL WELLNESS VISIT, SUBSEQUENT: ICD-10-CM

## 2024-10-24 DIAGNOSIS — F41.9 ANXIETY: ICD-10-CM

## 2024-10-24 DIAGNOSIS — I10 ESSENTIAL HYPERTENSION: ICD-10-CM

## 2024-10-24 DIAGNOSIS — E78.2 MIXED HYPERLIPIDEMIA: ICD-10-CM

## 2024-10-24 NOTE — PROGRESS NOTES
Ambulatory Visit  Name: Pedro Avila      : 1955      MRN: [id number]  Encounter Provider: Maru West MD  Encounter Date: 10/24/2024   Encounter department: [facility]    Assessment & Plan  Encounter for immunization    Orders:    influenza vaccine, high-dose, PF 0.5 mL (Fluzone High Dose)    Localized osteoporosis without current pathological fracture  Patient took Fosamax for at least 5 years.  Discontinued thereafter.  Last DEXA scan - osteopenia.   Continue with OTC Ca/ Vit D  Orders:    DXA bone density spine hip and pelvis; Future    Essential hypertension  Patient's blood pressure is at goal. Continue valsartan 160, hydrochlorothiazide 25 mg.        Anxiety  On Ativan 0.5 mg prn use only.  Has not used the medication for a long time.  Patient will call back for refill.         Mixed hyperlipidemia  LDL is at goal.  Continue atorvastatin 10 milligram.  Metabolic labs/follow-up at 6 month.         Prediabetes  A1c improved, 5.8 with improved lifestyle.  Continue same.  Continue monitoring with metabolic labs at 6 months.       Palpitations  Evaluated by cardiology.  Improved significantly since patient started metoprolol.       Medicare annual wellness visit, subsequent  [facility]         Depression Screening and Follow-up Plan: Patient was screened for depression during today's encounter. They screened negative with a PHQ-2 score of 0.    Urinary Incontinence Plan of Care: counseling topics discussed: use restroom every 2 hours.       Preventive health issues were discussed with patient, and age appropriate screening tests were ordered as noted in patient's After Visit Summary. Personalized health advice and appropriate referrals for health education or preventive services given if needed, as noted in patient's After Visit Summary.    History of Present Illness     HPI     Patient is here for routine 6-month follow-up   Patient has history of hypertension, dyslipidemia,  prediabetes.  Metabolic labs reviewed with patient.  Noted to have A1c of 5.9.  LDL is at goal.  Renal function stable.    Evaluated by cardiology for palpitations in the setting of increased family stress, leading to a 24-hour Holter that showed high PVC burden-10% of total beats, with short run of nonsustained VT leading to institution of metoprolol-currently at a dose of 25 mg daily.   Anxiety stable now, not on any medications.    Patient Care Team:  Maru West MD as PCP - General (Family Medicine)    Review of Systems   Respiratory: Negative.     Cardiovascular: Negative.  Negative for palpitations.     Medical History Reviewed by provider this encounter:       Annual Wellness Visit Questionnaire   Pedro is here for her Subsequent Wellness visit. Last Medicare Wellness visit information reviewed, patient interviewed, no change since last AWV.     Health Risk Assessment:   Patient rates overall health as very good. Patient feels that their physical health rating is same. Patient is satisfied with their life. Eyesight was rated as same. Hearing was rated as same. Patient feels that their emotional and mental health rating is same. Patients states they are never, rarely angry. Patient states they are sometimes unusually tired/fatigued. Pain experienced in the last 7 days has been none. Patient states that she has experienced no weight loss or gain in last 6 months.     Depression Screening:   PHQ-2 Score: 0      Fall Risk Screening:   In the past year, patient has experienced: no history of falling in past year      Urinary Incontinence Screening:   Patient has leaked urine accidently in the last six months.     Home Safety:  Patient does not have trouble with stairs inside or outside of their home. Patient has working smoke alarms and has working carbon monoxide detector. Home safety hazards include: none.     Nutrition:   Current diet is Low Carb and Limited junk food.     Medications:   Patient is  currently taking over-the-counter supplements. OTC medications include: see medication list. Patient is able to manage medications.     Activities of Daily Living (ADLs)/Instrumental Activities of Daily Living (IADLs):   Walk and transfer into and out of bed and chair?: Yes  Dress and groom yourself?: Yes    Bathe or shower yourself?: Yes    Feed yourself? Yes  Do your laundry/housekeeping?: Yes  Manage your money, pay your bills and track your expenses?: Yes  Make your own meals?: Yes    Do your own shopping?: Yes    Durable Medical Equipment Suppliers  None    Previous Hospitalizations:   Any hospitalizations or ED visits within the last 12 months?: No      Advance Care Planning:   Living will: No    Durable POA for healthcare: No    Advanced directive: No      Cognitive Screening:   Provider or family/friend/caregiver concerned regarding cognition?: No    PREVENTIVE SCREENINGS      Cardiovascular Screening:    General: Screening Not Indicated and History Lipid Disorder      Diabetes Screening:     General: Screening Current      Colorectal Cancer Screening:     General: Screening Current      Breast Cancer Screening:     General: Screening Current      Cervical Cancer Screening:    General: Screening Not Indicated      Osteoporosis Screening:    General: Screening Not Indicated and History Osteoporosis      Abdominal Aortic Aneurysm (AAA) Screening:        General: Screening Not Indicated      Lung Cancer Screening:     General: Screening Not Indicated      Hepatitis C Screening:    General: Screening Not Indicated    Screening, Brief Intervention, and Referral to Treatment (SBIRT)    Screening  Typical number of drinks in a day: 0  Typical number of drinks in a week: 0  Interpretation: Low risk drinking behavior.    AUDIT-C Screenin) How often did you have a drink containing alcohol in the past year? monthly or less  2) How many drinks did you have on a typical day when you were drinking in the past year? 1  to 2  3) How often did you have 6 or more drinks on one occasion in the past year? never    AUDIT-C Score: 1  Interpretation: Score 0-2 (female): Negative screen for alcohol misuse    Single Item Drug Screening:  How often have you used an illegal drug (including marijuana) or a prescription medication for non-medical reasons in the past year? never    Single Item Drug Screen Score: 0  Interpretation: Negative screen for possible drug use disorder    Other Counseling Topics:   Car/seat belt/driving safety, skin self-exam, sunscreen and regular weightbearing exercise and calcium and vitamin D intake.     Social Determinants of Health     Financial Resource Strain: Low Risk  (10/16/2023)    Overall Financial Resource Strain (CARDIA)     Difficulty of Paying Living Expenses: Not hard at all   Food Insecurity: No Food Insecurity (10/22/2024)    Hunger Vital Sign     Worried About Running Out of Food in the Last Year: Never true     Ran Out of Food in the Last Year: Never true   Transportation Needs: No Transportation Needs (10/22/2024)    PRAPARE - Transportation     Lack of Transportation (Medical): No     Lack of Transportation (Non-Medical): No   Housing Stability: Low Risk  (10/22/2024)    Housing Stability Vital Sign     Unable to Pay for Housing in the Last Year: No     Number of Times Moved in the Last Year: 0     Homeless in the Last Year: No   Utilities: Not At Risk (10/22/2024)    [facility] Utilities     Threatened with loss of utilities: No     No results found.    Objective     There were no vitals taken for this visit.    Physical Exam  Constitutional:       Appearance: Normal appearance.   Cardiovascular:      Heart sounds: Normal heart sounds.   Pulmonary:      Breath sounds: Normal breath sounds.   Abdominal:      Palpations: Abdomen is soft.   Musculoskeletal:      Right lower leg: No edema.      Left lower leg: No edema.   Psychiatric:         Mood and Affect: Mood normal.

## 2024-10-24 NOTE — PATIENT INSTRUCTIONS
Medicare Preventive Visit Patient Instructions  Thank you for completing your Welcome to Medicare Visit or Medicare Annual Wellness Visit today. Your next wellness visit will be due in one year (10/25/2025).  The screening/preventive services that you may require over the next 5-10 years are detailed below. Some tests may not apply to you based off risk factors and/or age. Screening tests ordered at today's visit but not completed yet may show as past due. Also, please note that scanned in results may not display below.  Preventive Screenings:  Service Recommendations Previous Testing/Comments   Colorectal Cancer Screening  * Colonoscopy    * Fecal Occult Blood Test (FOBT)/Fecal Immunochemical Test (FIT)  * Fecal DNA/Cologuard Test  * Flexible Sigmoidoscopy Age: 45-75 years old   Colonoscopy: every 10 years (may be performed more frequently if at higher risk)  OR  FOBT/FIT: every 1 year  OR  Cologuard: every 3 years  OR  Sigmoidoscopy: every 5 years  Screening may be recommended earlier than age 45 if at higher risk for colorectal cancer. Also, an individualized decision between you and your healthcare provider will decide whether screening between the ages of 76-85 would be appropriate. Colonoscopy: 04/08/2024  FOBT/FIT: Not on file  Cologuard: Not on file  Sigmoidoscopy: Not on file    Screening Current     Breast Cancer Screening Age: 40+ years old  Frequency: every 1-2 years  Not required if history of left and right mastectomy Mammogram: 12/21/2023    Screening Current   Cervical Cancer Screening Between the ages of 21-29, pap smear recommended once every 3 years.   Between the ages of 30-65, can perform pap smear with HPV co-testing every 5 years.   Recommendations may differ for women with a history of total hysterectomy, cervical cancer, or abnormal pap smears in past. Pap Smear: Not on file    Screening Not Indicated   Hepatitis C Screening Once for adults born between 1945 and 1965  More frequently in  patients at high risk for Hepatitis C Hep C Antibody: Not on file        Diabetes Screening 1-2 times per year if you're at risk for diabetes or have pre-diabetes Fasting glucose: 81 mg/dL (10/15/2024)  A1C: 5.8 % (10/15/2024)  Screening Current   Cholesterol Screening Once every 5 years if you don't have a lipid disorder. May order more often based on risk factors. Lipid panel: 10/15/2024    Screening Not Indicated  History Lipid Disorder     Other Preventive Screenings Covered by Medicare:  Abdominal Aortic Aneurysm (AAA) Screening: covered once if your at risk. You're considered to be at risk if you have a family history of AAA.  Lung Cancer Screening: covers low dose CT scan once per year if you meet all of the following conditions: (1) Age 55-77; (2) No signs or symptoms of lung cancer; (3) Current smoker or have quit smoking within the last 15 years; (4) You have a tobacco smoking history of at least 20 pack years (packs per day multiplied by number of years you smoked); (5) You get a written order from a healthcare provider.  Glaucoma Screening: covered annually if you're considered high risk: (1) You have diabetes OR (2) Family history of glaucoma OR (3)  aged 50 and older OR (4)  American aged 65 and older  Osteoporosis Screening: covered every 2 years if you meet one of the following conditions: (1) You're estrogen deficient and at risk for osteoporosis based off medical history and other findings; (2) Have a vertebral abnormality; (3) On glucocorticoid therapy for more than 3 months; (4) Have primary hyperparathyroidism; (5) On osteoporosis medications and need to assess response to drug therapy.   Last bone density test (DXA Scan): 07/18/2022.  HIV Screening: covered annually if you're between the age of 15-65. Also covered annually if you are younger than 15 and older than 65 with risk factors for HIV infection. For pregnant patients, it is covered up to 3 times per  pregnancy.    Immunizations:  Immunization Recommendations   Influenza Vaccine Annual influenza vaccination during flu season is recommended for all persons aged >= 6 months who do not have contraindications   Pneumococcal Vaccine   * Pneumococcal conjugate vaccine = PCV13 (Prevnar 13), PCV15 (Vaxneuvance), PCV20 (Prevnar 20)  * Pneumococcal polysaccharide vaccine = PPSV23 (Pneumovax) Adults 19-63 yo with certain risk factors or if 65+ yo  If never received any pneumonia vaccine: recommend Prevnar 20 (PCV20)  Give PCV20 if previously received 1 dose of PCV13 or PPSV23   Hepatitis B Vaccine 3 dose series if at intermediate or high risk (ex: diabetes, end stage renal disease, liver disease)   Respiratory syncytial virus (RSV) Vaccine - COVERED BY MEDICARE PART D  * RSVPreF3 (Arexvy) CDC recommends that adults 60 years of age and older may receive a single dose of RSV vaccine using shared clinical decision-making (SCDM)   Tetanus (Td) Vaccine - COST NOT COVERED BY MEDICARE PART B Following completion of primary series, a booster dose should be given every 10 years to maintain immunity against tetanus. Td may also be given as tetanus wound prophylaxis.   Tdap Vaccine - COST NOT COVERED BY MEDICARE PART B Recommended at least once for all adults. For pregnant patients, recommended with each pregnancy.   Shingles Vaccine (Shingrix) - COST NOT COVERED BY MEDICARE PART B  2 shot series recommended in those 19 years and older who have or will have weakened immune systems or those 50 years and older     Health Maintenance Due:      Topic Date Due   • Hepatitis C Screening  Never done   • Breast Cancer Screening: Mammogram  12/21/2024   • Colorectal Cancer Screening  04/07/2029     Immunizations Due:      Topic Date Due   • Influenza Vaccine (1) 09/01/2024     Advance Directives   What are advance directives?  Advance directives are legal documents that state your wishes and plans for medical care. These plans are made ahead  of time in case you lose your ability to make decisions for yourself. Advance directives can apply to any medical decision, such as the treatments you want, and if you want to donate organs.   What are the types of advance directives?  There are many types of advance directives, and each state has rules about how to use them. You may choose a combination of any of the following:  Living will:  This is a written record of the treatment you want. You can also choose which treatments you do not want, which to limit, and which to stop at a certain time. This includes surgery, medicine, IV fluid, and tube feedings.   Durable power of  for healthcare (DPAHC):  This is a written record that states who you want to make healthcare choices for you when you are unable to make them for yourself. This person, called a proxy, is usually a family member or a friend. You may choose more than 1 proxy.  Do not resuscitate (DNR) order:  A DNR order is used in case your heart stops beating or you stop breathing. It is a request not to have certain forms of treatment, such as CPR. A DNR order may be included in other types of advance directives.  Medical directive:  This covers the care that you want if you are in a coma, near death, or unable to make decisions for yourself. You can list the treatments you want for each condition. Treatment may include pain medicine, surgery, blood transfusions, dialysis, IV or tube feedings, and a ventilator (breathing machine).  Values history:  This document has questions about your views, beliefs, and how you feel and think about life. This information can help others choose the care that you would choose.  Why are advance directives important?  An advance directive helps you control your care. Although spoken wishes may be used, it is better to have your wishes written down. Spoken wishes can be misunderstood, or not followed. Treatments may be given even if you do not want them. An advance  directive may make it easier for your family to make difficult choices about your care.   Urinary Incontinence   Urinary incontinence (UI)  is when you lose control of your bladder. UI develops because your bladder cannot store or empty urine properly. The 3 most common types of UI are stress incontinence, urge incontinence, or both.  Medicines:   May be given to help strengthen your bladder control. Report any side effects of medication to your healthcare provider.  Do pelvic muscle exercises often:  Your pelvic muscles help you stop urinating. Squeeze these muscles tight for 5 seconds, then relax for 5 seconds. Gradually work up to squeezing for 10 seconds. Do 3 sets of 15 repetitions a day, or as directed. This will help strengthen your pelvic muscles and improve bladder control.  Train your bladder:  Go to the bathroom at set times, such as every 2 hours, even if you do not feel the urge to go. You can also try to hold your urine when you feel the urge to go. For example, hold your urine for 5 minutes when you feel the urge to go. As that becomes easier, hold your urine for 10 minutes.   Self-care:   Keep a UI record.  Write down how often you leak urine and how much you leak. Make a note of what you were doing when you leaked urine.  Drink liquids as directed. You may need to limit the amount of liquid you drink to help control your urine leakage. Do not drink any liquid right before you go to bed. Limit or do not have drinks that contain caffeine or alcohol.   Prevent constipation.  Eat a variety of high-fiber foods. Good examples are high-fiber cereals, beans, vegetables, and whole-grain breads. Walking is the best way to trigger your intestines to have a bowel movement.  Exercise regularly and maintain a healthy weight.  Weight loss and exercise will decrease pressure on your bladder and help you control your leakage.   Use a catheter as directed  to help empty your bladder. A catheter is a tiny, plastic  tube that is put into your bladder to drain your urine.   Go to behavior therapy as directed.  Behavior therapy may be used to help you learn to control your urge to urinate.    Weight Management   Why it is important to manage your weight:  Being overweight increases your risk of health conditions such as heart disease, high blood pressure, type 2 diabetes, and certain types of cancer. It can also increase your risk for osteoarthritis, sleep apnea, and other respiratory problems. Aim for a slow, steady weight loss. Even a small amount of weight loss can lower your risk of health problems.  How to lose weight safely:  A safe and healthy way to lose weight is to eat fewer calories and get regular exercise. You can lose up about 1 pound a week by decreasing the number of calories you eat by 500 calories each day.   Healthy meal plan for weight management:  A healthy meal plan includes a variety of foods, contains fewer calories, and helps you stay healthy. A healthy meal plan includes the following:  Eat whole-grain foods more often.  A healthy meal plan should contain fiber. Fiber is the part of grains, fruits, and vegetables that is not broken down by your body. Whole-grain foods are healthy and provide extra fiber in your diet. Some examples of whole-grain foods are whole-wheat breads and pastas, oatmeal, brown rice, and bulgur.  Eat a variety of vegetables every day.  Include dark, leafy greens such as spinach, kale, lucia greens, and mustard greens. Eat yellow and orange vegetables such as carrots, sweet potatoes, and winter squash.   Eat a variety of fruits every day.  Choose fresh or canned fruit (canned in its own juice or light syrup) instead of juice. Fruit juice has very little or no fiber.  Eat low-fat dairy foods.  Drink fat-free (skim) milk or 1% milk. Eat fat-free yogurt and low-fat cottage cheese. Try low-fat cheeses such as mozzarella and other reduced-fat cheeses.  Choose meat and other protein  foods that are low in fat.  Choose beans or other legumes such as split peas or lentils. Choose fish, skinless poultry (chicken or turkey), or lean cuts of red meat (beef or pork). Before you cook meat or poultry, cut off any visible fat.   Use less fat and oil.  Try baking foods instead of frying them. Add less fat, such as margarine, sour cream, regular salad dressing and mayonnaise to foods. Eat fewer high-fat foods. Some examples of high-fat foods include french fries, doughnuts, ice cream, and cakes.  Eat fewer sweets.  Limit foods and drinks that are high in sugar. This includes candy, cookies, regular soda, and sweetened drinks.  Exercise:  Exercise at least 30 minutes per day on most days of the week. Some examples of exercise include walking, biking, dancing, and swimming. You can also fit in more physical activity by taking the stairs instead of the elevator or parking farther away from stores. Ask your healthcare provider about the best exercise plan for you.      © Copyright [employer] 2018 Information is for End User's use only and may not be sold, redistributed or otherwise used for commercial purposes. All illustrations and images included in CareNotes® are the copyrighted property of [employer] or [employer]

## 2024-10-24 NOTE — ASSESSMENT & PLAN NOTE
On Ativan 0.5 mg prn use only.  Has not used the medication for a long time.  Patient will call back for refill.

## 2024-10-24 NOTE — ASSESSMENT & PLAN NOTE
Patient took Fosamax for at least 5 years.  Discontinued thereafter.  Last DEXA scan 2022- osteopenia.   Continue with OTC Ca/ Vit D  Orders:    DXA bone density spine hip and pelvis; Future

## 2024-10-24 NOTE — ASSESSMENT & PLAN NOTE
A1c improved, 5.8 with improved lifestyle.  Continue same.  Continue monitoring with metabolic labs at 6 months.

## 2024-11-23 PROBLEM — Z00.00 MEDICARE ANNUAL WELLNESS VISIT, SUBSEQUENT: Status: RESOLVED | Noted: 2024-10-24 | Resolved: 2024-11-23

## 2025-01-06 ENCOUNTER — HOSPITAL ENCOUNTER (OUTPATIENT)
Dept: RADIOLOGY | Age: 70
Discharge: HOME | End: 2025-01-06
Payer: COMMERCIAL

## 2025-01-06 DIAGNOSIS — Z12.31 VISIT FOR SCREENING MAMMOGRAM: ICD-10-CM

## 2025-01-06 PROCEDURE — 77067 SCR MAMMO BI INCL CAD: CPT

## 2025-01-06 PROCEDURE — 77063 BREAST TOMOSYNTHESIS BI: CPT

## 2025-01-07 ENCOUNTER — RESULTS FOLLOW-UP (OUTPATIENT)
Dept: FAMILY MEDICINE CLINIC | Facility: CLINIC | Age: 70
End: 2025-01-07

## 2025-01-24 ENCOUNTER — OFFICE VISIT (OUTPATIENT)
Dept: CARDIOLOGY CLINIC | Facility: MEDICAL CENTER | Age: 70
End: 2025-01-24
Payer: COMMERCIAL

## 2025-01-24 VITALS
OXYGEN SATURATION: 98 % | SYSTOLIC BLOOD PRESSURE: 126 MMHG | HEIGHT: 61 IN | DIASTOLIC BLOOD PRESSURE: 82 MMHG | BODY MASS INDEX: 26.47 KG/M2 | HEART RATE: 84 BPM | WEIGHT: 140.2 LBS

## 2025-01-24 DIAGNOSIS — R00.2 PALPITATIONS: ICD-10-CM

## 2025-01-24 DIAGNOSIS — I49.3 PVC (PREMATURE VENTRICULAR CONTRACTION): ICD-10-CM

## 2025-01-24 DIAGNOSIS — I10 ESSENTIAL HYPERTENSION: Primary | ICD-10-CM

## 2025-01-24 DIAGNOSIS — E78.2 MIXED HYPERLIPIDEMIA: ICD-10-CM

## 2025-01-24 PROCEDURE — 99214 OFFICE O/P EST MOD 30 MIN: CPT | Performed by: INTERNAL MEDICINE

## 2025-01-24 NOTE — PROGRESS NOTES
Cardiology Follow up    Pedro Avila  [id number]  1955  [facility] [place]  [address] [facility] [place] [address]      Diagnoses and all orders for this visit:    Essential hypertension    Mixed hyperlipidemia    Palpitations    PVC (premature ventricular contraction)      I had the pleasure of seeing Pedro Avila for a follow up regarding palpitaitons and PVCs requested by     History of the Presenting Illness, Discussion/Summary and my Plan are as follows:::    Cherelle Abdullahi is a pleasant 69-year-old lady with a history of hypertension and dyslipidemia-both treated, no tobacco use, no diabetes, no personal history of cardiac disease, who had sought attention for palpitations in the setting of increased family stress, leading to a 24-hour Holter that showed high PVC burden-10% of total beats, with short run of nonsustained VT leading to institution of metoprolol-currently at a dose of 25 mg daily.    Her family stressors have also decreased and with institution of metoprolol as well, her palpitations completely resolved and follow-up Holter-January 2024 showed decreased burden at 3.5% only.    She continues to regularly exercises-80 minutes, slow jog, stationary bike, and free weights without any limitations or any symptoms.    FH: dad - 'massive KOWALSKI' at 83, mom no heart issues, sibs and kids are well  Activity: 80 min of cardio a day - bike or slow jog,no change in physical capacity. No ICDs or arrhythmias    She underwent an echocardiogram that was unremarkable-November 2023, a treadmill stress test-January 2024 that was false positive ECG, followed by a stress echo that continue to show ECG changes but stress echo itself was normal, with excellent functional capacity and no symptoms-January 2024.    Doing well overall, no palpitations in the last 3 months    Plan:    H/O High PVC burden with run of  nonsustained VT: Continue metoprolol without changes, echo without any structural heart disease,  See above for ischemic evaluation-negative.  Etiology could be from increased stress and catecholamines  Repeat Holter monitoring in January 2024 on metoprolol 25 mg daily show decrease burden at 3.5%.   No changes at this time.     Hypertension: On valsartan, HCTZ and metoprolol, controlled at home, has white coat syndrome     Dyslipidemia: Reasonably controlled although the last 1 was a little higher.  Aware of slow weight gain    Follow-up in 6 months       Latest Reference Range & Units 04/04/24 09:38 10/15/24 09:44   Cholesterol See Comment mg/dL 190 195   Triglycerides See Comment mg/dL 140 143   HDL >=50 mg/dL 64 56   Non-HDL Cholesterol mg/dl 126 139   LDL Calculated 0 - 100 mg/dL 98 110 (H)   (H): Data is abnormally high     Latest Reference Range & Units 04/04/24 09:38 10/15/24 09:44   BUN 5 - 25 mg/dL 18 15   Creatinine 0.60 - 1.30 mg/dL 0.71 0.66      Latest Reference Range & Units 04/04/24 09:38 10/15/24 09:44   Hemoglobin A1C  5.9 (H) 5.8 (H)   eAG, EST AVG Glucose mg/dl 123 120   (H): Data is abnormally high     Latest Reference Range & Units 10/05/22 09:27 03/31/23 09:04 10/12/23 09:28   Cholesterol See Comment mg/dL 179 184 198   Triglycerides See Comment mg/dL 149 150 174 (H)   HDL >=50 mg/dL 51 58 62   Non-HDL Cholesterol mg/dl 128 126 136   LDL Calculated 0 - 100 mg/dL 98 96 101 (H)   (H): Data is abnormally high     Latest Reference Range & Units 10/05/22 09:27 03/31/23 09:04 10/12/23 09:28   Hemoglobin A1C  6.0 (H) 5.8 (H) 5.9 (H)   (H): Data is abnormally high   Latest Reference Range & Units 03/31/23 09:04 10/12/23 09:28   Potassium 3.5 - 5.3 mmol/L 3.8 4.5     1. Essential hypertension        2. Mixed hyperlipidemia        3. Palpitations        4. PVC (premature ventricular contraction)          Patient Active Problem List   Diagnosis    Essential hypertension    Mixed hyperlipidemia    Primary  osteoarthritis involving multiple joints    Eczema    Biceps tendinitis of right shoulder    Need for vaccination    Osteoporosis    Arthritis of right glenohumeral joint    Elevated fasting blood sugar    Prediabetes    Left foot pain    Skin rash    Palpitations    Anxiety    History of colon polyps    PVC (premature ventricular contraction)     Past Medical History:   Diagnosis Date    Allergic 2010    Hive reaction to Macrodantin, 2014 - reaction to shrimp    Arthritis 2012?    take Meloxicam, Glucosamine & Chondroitin    Hypertension 2000?    take hydrochlorothiazide, Valsartan & Atorvastatin    Urinary tract infection 1975    Occasional bladder infections throughout adulthood    Verruca      Social History     Socioeconomic History    Marital status: /Civil Union     Spouse name: Not on file    Number of children: Not on file    Years of education: Not on file    Highest education level: Not on file   Occupational History    Not on file   Tobacco Use    Smoking status: Never    Smokeless tobacco: Never   Vaping Use    Vaping status: Never Used   Substance and Sexual Activity    Alcohol use: Yes     Alcohol/week: 1.0 standard drink of alcohol     Types: 1 Glasses of wine per week     Comment: Very limited. Less than 1 per week.    Drug use: Never    Sexual activity: Not Currently     Partners: Male     Birth control/protection: Post-menopausal, None   Other Topics Concern    Not on file   Social History Narrative    Not on file     Social Drivers of Health     Financial Resource Strain: Low Risk  (10/16/2023)    Overall Financial Resource Strain (CARDIA)     Difficulty of Paying Living Expenses: Not hard at all   Food Insecurity: No Food Insecurity (10/22/2024)    Hunger Vital Sign     Worried About Running Out of Food in the Last Year: Never true     Ran Out of Food in the Last Year: Never true   Transportation Needs: No Transportation Needs (10/22/2024)    PRAPARE - Transportation     Lack of  Transportation (Medical): No     Lack of Transportation (Non-Medical): No   Physical Activity: Not on file   Stress: Not on file   Social Connections: Not on file   Intimate Partner Violence: Not on file   Housing Stability: Low Risk  (10/22/2024)    Housing Stability Vital Sign     Unable to Pay for Housing in the Last Year: No     Number of Times Moved in the Last Year: 0     Homeless in the Last Year: No      Family History   Problem Relation Age of Onset    Depression Mother         Bi-polar    Cancer Mother         Non-small cell lung cancer (non-smoker)    Bipolar disorder Mother     Hypertension Father     Heart disease Father     Hearing loss Father     No Known Problems Daughter     No Known Problems Daughter     Dementia Maternal Grandmother     Heart disease Maternal Grandfather     Diabetes Maternal Grandfather     Hypertension Brother     Dementia Maternal Uncle     Completed Suicide  Maternal Uncle     No Known Problems Paternal Aunt      Past Surgical History:   Procedure Laterality Date     SECTION  1980    EYE SURGERY  2020    cataract surgery R eye    HYSTERECTOMY  2000    OOPHORECTOMY      PAROTIDECTOMY         Current Outpatient Medications:     atorvastatin (LIPITOR) 10 mg tablet, TAKE 1 TABLET BY MOUTH EVERY DAY, Disp: 90 tablet, Rfl: 1    clobetasol (TEMOVATE) 0.05 % ointment, Apply topically 2 (two) times a day (Patient taking differently: Apply topically 2 (two) times a day As needed), Disp: 45 g, Rfl: 3    fluorouracil (EFUDEX) 5 % cream, Apply at bedtime, cover with bandaid.  In the morning remove bandaid and file each wart with a dedicated nail file or pumice stone., Disp: 15 g, Rfl: 2    glucosamine-chondroitin 500-400 MG tablet, Take 2 tablets by mouth , Disp: , Rfl:     hydroCHLOROthiazide 25 mg tablet, TAKE 1 TABLET (25 MG TOTAL) BY MOUTH DAILY., Disp: 90 tablet, Rfl: 1    ketoconazole (NIZORAL) 2 % cream, Apply topically daily, Disp: 30 g, Rfl: 3    LORazepam  "(Ativan) 0.5 mg tablet, Take 1 tablet (0.5 mg total) by mouth every 8 (eight) hours as needed for anxiety (Patient taking differently: Take 0.5 mg by mouth every 8 (eight) hours as needed for anxiety As needed), Disp: 60 tablet, Rfl: 1    metoprolol succinate (TOPROL-XL) 25 mg 24 hr tablet, TAKE 1 TABLET (25 MG TOTAL) BY MOUTH DAILY., Disp: 90 tablet, Rfl: 1    Multiple Vitamins-Minerals (MULTIVITAMIN ADULT EXTRA C PO), Take 1 tablet by mouth, Disp: , Rfl:     valsartan (DIOVAN) 160 mg tablet, TAKE 1 TABLET BY MOUTH EVERY DAY, Disp: 90 tablet, Rfl: 1    ascorbic acid (VITAMIN C) 500 MG tablet, Take 500 mg by mouth (Patient not taking: Reported on 1/15/2024), Disp: , Rfl:     cholecalciferol (VITAMIN D3) 400 units tablet, Take 400 Units by mouth (Patient not taking: Reported on 5/7/2024), Disp: , Rfl:     Echinacea 125 MG CAPS, Take by mouth (Patient not taking: Reported on 10/10/2024), Disp: , Rfl:     ketorolac (ACULAR) 0.5 % ophthalmic solution, , Disp: , Rfl:     prednisoLONE acetate (PRED FORTE) 1 % ophthalmic suspension, , Disp: , Rfl:   Allergies   Allergen Reactions    Nitrofurantoin Hives    Shrimp Extract Allergy Skin Test - Food Allergy Hives     Scratchy throat     Pneumococcal Vaccine Other (See Comments)    Shellfish Allergy - Food Allergy Hives    Sulfa Antibiotics Hives, Itching and Lip Swelling     Vitals:    01/24/25 0809   BP: 126/82   BP Location: Left arm   Patient Position: Sitting   Cuff Size: Adult   Pulse: 84   SpO2: 98%   Weight: 63.6 kg (140 lb 3.2 oz)   Height: 5' 1\" (1.549 m)         Imaging: Mammo screening bilateral w 3d & cad    Result Date: 12/26/2023  Narrative: DIAGNOSIS: Visit for screening mammogram TECHNIQUE: Digital screening mammography was performed. Computer Aided Detection (CAD) analyzed all applicable images. COMPARISONS: Prior breast imaging dated: 12/20/2022, 09/09/2021, 09/02/2021, 09/07/2019, 09/05/2018, 07/13/2017, 02/17/2016, and 11/25/2014 RELEVANT HISTORY: Family " "Breast Cancer History: No known family history of breast cancer. Family Medical History: No known relevant family medical history. Personal History: Hormone history includes birth control and estrogen replacement therapy. Surgical history includes hysterectomy and oophorectomy. No known relevant medical history. The patient is scheduled in a reminder system for screening mammography. 8-10% of cancers will be missed on mammography. Management of a palpable abnormality must be based on clinical grounds.  Patients will be notified of their results via letter from our facility. Accredited by American College of [employer] and FDA. RISK ASSESSMENT: 5 Year Tyrer-Cuzick: 1.04% 10 Year Tyrer-Cuzick: 2.21% Lifetime Tyrer-Cuzick: 3.51% TISSUE DENSITY: There are scattered areas of fibroglandular density. INDICATION: Pedro Avila is a 68 y.o. female presenting for screening mammography. FINDINGS: Bilateral There are no suspicious masses, grouped microcalcifications or areas of unexplained architectural distortion. The skin and nipple areolar complex are unremarkable.  There are scattered calcifications present.     Impression: No mammographic evidence of malignancy. ASSESSMENT/BI-RADS CATEGORY: Left: 2 - Benign Right: 2 - Benign Overall: 2 - Benign RECOMMENDATION:      - Routine screening mammogram in 1 year for both breasts. Workstation ID: [id number]       Review of Systems:  Review of Systems   Constitutional: Negative.    HENT: Negative.     Eyes: Negative.    Respiratory: Negative.     Cardiovascular:  Negative for palpitations.   Endocrine: Negative.    Musculoskeletal: Negative.        Physical Exam:    /82 (BP Location: Left arm, Patient Position: Sitting, Cuff Size: Adult)   Pulse 84   Ht 5' 1\" (1.549 m)   Wt 63.6 kg (140 lb 3.2 oz)   SpO2 98%   BMI 26.49 kg/m²   Physical Exam  Constitutional:       General: She is not in acute distress.     Appearance: She is not ill-appearing, toxic-appearing or " "diaphoretic.   HENT:      Nose: Nose normal. No congestion or rhinorrhea.      Mouth/Throat:      Mouth: Mucous membranes are moist.   Neck:      Vascular: No carotid bruit.   Cardiovascular:      Rate and Rhythm: Normal rate and regular rhythm.      Heart sounds: No murmur heard.     No friction rub. No gallop.   Pulmonary:      Effort: Pulmonary effort is normal. No respiratory distress.      Breath sounds: No stridor. No wheezing or rhonchi.   Abdominal:      General: Abdomen is flat. Bowel sounds are normal. There is no distension.      Palpations: There is no mass.      Tenderness: There is no abdominal tenderness.      Hernia: No hernia is present.   Musculoskeletal:         General: No swelling, tenderness, deformity or signs of injury. Normal range of motion.      Cervical back: Normal range of motion. No rigidity or tenderness.   Lymphadenopathy:      Cervical: No cervical adenopathy.   Neurological:      Mental Status: She is alert.            This note was completed in part utilizing [employer] direct voice recognition software.   Grammatical errors, random word insertion, spelling mistakes, occasional wrong word or \"sound-alike\" substitutions and incomplete sentences may be an occasional consequence of the system secondary to software limitations, ambient noise and hardware issues. At the time of dictation, efforts were made to edit, clarify and /or correct errors.  Please read the chart carefully and recognize, using context, where substitutions have occurred.  If you have any questions or concerns about the context, text or information contained within the body of this dictation, please contact myself, the provider, for further clarification.  "

## 2025-01-28 ENCOUNTER — HOSPITAL ENCOUNTER (OUTPATIENT)
Dept: RADIOLOGY | Age: 70
Discharge: HOME | End: 2025-01-28
Payer: COMMERCIAL

## 2025-01-28 VITALS — WEIGHT: 146 LBS | BODY MASS INDEX: 29.43 KG/M2 | HEIGHT: 59 IN

## 2025-01-28 DIAGNOSIS — M81.6 LOCALIZED OSTEOPOROSIS WITHOUT CURRENT PATHOLOGICAL FRACTURE: ICD-10-CM

## 2025-01-28 PROCEDURE — 77080 DXA BONE DENSITY AXIAL: CPT

## 2025-01-29 ENCOUNTER — RESULTS FOLLOW-UP (OUTPATIENT)
Dept: FAMILY MEDICINE CLINIC | Facility: CLINIC | Age: 70
End: 2025-01-29

## 2025-03-30 DIAGNOSIS — R00.2 PALPITATIONS: ICD-10-CM

## 2025-03-30 DIAGNOSIS — R94.31 ABNORMAL EKG: ICD-10-CM

## 2025-03-31 ENCOUNTER — TELEPHONE (OUTPATIENT)
Age: 70
End: 2025-03-31

## 2025-03-31 DIAGNOSIS — I10 ESSENTIAL HYPERTENSION: Primary | ICD-10-CM

## 2025-03-31 DIAGNOSIS — E78.2 MIXED HYPERLIPIDEMIA: ICD-10-CM

## 2025-03-31 DIAGNOSIS — R73.03 PREDIABETES: ICD-10-CM

## 2025-03-31 DIAGNOSIS — I10 ESSENTIAL HYPERTENSION: ICD-10-CM

## 2025-03-31 RX ORDER — METOPROLOL SUCCINATE 25 MG/1
25 TABLET, EXTENDED RELEASE ORAL DAILY
Qty: 90 TABLET | Refills: 1 | Status: SHIPPED | OUTPATIENT
Start: 2025-03-31

## 2025-03-31 NOTE — TELEPHONE ENCOUNTER
Patient called in stating she needs refills on her medication sent to [facility] on [address]. Please advise once sent in. Thank you.    Patient was a patient of Dr. West and is scheduled for 4/29 with Dr. Dubon.    Requested Prescriptions     Pending Prescriptions Disp Refills    hydroCHLOROthiazide 25 mg tablet 90 tablet 1     Sig: Take 1 tablet (25 mg total) by mouth daily    valsartan (DIOVAN) 160 mg tablet 90 tablet 1     Sig: Take 1 tablet (160 mg total) by mouth daily    atorvastatin (LIPITOR) 10 mg tablet 90 tablet 1     Sig: Take 1 tablet (10 mg total) by mouth daily

## 2025-03-31 NOTE — TELEPHONE ENCOUNTER
Patient called in and would like to know if Dr. Dubon could place lab orders for her to have done prior to her appointment with him on 4/29. Please advise. Thank you.

## 2025-04-02 RX ORDER — HYDROCHLOROTHIAZIDE 25 MG/1
25 TABLET ORAL DAILY
Qty: 90 TABLET | Refills: 0 | Status: SHIPPED | OUTPATIENT
Start: 2025-04-02

## 2025-04-02 RX ORDER — VALSARTAN 160 MG/1
160 TABLET ORAL DAILY
Qty: 90 TABLET | Refills: 0 | Status: SHIPPED | OUTPATIENT
Start: 2025-04-02

## 2025-04-02 RX ORDER — ATORVASTATIN CALCIUM 10 MG/1
10 TABLET, FILM COATED ORAL DAILY
Qty: 90 TABLET | Refills: 0 | Status: SHIPPED | OUTPATIENT
Start: 2025-04-02

## 2025-04-23 ENCOUNTER — APPOINTMENT (OUTPATIENT)
Dept: LAB | Facility: CLINIC | Age: 70
End: 2025-04-23
Payer: COMMERCIAL

## 2025-04-23 DIAGNOSIS — E78.2 MIXED HYPERLIPIDEMIA: ICD-10-CM

## 2025-04-23 DIAGNOSIS — R73.03 PREDIABETES: ICD-10-CM

## 2025-04-23 DIAGNOSIS — I10 ESSENTIAL HYPERTENSION: ICD-10-CM

## 2025-04-23 LAB
ALBUMIN SERPL BCG-MCNC: 4.3 G/DL (ref 3.5–5)
ALP SERPL-CCNC: 59 U/L (ref 34–104)
ALT SERPL W P-5'-P-CCNC: 16 U/L (ref 7–52)
ANION GAP SERPL CALCULATED.3IONS-SCNC: 11 MMOL/L (ref 4–13)
AST SERPL W P-5'-P-CCNC: 19 U/L (ref 13–39)
BASOPHILS # BLD AUTO: 0.05 THOUSANDS/ÂΜL (ref 0–0.1)
BASOPHILS NFR BLD AUTO: 1 % (ref 0–1)
BILIRUB SERPL-MCNC: 0.56 MG/DL (ref 0.2–1)
BUN SERPL-MCNC: 23 MG/DL (ref 5–25)
CALCIUM SERPL-MCNC: 9.5 MG/DL (ref 8.4–10.2)
CHLORIDE SERPL-SCNC: 98 MMOL/L (ref 96–108)
CHOLEST SERPL-MCNC: 214 MG/DL (ref ?–200)
CO2 SERPL-SCNC: 28 MMOL/L (ref 21–32)
CREAT SERPL-MCNC: 0.69 MG/DL (ref 0.6–1.3)
EOSINOPHIL # BLD AUTO: 0.16 THOUSAND/ÂΜL (ref 0–0.61)
EOSINOPHIL NFR BLD AUTO: 2 % (ref 0–6)
ERYTHROCYTE [DISTWIDTH] IN BLOOD BY AUTOMATED COUNT: 13.1 % (ref 11.6–15.1)
EST. AVERAGE GLUCOSE BLD GHB EST-MCNC: 123 MG/DL
GFR SERPL CREATININE-BSD FRML MDRD: 87 ML/MIN/1.73SQ M
GLUCOSE P FAST SERPL-MCNC: 79 MG/DL (ref 65–99)
HBA1C MFR BLD: 5.9 %
HCT VFR BLD AUTO: 39.6 % (ref 34.8–46.1)
HDLC SERPL-MCNC: 61 MG/DL
HGB BLD-MCNC: 12.8 G/DL (ref 11.5–15.4)
IMM GRANULOCYTES # BLD AUTO: 0.02 THOUSAND/UL (ref 0–0.2)
IMM GRANULOCYTES NFR BLD AUTO: 0 % (ref 0–2)
LDLC SERPL CALC-MCNC: 117 MG/DL (ref 0–100)
LYMPHOCYTES # BLD AUTO: 1.93 THOUSANDS/ÂΜL (ref 0.6–4.47)
LYMPHOCYTES NFR BLD AUTO: 27 % (ref 14–44)
MCH RBC QN AUTO: 30.5 PG (ref 26.8–34.3)
MCHC RBC AUTO-ENTMCNC: 32.3 G/DL (ref 31.4–37.4)
MCV RBC AUTO: 95 FL (ref 82–98)
MONOCYTES # BLD AUTO: 0.54 THOUSAND/ÂΜL (ref 0.17–1.22)
MONOCYTES NFR BLD AUTO: 8 % (ref 4–12)
NEUTROPHILS # BLD AUTO: 4.38 THOUSANDS/ÂΜL (ref 1.85–7.62)
NEUTS SEG NFR BLD AUTO: 62 % (ref 43–75)
NONHDLC SERPL-MCNC: 153 MG/DL
NRBC BLD AUTO-RTO: 0 /100 WBCS
PLATELET # BLD AUTO: 357 THOUSANDS/UL (ref 149–390)
PMV BLD AUTO: 10 FL (ref 8.9–12.7)
POTASSIUM SERPL-SCNC: 3.7 MMOL/L (ref 3.5–5.3)
PROT SERPL-MCNC: 7.4 G/DL (ref 6.4–8.4)
RBC # BLD AUTO: 4.19 MILLION/UL (ref 3.81–5.12)
SODIUM SERPL-SCNC: 137 MMOL/L (ref 135–147)
TRIGL SERPL-MCNC: 179 MG/DL (ref ?–150)
WBC # BLD AUTO: 7.08 THOUSAND/UL (ref 4.31–10.16)

## 2025-04-23 PROCEDURE — 80061 LIPID PANEL: CPT

## 2025-04-23 PROCEDURE — 85025 COMPLETE CBC W/AUTO DIFF WBC: CPT

## 2025-04-23 PROCEDURE — 83036 HEMOGLOBIN GLYCOSYLATED A1C: CPT

## 2025-04-23 PROCEDURE — 36415 COLL VENOUS BLD VENIPUNCTURE: CPT

## 2025-04-23 PROCEDURE — 80053 COMPREHEN METABOLIC PANEL: CPT

## 2025-04-27 ENCOUNTER — RESULTS FOLLOW-UP (OUTPATIENT)
Dept: FAMILY MEDICINE CLINIC | Facility: CLINIC | Age: 70
End: 2025-04-27

## 2025-04-29 ENCOUNTER — OFFICE VISIT (OUTPATIENT)
Dept: FAMILY MEDICINE CLINIC | Facility: CLINIC | Age: 70
End: 2025-04-29
Payer: COMMERCIAL

## 2025-04-29 VITALS
DIASTOLIC BLOOD PRESSURE: 70 MMHG | BODY MASS INDEX: 26.51 KG/M2 | HEIGHT: 61 IN | OXYGEN SATURATION: 100 % | HEART RATE: 97 BPM | WEIGHT: 140.4 LBS | SYSTOLIC BLOOD PRESSURE: 110 MMHG

## 2025-04-29 DIAGNOSIS — M81.6 LOCALIZED OSTEOPOROSIS WITHOUT CURRENT PATHOLOGICAL FRACTURE: ICD-10-CM

## 2025-04-29 DIAGNOSIS — I47.29 NSVT (NONSUSTAINED VENTRICULAR TACHYCARDIA) (HCC): ICD-10-CM

## 2025-04-29 DIAGNOSIS — I10 ESSENTIAL HYPERTENSION: Primary | ICD-10-CM

## 2025-04-29 DIAGNOSIS — I49.3 PVC (PREMATURE VENTRICULAR CONTRACTION): ICD-10-CM

## 2025-04-29 DIAGNOSIS — Z79.899 MEDICATION MANAGEMENT: ICD-10-CM

## 2025-04-29 DIAGNOSIS — R73.03 PREDIABETES: ICD-10-CM

## 2025-04-29 DIAGNOSIS — E78.2 MIXED HYPERLIPIDEMIA: ICD-10-CM

## 2025-04-29 PROBLEM — Z23 NEED FOR VACCINATION: Status: RESOLVED | Noted: 2021-10-19 | Resolved: 2025-04-29

## 2025-04-29 PROBLEM — H25.811 COMBINED FORMS OF AGE-RELATED CATARACT OF RIGHT EYE: Status: ACTIVE | Noted: 2020-01-27

## 2025-04-29 PROCEDURE — 99214 OFFICE O/P EST MOD 30 MIN: CPT | Performed by: FAMILY MEDICINE

## 2025-04-29 PROCEDURE — G2211 COMPLEX E/M VISIT ADD ON: HCPCS | Performed by: FAMILY MEDICINE

## 2025-04-29 RX ORDER — ATORVASTATIN CALCIUM 20 MG/1
20 TABLET, FILM COATED ORAL DAILY
Qty: 90 TABLET | Refills: 1 | Status: SHIPPED | OUTPATIENT
Start: 2025-04-29

## 2025-04-29 NOTE — PROGRESS NOTES
New Patient Outpatient Note    HPI:     Pedro Avila, 70 y.o. female  presents today as a new patient and to establish care.  The patient was previously seen by Dr. West.  Overall the patient has been doing well on current medication regimen.  She recently obtain labs that did show an increase in her cholesterol mildly in total cholesterol, triglycerides, and LDL.  The increases did not surpassed 25 points.  We did discuss her family history especially with dad having a significant history for heart disease despite being diligent on medication.    She also follows up with several specialists including cardiology and dermatology.  The other medications that are on her med list that are specific to the heart/skin are being followed by the respective specialty.    Family Hx  UTD in chart    Past Medical History:   Diagnosis Date    Allergic     Hive reaction to Macrodantin,  - reaction to shrimp    Arthritis ?    take Meloxicam, Glucosamine & Chondroitin    Bunion     Callus     Hypertension ?    take hydrochlorothiazide, Valsartan & Atorvastatin    Urinary tract infection 1975    Occasional bladder infections throughout adulthood    Verruca         Past Surgical History:   Procedure Laterality Date     SECTION  1980    EYE SURGERY  2020    cataract surgery R eye    HYSTERECTOMY  2000    OOPHORECTOMY      PAROTIDECTOMY            Current Outpatient Medications:     atorvastatin (LIPITOR) 20 mg tablet, Take 1 tablet (20 mg total) by mouth daily, Disp: 90 tablet, Rfl: 1    calcium polycarbophil (FIBERCON) 625 mg tablet, Take 625 mg by mouth daily, Disp: , Rfl:     fluorouracil (EFUDEX) 5 % cream, Apply at bedtime, cover with bandaid.  In the morning remove bandaid and file each wart with a dedicated nail file or pumice stone., Disp: 15 g, Rfl: 2    glucosamine-chondroitin 500-400 MG tablet, Take 2 tablets by mouth , Disp: , Rfl:     hydroCHLOROthiazide 25 mg tablet, Take 1 tablet  (25 mg total) by mouth daily, Disp: 90 tablet, Rfl: 0    ketoconazole (NIZORAL) 2 % cream, Apply topically daily, Disp: 30 g, Rfl: 3    LORazepam (Ativan) 0.5 mg tablet, Take 1 tablet (0.5 mg total) by mouth every 8 (eight) hours as needed for anxiety (Patient taking differently: Take 0.5 mg by mouth every 8 (eight) hours as needed for anxiety As needed), Disp: 60 tablet, Rfl: 1    metoprolol succinate (TOPROL-XL) 25 mg 24 hr tablet, TAKE 1 TABLET (25 MG TOTAL) BY MOUTH DAILY., Disp: 90 tablet, Rfl: 1    Multiple Vitamins-Minerals (MULTIVITAMIN ADULT EXTRA C PO), Take 1 tablet by mouth, Disp: , Rfl:     valsartan (DIOVAN) 160 mg tablet, Take 1 tablet (160 mg total) by mouth daily, Disp: 90 tablet, Rfl: 0     SOCIAL:   Rent/Own:  Own  Currently living with:   Stable food: Yes  Safe At Home: Yes  Hobbies:  Armin, exercise, grandchildren   Profession/ employment:  Retired  Restriction to medical procedures: NA    SEXUAL HISTORY:   Preference:  Men  Sexually Active:  Yes  Birth Control:  NA    Psychological History  Psychiatric history: None  History of inpatient:  None  Current Therapy/ Provider:  None  Current Medications:  None    Substance History  Smoking: None  Alcohol Use: Very limited < 1 per week  Substance use:  None      ROS:   Review of Systems   Constitutional:  Negative for chills, fever and unexpected weight change.   HENT:  Positive for hearing loss (reduce, slightly). Negative for congestion, ear discharge, ear pain, postnasal drip, rhinorrhea, sinus pressure, sinus pain and sore throat.    Eyes:  Positive for visual disturbance (glasses).   Respiratory:  Negative for cough, chest tightness and shortness of breath.    Cardiovascular:  Negative for chest pain and palpitations.   Gastrointestinal:  Positive for diarrhea. Negative for abdominal pain, constipation, nausea and vomiting.   Genitourinary:  Positive for urgency. Negative for dysuria and frequency.   Neurological:  Negative for  dizziness, light-headedness and headaches.   Psychiatric/Behavioral:  Negative for self-injury and suicidal ideas.         OBJECTIVE  Vitals:    04/29/25 1049   BP: 110/70   Pulse: 97   SpO2: 100%        Physical Exam  Constitutional:       General: She is not in acute distress.     Appearance: Normal appearance. She is not ill-appearing, toxic-appearing or diaphoretic.   HENT:      Head: Normocephalic and atraumatic.      Right Ear: Tympanic membrane, ear canal and external ear normal. There is no impacted cerumen.      Left Ear: Tympanic membrane, ear canal and external ear normal. There is no impacted cerumen.      Nose: Nose normal. No congestion or rhinorrhea.      Mouth/Throat:      Mouth: Mucous membranes are moist.      Pharynx: Oropharynx is clear. No oropharyngeal exudate or posterior oropharyngeal erythema.   Eyes:      General:         Right eye: No discharge.         Left eye: No discharge.      Extraocular Movements: Extraocular movements intact.      Pupils: Pupils are equal, round, and reactive to light.   Cardiovascular:      Rate and Rhythm: Normal rate and regular rhythm.      Heart sounds: Normal heart sounds. No murmur heard.     No friction rub. No gallop.   Pulmonary:      Effort: Pulmonary effort is normal. No respiratory distress.      Breath sounds: Normal breath sounds. No stridor. No wheezing, rhonchi or rales.   Abdominal:      General: Bowel sounds are normal. There is no distension.      Palpations: Abdomen is soft.      Tenderness: There is no abdominal tenderness.   Musculoskeletal:      Cervical back: No tenderness.   Lymphadenopathy:      Cervical: No cervical adenopathy.   Skin:     General: Skin is warm.      Capillary Refill: Capillary refill takes less than 2 seconds.   Neurological:      Mental Status: She is alert.      Sensory: No sensory deficit (light touch in all 4 extrmeities).      Motor: No weakness (5/5 in all 4 extremities).      Deep Tendon Reflexes: Reflexes normal  (2/4, intact, C5, C6, L4, S1).          ASSESSMENT AND PLAN   Cherelle Abdullahi was seen today for establish care.  Diagnoses and all orders for this visit:    Essential hypertension  Blood pressure within normal limits. Continue with current medication management.    Mixed hyperlipidemia  Medication management  Discussed pros and cons of increasing medication for cholesterol.  Currently on 10mg of atorvastatin.  Will increase to 20 mg daily due to family history and father with heart attack in 80's despite compliance with medications.  Patient to continue to reduce cholesterol in diet where she can.  If we find her cholesterol is dropping to low then we can always back off of the medication.   -     atorvastatin (LIPITOR) 20 mg tablet; Take 1 tablet (20 mg total) by mouth daily  -     Lipid panel; Future  -     Hepatic function panel; Future    Prediabetes  History of prediabetes.  Most recent A1c was 5.9.  Order placed for follow up.  We discussed foods to avoid, to decrease risk of medication for type II diabetes.   -     Hemoglobin A1C; Future    Localized osteoporosis without current pathological fracture  History of osteoporosis. Is not currently on any medication for diagnosis.  Recommended continued vitamin D and calcium supplementation.      NSVT (nonsustained ventricular tachycardia) ([facility])  PVC (premature ventricular contraction)  Follows with Dr. Lee.  Defer further management to cardiology.  Appreciate recommendations from specialist.       DO [facility]  4/29/2025 1:32 PM

## 2025-06-28 DIAGNOSIS — I10 ESSENTIAL HYPERTENSION: ICD-10-CM

## 2025-06-30 RX ORDER — VALSARTAN 160 MG/1
160 TABLET ORAL DAILY
Qty: 90 TABLET | Refills: 1 | Status: SHIPPED | OUTPATIENT
Start: 2025-06-30

## 2025-06-30 RX ORDER — HYDROCHLOROTHIAZIDE 25 MG/1
25 TABLET ORAL DAILY
Qty: 90 TABLET | Refills: 1 | Status: SHIPPED | OUTPATIENT
Start: 2025-06-30